# Patient Record
Sex: FEMALE | Race: BLACK OR AFRICAN AMERICAN | NOT HISPANIC OR LATINO | Employment: PART TIME | URBAN - METROPOLITAN AREA
[De-identification: names, ages, dates, MRNs, and addresses within clinical notes are randomized per-mention and may not be internally consistent; named-entity substitution may affect disease eponyms.]

---

## 2017-05-11 ENCOUNTER — OFFICE VISIT (OUTPATIENT)
Dept: FAMILY MEDICINE | Facility: CLINIC | Age: 47
End: 2017-05-11

## 2017-05-11 VITALS
SYSTOLIC BLOOD PRESSURE: 162 MMHG | TEMPERATURE: 97.8 F | WEIGHT: 146.4 LBS | HEART RATE: 68 BPM | BODY MASS INDEX: 22.98 KG/M2 | HEIGHT: 67 IN | OXYGEN SATURATION: 100 % | DIASTOLIC BLOOD PRESSURE: 82 MMHG

## 2017-05-11 DIAGNOSIS — Z30.013 ENCOUNTER FOR INITIAL PRESCRIPTION OF INJECTABLE CONTRACEPTIVE: ICD-10-CM

## 2017-05-11 DIAGNOSIS — I10 BENIGN ESSENTIAL HYPERTENSION: Primary | ICD-10-CM

## 2017-05-11 LAB — HCG UR QL: NEGATIVE

## 2017-05-11 RX ORDER — MEDROXYPROGESTERONE ACETATE 150 MG/ML
150 INJECTION, SUSPENSION INTRAMUSCULAR
Qty: 1 ML | Refills: 0 | OUTPATIENT
Start: 2017-07-27 | End: 2017-07-31

## 2017-05-11 RX ORDER — MEDROXYPROGESTERONE ACETATE 150 MG/ML
150 INJECTION, SUSPENSION INTRAMUSCULAR
Qty: 1 ML | Refills: 1 | OUTPATIENT
Start: 2017-05-11 | End: 2017-05-11

## 2017-05-11 RX ORDER — AMLODIPINE BESYLATE 10 MG/1
10 TABLET ORAL DAILY
Qty: 30 TABLET | Refills: 3 | Status: SHIPPED | OUTPATIENT
Start: 2017-05-11 | End: 2017-10-17

## 2017-05-11 RX ORDER — MEDROXYPROGESTERONE ACETATE 150 MG/ML
150 INJECTION, SUSPENSION INTRAMUSCULAR
Qty: 1 ML | Refills: 0 | OUTPATIENT
Start: 2017-05-11 | End: 2017-05-11

## 2017-05-11 NOTE — NURSING NOTE
BLOOD PRESSURE: 175/89    DATE OF LAST PAP or ANNUAL EXAM: No results found for: PAP  URINE HCG:negative    The following medication was given:     MEDICATION: Depo Provera 150mg  ROUTE: IM  SITE: RUQ - Gluts  : [employer]  LOT #: [id number]  EXPIRATION:11/2019  NEXT INJECTION DUE: 7/27/17-8/17/17  Provider: Dr. ADWOA Pruitt

## 2017-05-11 NOTE — MR AVS SNAPSHOT
"              After Visit Summary   5/11/2017    Bharath Forbes    MRN: [id number]           Patient Information     Date Of Birth          1970        Visit Information        Provider Department      5/11/2017 2:00 PM Anna De Souza MD [facility]        Today's Diagnoses     Benign essential hypertension    -  1    Encounter for initial prescription of injectable contraceptive           Follow-ups after your visit        Who to contact     Please call your clinic at [phone] to:    Ask questions about your health    Make or cancel appointments    Discuss your medicines    Learn about your test results    Speak to your doctor   If you have compliments or concerns about an experience at your clinic, or if you wish to file a complaint, please contact [facility] Patient Relations at [phone] or email us at [email or web address]         Additional Information About Your Visit        Care EveryWhere ID     This is your Care EveryWhere ID. This could be used by other organizations to access your [facility] medical records  [id number]        Your Vitals Were     Pulse Temperature Height Last Period Pulse Oximetry BMI (Body Mass Index)    68 97.8  F (36.6  C) (Oral) 5' 6.75\" (169.5 cm) 04/18/2017 (Approximate) 100% 23.1 kg/m2       Blood Pressure from Last 3 Encounters:   05/11/17 175/89   03/04/16 (!) 166/93   10/26/15 118/77    Weight from Last 3 Encounters:   05/11/17 146 lb 6.4 oz (66.4 kg)   03/04/16 166 lb 6.4 oz (75.5 kg)   10/26/15 164 lb (74.4 kg)              We Performed the Following     HCG Qualitative Urine (UPT)  ([facility])          Today's Medication Changes          These changes are accurate as of: 5/11/17  2:42 PM.  If you have any questions, ask your nurse or doctor.               Start taking these medicines.        Dose/Directions    amLODIPine 10 MG tablet   Commonly known as:  NORVASC   Used for:  Benign essential hypertension "   Started by:  Anna De Souza MD        Dose:  10 mg   Take 1 tablet (10 mg) by mouth daily   Quantity:  30 tablet   Refills:  3         Stop taking these medicines if you haven't already. Please contact your care team if you have questions.     medroxyPROGESTERone 150 MG/ML injection   Commonly known as:  DEPO-PROVERA   Stopped by:  Anna De Souza MD           metroNIDAZOLE 500 MG tablet   Commonly known as:  FLAGYL   Stopped by:  Anna De Souza MD                Where to get your medicines      These medications were sent to [facility][id number] - [place], [place] - [address]  [address], [place] [address]     Phone:  [phone]     amLODIPine 10 MG tablet                Primary Care Provider Office Phone # Fax #    Miesha Villagomez [phone]       [facility] [address] [place] [address]        Thank you!     Thank you for choosing [facility]  for your care. Our goal is always to provide you with excellent care. Hearing back from our patients is one way we can continue to improve our services. Please take a few minutes to complete the written survey that you may receive in the mail after your visit with us. Thank you!             Your Updated Medication List - Protect others around you: Learn how to safely use, store and throw away your medicines at www.disposemymeds.org.          This list is accurate as of: 5/11/17  2:42 PM.  Always use your most recent med list.                   Brand Name Dispense Instructions for use    amLODIPine 10 MG tablet    NORVASC    30 tablet    Take 1 tablet (10 mg) by mouth daily

## 2017-05-11 NOTE — PROGRESS NOTES
"Routine Clinic Visit    S: Bharath Forbes is a 47 year old female with HTN who presents for the following concerns:    Desires to restart Depo. Was doing this in 2014 or so for several years. Regular menses monthly. Patient's last menstrual period was 04/18/2017 (approximate). Not sexually active in the past 2 weeks. Not currently on any BC. Tried pills. Patch didn't work either. Not interested in LARC. No h/o blood clots or liver disease. 3 kids (28, 24, 14yr).     HTN: gets headaches sometimes. Stopped BP med a year ago (was combo amlodipine-benezepril 10mg-40mg). No leg swelling. 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    ROS: As per HPI, otherwise 8 point ROS is negative.    O: BP on recheck 162/82, initial was 175/89  Blood pressure 175/89, pulse 68, temperature 97.8  F (36.6  C), temperature source Oral, height 5' 6.75\" (169.5 cm), weight 146 lb 6.4 oz (66.4 kg), last menstrual period 04/18/2017, SpO2 100 %.   Body mass index is 23.1 kg/(m^2).    Gen: alert, pleasant, NAD  HEENT: NC/AT, EOMI, PERRL.  MMM, no erythema or lesions in orophraynx. No LAD.   CV: RRR, no murmurs. 2+ peripheral pulses  Lungs: CTAB, normal effort  Abd: soft, ND/NT  Back: no CVA tenderness  Extremities: warm, no edema  Psych: mood neutral, affect appropriate  Neuro: CN II- XII grossly intact, no focal deficits      A/P:  47 year old female with:    Encounter for initial prescription of injectable contraceptive. After discussing risks, benefits, and side effects, pt agrees with plan to re-initiate depo.   5/11/2017  Plan Documentation  Service ordered Depo Provera injection (150mg IM) may be given every 3 months for one year per brendacol.  Plan and order should be renewed one year from 5/11/2017 .  Ordered by Anna De Souza.    - medroxyPROGESTERone (DEPO-PROVERA) 150 MG/ML injection; Inject 1 mL (150 mg) into the muscle every 3 months  - HCG Qualitative Urine (UPT)  Negative today and no "     Benign essential hypertension  BP elevated x 2 today in office. Discussed exercise, low salt diet. Pt agreeable with restarting her BP meds again. We will start with just the calcium channel blocker (given her  descent) and will consider addition of ACEI in the future. Recommended BP checks at pharmacy several times/week until return visit in 2-4 weeks to recheck BP control.   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      Follow up: 2 weeks BP check  Return to care as needed if symptoms worsen or fail to improve.    Anna De Souza MD      Precepted today with: Kenyetta Pruitt MD    --------------------------------------------------------    Labs/Imaging this visit:  Recent Results (from the past 100 hour(s))   HCG Qualitative U[facility] (UPT)  ([facility])    Collection Time: 05/11/17  2:19 PM   Result Value Ref Range    HCG Qual Urine NEGATIVE Negative       Chart Review: Reviewed pertinent past hx, family hx, and social hx today.  Patient Active Problem List   Diagnosis     HTN (hypertension)     Encounter for initial prescription of injectable contraceptive       Past Medical History:   Diagnosis Date     Hypertension          Current Outpatient Prescriptions on File Prior to Visit:  [DISCONTINUED] medroxyPROGESTERone (DEPO-PROVERA) 150 MG/ML injection Inject 1 mL (150 mg) into the muscle every 3 months for 20 days     No current facility-administered medications on file prior to visit.     Allergies   Allergen Reactions     Nka [No Known Allergies]        Social History     Social History Narrative     Social History   Substance Use Topics     Smoking status: Former Smoker     Packs/day: 0.25     Years: 1.00     Types: Cigarettes     Smokeless tobacco: Former User     Quit date: 2/26/2010     Alcohol use 0.0 oz/week     0 Standard drinks  or equivalent per week      Comment: Ocassionally       Family History     Problem (# of Occurrences) Relation (Name,Age of Onset)    Hypertension (1) Mother       Negative family history of: DIABETES, Coronary Artery Disease, Breast Cancer, Cancer - colorectal, Ovarian Cancer, Prostate Cancer, Other Cancer, CEREBROVASCULAR DISEASE, Thyroid Disease, Asthma            -----------------------------------------------------------      Options for treatment and follow-up care were reviewed with the patient. Bharath Forbes engaged in the decision making process and verbalized understanding of the options discussed and agreed with the final plan.

## 2017-05-18 ASSESSMENT — PATIENT HEALTH QUESTIONNAIRE - PHQ9: SUM OF ALL RESPONSES TO PHQ QUESTIONS 1-9: 2

## 2017-05-18 NOTE — PROGRESS NOTES
Preceptor Attestation:  Patient's case reviewed and discussed with Anna De Souza MD.  Patient seen and discussed with the resident.  I agree with assessment and plan of care.  Supervising Physician:  Kenyetta Pruitt MD  [facility]

## 2017-05-31 ENCOUNTER — OFFICE VISIT (OUTPATIENT)
Dept: FAMILY MEDICINE | Facility: CLINIC | Age: 47
End: 2017-05-31

## 2017-05-31 VITALS
OXYGEN SATURATION: 100 % | TEMPERATURE: 97.7 F | RESPIRATION RATE: 16 BRPM | BODY MASS INDEX: 23.76 KG/M2 | WEIGHT: 151.4 LBS | HEIGHT: 67 IN | DIASTOLIC BLOOD PRESSURE: 84 MMHG | HEART RATE: 87 BPM | SYSTOLIC BLOOD PRESSURE: 165 MMHG

## 2017-05-31 DIAGNOSIS — I10 ESSENTIAL HYPERTENSION: Primary | ICD-10-CM

## 2017-05-31 LAB
BUN SERPL-MCNC: 11 MG/DL (ref 5–24)
CALCIUM SERPL-MCNC: 8.8 MG/DL (ref 8.5–10.4)
CHLORIDE SERPLBLD-SCNC: 101 MMOL/L (ref 94–109)
CO2 SERPL-SCNC: 28 MMOL/L (ref 20–32)
CREAT SERPL-MCNC: 0.8 MG/DL (ref 0.6–1.3)
EGFR CALCULATED (BLACK REFERENCE): >90 ML/MIN
EGFR CALCULATED (NON BLACK REFERENCE): 81.4 ML/MIN
GLUCOSE SERPL-MCNC: 88 MG/DL (ref 60–109)
POTASSIUM SERPL-SCNC: 3.7 MMOL/L (ref 3.4–5.3)
SODIUM SERPL-SCNC: 140 MMOL/L (ref 133–144)

## 2017-05-31 RX ORDER — HYDROCHLOROTHIAZIDE 25 MG/1
25 TABLET ORAL DAILY
Qty: 30 TABLET | Refills: 1 | Status: SHIPPED | OUTPATIENT
Start: 2017-05-31 | End: 2017-08-15

## 2017-05-31 NOTE — LETTER
June 2, 2017      hBarath CHAU [place]  [address] [place] [address]        Dear Bharath,    At your recent clinic visit, we checked your kidney function and electrolytes which all looked normal!     Please see below for your test results.    Resulted Orders   Basic Metabolic Panel ([facility]) - Results < 1 hr   Result Value Ref Range    Glucose 88.0 60.0 - 109.0 mg/dL    Urea Nitrogen 11.0 5.0 - 24.0 mg/dL    Creatinine 0.8 0.6 - 1.3 mg/dL    Sodium 140.0 133.0 - 144.0 mmol/L    Potassium 3.7 3.4 - 5.3 mmol/L    Chloride 101.0 94.0 - 109.0 mmol/L    Carbon Dioxide 28.0 20.0 - 32.0 mmol/L    Calcium 8.8 8.5 - 10.4 mg/dL    eGFR Calculated (Non Black Reference) 81.4 >60.0 mL/min    eGFR Calculated (Black Reference) >90 >60.0 mL/min       If you have any questions, please call the clinic to make an appointment.    Sincerely,    Anna De Souza MD

## 2017-05-31 NOTE — PROGRESS NOTES
"Routine Clinic Visit    S: Bharath Forbes is a 47 year old female with HTn who presents for the following concerns:      BP check. Still elevated today in clinic 165/84. She checked BPs at [facility] but would like cuff now. BPs on checks were still elevated at 150s/80-90s per her report. No HA/leg swelling/vision change.     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 She has even tried wearing a mask.     ROS: As per HPI, otherwise 8 point ROS is negative.    O:   Blood pressure 165/84, pulse 87, temperature 97.7  F (36.5  C), temperature source Oral, resp. rate 16, height 5' 6.5\" (168.9 cm), weight 151 lb 6.4 oz (68.7 kg), last menstrual period 04/18/2017, SpO2 100 %.   Body mass index is 24.07 kg/(m^2).    Gen: alert, pleasant, NAD  HEENT: NC/AT, EOMI, PERRL.  MMM, no erythema or lesions in orophraynx. No LAD. No thyromegaly. No carotid bruit.  CV: RRR, no murmurs. 2+ peripheral pulses  Lungs: CTAB, normal effort  Extremities: warm, no edema    A/P:  47 year old female with:    1. Essential hypertension  Uncontrolled still. BMP today was normal. Plan to continue amlodipine 10mg daily but add additional agent with thiazide. We choose this as it will likely provide more BP lowering effect than just the ACEI.   - Start hydrochlorothiazide (HYDRODIURIL) 25 MG tablet; Take 1 tablet (25 mg) by mouth daily  Dispense: 30 tablet; Refill: 1  - order for DME; Blood pressure cuff and machine for essential HTN  Dispense: 1 each; Refill: 1  - Basic Metabolic Panel ([facility]) - Results < 1 hr   - will need to recheck BMP next visit with initiation of thiazide   - Asked her to bring in BP readings and BP cuff at next visit    Follow up: 1-2 weeks with BMP recheck  Return to care as needed if symptoms worsen or fail to improve.    Anna De Souza MD      Precepted today with: Kaiden Santiago, " MD    --------------------------------------------------------    Labs/Imaging this visit:  No results found for this or any previous visit (from the past 100 hour(s)).    Chart Review: Reviewed pertinent past hx, family hx, and social hx today.  Patient Active Problem List   Diagnosis     HTN (hypertension)     Encounter for initial prescription of injectable contraceptive       Past Medical History:   Diagnosis Date     Hypertension          Current Outpatient Prescriptions on File Prior to Visit:  amLODIPine (NORVASC) 10 MG tablet Take 1 tablet (10 mg) by mouth daily   [START ON 7/27/2017] medroxyPROGESTERone (DEPO-PROVERA) 150 MG/ML injection Inject 1 mL (150 mg) into the muscle every 3 months for 21 days     No current facility-administered medications on file prior to visit.     Allergies   Allergen Reactions     Nka [No Known Allergies]        Social History     Social History Narrative     Social History   Substance Use Topics     Smoking status: Former Smoker     Packs/day: 0.25     Years: 1.00     Types: Cigarettes     Smokeless tobacco: Former User     Quit date: 2/26/2010     Alcohol use 0.0 oz/week     0 Standard drinks or equivalent per week      Comment: Ocassionally       Family History     Problem (# of Occurrences) Relation (Name,Age of Onset)    Hypertension (1) Mother       Negative family history of: DIABETES, Coronary Artery Disease, Breast Cancer, Cancer - colorectal, Ovarian Cancer, Prostate Cancer, Other Cancer, CEREBROVASCULAR DISEASE, Thyroid Disease, Asthma            -----------------------------------------------------------      Options for treatment and follow-up care were reviewed with the patient. Bharath Forbes engaged in the decision making process and verbalized understanding of the options discussed and agreed with the final plan.

## 2017-05-31 NOTE — LETTER
Work Form    5/31/2017    Re: Bharath Forbes  1970      To Whom It May Concern:     Bharath Forbes was seen in clinic today. Please assign her to smoke-free areas at work due to medical concerns with smoking exposure. Thank you.       Anna De Souza MD  5/31/2017 10:24 AM

## 2017-05-31 NOTE — MR AVS SNAPSHOT
"              After Visit Summary   5/31/2017    Bharath Forbes    MRN: [id number]           Patient Information     Date Of Birth          1970        Visit Information        Provider Department      5/31/2017 9:40 AM Anna De Souza MD [facility]        Today's Diagnoses     Essential hypertension    -  1       Follow-ups after your visit        Who to contact     Please call your clinic at [phone] to:    Ask questions about your health    Make or cancel appointments    Discuss your medicines    Learn about your test results    Speak to your doctor   If you have compliments or concerns about an experience at your clinic, or if you wish to file a complaint, please contact [facility] Patient Relations at [phone] or email us at [email or web address]         Additional Information About Your Visit        Care EveryWhere ID     This is your Care EveryWhere ID. This could be used by other organizations to access your [facility] medical records  [id number]        Your Vitals Were     Pulse Temperature Respirations Height Last Period Pulse Oximetry    87 97.7  F (36.5  C) (Oral) 16 5' 6.5\" (168.9 cm) 04/18/2017 (Approximate) 100%    BMI (Body Mass Index)                   24.07 kg/m2            Blood Pressure from Last 3 Encounters:   05/31/17 165/84   05/11/17 162/82   03/04/16 (!) 166/93    Weight from Last 3 Encounters:   05/31/17 151 lb 6.4 oz (68.7 kg)   05/11/17 146 lb 6.4 oz (66.4 kg)   03/04/16 166 lb 6.4 oz (75.5 kg)              We Performed the Following     Basic Metabolic Panel ([facility]) - Results < 1 hr          Today's Medication Changes          These changes are accurate as of: 5/31/17 10:29 AM.  If you have any questions, ask your nurse or doctor.               Start taking these medicines.        Dose/Directions    hydrochlorothiazide 25 MG tablet   Commonly known as:  HYDRODIURIL   Used for:  Essential hypertension   Started " by:  Anna De Souza MD        Dose:  25 mg   Take 1 tablet (25 mg) by mouth daily   Quantity:  30 tablet   Refills:  1       order for DME   Used for:  Essential hypertension   Started by:  Anna De Souza MD        Blood pressure cuff and machine for essential HTN   Quantity:  1 each   Refills:  1            Where to get your medicines      These medications were sent to [id number] - [place], [place] - [address]  [address], [place] [address]     Phone:  [phone]     hydrochlorothiazide 25 MG tablet         Some of these will need a paper prescription and others can be bought over the counter.  Ask your nurse if you have questions.     Bring a paper prescription for each of these medications     order for DME                Primary Care Provider Office Phone # Fax #    Anna De Souza [phone]       [facility] [address] [place] [address]        Thank you!     Thank you for choosing [facility]  for your care. Our goal is always to provide you with excellent care. Hearing back from our patients is one way we can continue to improve our services. Please take a few minutes to complete the written survey that you may receive in the mail after your visit with us. Thank you!             Your Updated Medication List - Protect others around you: Learn how to safely use, store and throw away your medicines at www.disposemymeds.org.          This list is accurate as of: 5/31/17 10:29 AM.  Always use your most recent med list.                   Brand Name Dispense Instructions for use    amLODIPine 10 MG tablet    NORVASC    30 tablet    Take 1 tablet (10 mg) by mouth daily       hydrochlorothiazide 25 MG tablet    HYDRODIURIL    30 tablet    Take 1 tablet (25 mg) by mouth daily       medroxyPROGESTERone 150 MG/ML injection   Start taking on:  7/27/2017    DEPO-PROVERA    1 mL    Inject 1 mL (150 mg) into the  muscle every 3 months for 21 days       order for DME     1 each    Blood pressure cuff and machine for essential HTN

## 2017-06-02 NOTE — PROGRESS NOTES
Please sent result letter. (and include scanned imaging/report if it pertains).    Dear Bharath    At your recent clinic visit, we checked your kidney function and electrolytes which all looked normal!    Please call our clinic with any questions. We look forward to seeing you again.        Sincerely,   Anna De Souza MD  Family Medicine Resident, PGY-2    [facility] [address] [place] [address] [phone]

## 2017-06-14 NOTE — PROGRESS NOTES
Preceptor Attestation:  Patient's case reviewed and discussed with Anna De Souza MD resident and I evaluated the patient. I agree with written assessment and plan of care.  Supervising Physician:  Kaiden Santiago MD MD  [facility]

## 2017-07-31 ENCOUNTER — OFFICE VISIT (OUTPATIENT)
Dept: FAMILY MEDICINE | Facility: CLINIC | Age: 47
End: 2017-07-31

## 2017-07-31 VITALS
DIASTOLIC BLOOD PRESSURE: 81 MMHG | SYSTOLIC BLOOD PRESSURE: 130 MMHG | TEMPERATURE: 99.2 F | WEIGHT: 149 LBS | HEIGHT: 67 IN | BODY MASS INDEX: 23.39 KG/M2 | OXYGEN SATURATION: 100 % | HEART RATE: 79 BPM

## 2017-07-31 DIAGNOSIS — B96.89 BACTERIAL VAGINOSIS: ICD-10-CM

## 2017-07-31 DIAGNOSIS — I10 ESSENTIAL HYPERTENSION: ICD-10-CM

## 2017-07-31 DIAGNOSIS — N76.0 BACTERIAL VAGINOSIS: ICD-10-CM

## 2017-07-31 DIAGNOSIS — Z30.42 ENCOUNTER FOR SURVEILLANCE OF INJECTABLE CONTRACEPTIVE: Primary | ICD-10-CM

## 2017-07-31 DIAGNOSIS — N89.8 VAGINAL DISCHARGE: ICD-10-CM

## 2017-07-31 DIAGNOSIS — Z30.013 ENCOUNTER FOR INITIAL PRESCRIPTION OF INJECTABLE CONTRACEPTIVE: ICD-10-CM

## 2017-07-31 LAB
ANION GAP SERPL CALCULATED.3IONS-SCNC: 9 MMOL/L (ref 5–18)
BACTERIA: NORMAL
BUN SERPL-MCNC: 15 MG/DL (ref 8–22)
CALCIUM SERPL-MCNC: 9.8 MG/DL (ref 8.5–10.5)
CHLORIDE SERPL-SCNC: 102 MMOL/L (ref 98–107)
CLUE CELLS: NORMAL
CO2 SERPL-SCNC: 29 MMOL/L (ref 22–31)
CREAT SERPL-MCNC: 0.82 MG/DL (ref 0.6–1.1)
GLUCOSE SERPL-MCNC: 84 MG/DL (ref 70–125)
HCG UR QL: NEGATIVE
MOTILE TRICHOMONAS: NEGATIVE
ODOR: POSITIVE
PH WET PREP: NORMAL
POTASSIUM SERPL-SCNC: 4.7 MMOL/L (ref 3.5–5)
SODIUM SERPL-SCNC: 140 MMOL/L (ref 136–145)
WBC WET PREP: NORMAL
YEAST: NEGATIVE

## 2017-07-31 RX ORDER — METRONIDAZOLE 500 MG/1
500 TABLET ORAL 2 TIMES DAILY
Qty: 14 TABLET | Refills: 0 | Status: SHIPPED | OUTPATIENT
Start: 2017-07-31 | End: 2018-07-25

## 2017-07-31 NOTE — PROGRESS NOTES
Preceptor Attestation:  Patient's case reviewed and discussed with Anna De Souza MD resident and I evaluated the patient. I agree with written assessment and plan of care.  Supervising Physician:Sanjay Wisdom MD    [facility]

## 2017-07-31 NOTE — Clinical Note
staff: can you please call pt to schedule f/u visit in 3 months to come in for BP check and next Depo shot (I'm not sure if I gave her an orange slip at end of visit). Please remind her to bring a recording of her blood pressure readings to the next visit. All her labs were normal if she asks (including gonorrhea/chlamydia, and kidney function). I will be sending normal result letter in the mail. Thanks.

## 2017-07-31 NOTE — MR AVS SNAPSHOT
"              After Visit Summary   7/31/2017    Bharath Forbes    MRN: [id number]           Patient Information     Date Of Birth          1970        Visit Information        Provider Department      7/31/2017 11:40 AM Anna De Souza MD [facility]        Today's Diagnoses     Encounter for surveillance of injectable contraceptive    -  1    Essential hypertension        Vaginal discharge        Bacterial vaginosis           Follow-ups after your visit        Who to contact     Please call your clinic at [phone] to:    Ask questions about your health    Make or cancel appointments    Discuss your medicines    Learn about your test results    Speak to your doctor   If you have compliments or concerns about an experience at your clinic, or if you wish to file a complaint, please contact [facility] Patient Relations at [phone] or email us at [email or web address]         Additional Information About Your Visit        Care EveryWhere ID     This is your Care EveryWhere ID. This could be used by other organizations to access your [facility] medical records  [id number]        Your Vitals Were     Pulse Temperature Height Pulse Oximetry BMI (Body Mass Index)       79 99.2  F (37.3  C) (Oral) 5' 6.73\" (169.5 cm) 100% 23.52 kg/m2        Blood Pressure from Last 3 Encounters:   07/31/17 130/81   05/31/17 165/84   05/11/17 162/82    Weight from Last 3 Encounters:   07/31/17 149 lb (67.6 kg)   05/31/17 151 lb 6.4 oz (68.7 kg)   05/11/17 146 lb 6.4 oz (66.4 kg)              We Performed the Following     Basic Metabolic Profile ([facility])     Chlamydia/Gono Amplified ([facility])     HCG Qualitative Urine (UPT)  ([facility])     medroxyPROGESTERone (DEPO-PROVERA) injection 150 mg ([facility])     Wet Prep ([facility])          Today's Medication Changes          These changes are accurate as of: 7/31/17 11:59 PM.  If you have any questions, ask your nurse or " doctor.               Start taking these medicines.        Dose/Directions    metroNIDAZOLE 500 MG tablet   Commonly known as:  FLAGYL   Used for:  Bacterial vaginosis   Started by:  Anna De Souza MD        Dose:  500 mg   Take 1 tablet (500 mg) by mouth 2 times daily   Quantity:  14 tablet   Refills:  0            Where to get your medicines      These medications were sent to [id number] - [place], [place] - [address]  [address], [place] [address]     Phone:  [phone]     metroNIDAZOLE 500 MG tablet         Some of these will need a paper prescription and others can be bought over the counter.  Ask your nurse if you have questions.     You don't need a prescription for these medications     medroxyPROGESTERone 150 MG/ML injection                Primary Care Provider Office Phone # Fax #    Anna De Souza [phone]       [facility] [address] [place] [address]        Equal Access to Services     HERVE BARBA AH: Hadii aad ku hadasho Soomaali, waaxda luqadaha, qaybta kaalmada adeegyada, waxay idiin hayaan shilo kharazaynab broderick . So [facility] [phone].    ATENCIÓN: Si eliu cabello, tiene a michelle disposición servicios gratuitos de asistencia lingüística. Llame al [phone].    We comply with applicable federal civil rights laws and [place] laws. We do not discriminate on the basis of race, color, national origin, age, disability sex, sexual orientation or gender identity.            Thank you!     Thank you for choosing [facility]  for your care. Our goal is always to provide you with excellent care. Hearing back from our patients is one way we can continue to improve our services. Please take a few minutes to complete the written survey that you may receive in the mail after your visit with us. Thank you!             Your Updated Medication List - Protect others around you: Learn how to safely use, store and throw  away your medicines at www.disposemymeds.org.          This list is accurate as of: 7/31/17 11:59 PM.  Always use your most recent med list.                   Brand Name Dispense Instructions for use Diagnosis    amLODIPine 10 MG tablet    NORVASC    30 tablet    Take 1 tablet (10 mg) by mouth daily    Benign essential hypertension       hydrochlorothiazide 25 MG tablet    HYDRODIURIL    30 tablet    Take 1 tablet (25 mg) by mouth daily    Essential hypertension       medroxyPROGESTERone 150 MG/ML injection   Start taking on:  10/16/2017    DEPO-PROVERA    1 mL    Inject 1 mL (150 mg) into the muscle every 3 months for 21 days    Encounter for initial prescription of injectable contraceptive       metroNIDAZOLE 500 MG tablet    FLAGYL    14 tablet    Take 1 tablet (500 mg) by mouth 2 times daily    Bacterial vaginosis       order for DME     1 each    Blood pressure cuff and machine for essential HTN    Essential hypertension

## 2017-07-31 NOTE — NURSING NOTE
MEDICATION: Medroxyprogesterone  ROUTE: IM  SITE: LUQ  DOSE: 150mg/ml  : [place]  LOT: [id number]  EXPIRATION DATE: 12/2019  ND: [address]-1  RTC FOR NEXT DOSE: 10/16/17- 11/6/2017  Reminder card given to patient.   Dr. TUCKER was available onsite at the time of this service.  ---Haja Johnson,CMA

## 2017-07-31 NOTE — LETTER
August 3, 2017      Bharath Forbes  [address] [place] [address]        Dear Bharath,    At your recent clinic visit, we checked your kidney function which was normal. You can continue the current dose of your blood pressure medicines for now (amlodipine 10mg and hydrochlorothiazide 25mg daily). I would like to reassess your blood pressures in 3 months when you come back in for a blood pressure check up and the next Depo shot.     Also, your gonorrhea and chlamydia testing was negative (normal!). We are treating you for bacterial vaginosis based on the other test we did in clinic that day. I hope your symptoms are improving and please call to check up sooner if not.     Please call our clinic with any questions. We look forward to seeing you again.     Please see below for your test results.    Resulted Orders   HCG Qualitative Urine (UPT)  ([facility])   Result Value Ref Range    HCG Qual Urine NEGATIVE Negative   Basic Metabolic Profile ([facility])   Result Value Ref Range    Sodium 140 136 - 145 mmol/L    Potassium 4.7 3.5 - 5.0 mmol/L    Chloride 102 98 - 107 mmol/L    CO2, Total 29 22 - 31 mmol/L    Anion Gap 9 5 - 18 mmol/L    Glucose 84 70 - 125 mg/dL    Calcium 9.8 8.5 - 10.5 mg/dL    Urea Nitrogen 15 8 - 22 mg/dL    Creatinine 0.82 0.60 - 1.10 mg/dL    GFR Estimate If Black >60 >60 mL/min/1.73m2    GFR Estimate >60 >60 mL/min/1.73m2    Narrative    Test performed by:  [facility] [address] [place] [address]  Fasting Glucose reference range is 70-99 mg/dL per  American Diabetes Association (ADA) guidelines.   Wet Prep ([facility])   Result Value Ref Range    Yeast Wet Prep NEGATIVE none    Motile Trichomonas Wet Prep NEGATIVE Negative    Clue Cells Wet Prep Present >20% NONE    WBC WET PREP 2-5 2 - 5    Bacteria Wet Prep Many None    pH Wet Prep Not performed 3.8 - 4.5    Odor Wet Prep Positive NONE   Chlamydia/Gono Amplified ([facility])   Result Value Ref Range    Chlamydia  trac,Amplified Prb Negative Negative    N gonorrhoeae,Amplified Prb Negative Negative    Narrative    Test performed by:  [facility] [address] [place] [address]       If you have any questions, please call the clinic to make an appointment.    Sincerely,    Anna De Souza MD

## 2017-07-31 NOTE — PROGRESS NOTES
"[facility]        Subjective     HPI:  Bharath Forbes is a 47 year old female with HTN who presents for the following concerns:    Vaginal discharge:  For the past month she is having spotting, light brown to light pink. She also feels \"more wet\" like there is more discharge than just the spotting she sees on her pad.   Needs one panti-liner per day. Feels like the discharge has a \"foul smell\". No itching/burning. No thick white discharge that she is aware of.    Due for Depo. UPT negative today. LMP sometime in April 2017. Of note, after re initiation of depo in May she did not have any vaginal bleeding or spotting for the following 2 months, and only in the past 1 month has she noticed this spotting start.     F/u BP: Checks BP TID on days that she works (3x/week). Usually 48-60s diastolic, 130s systolic. Taking both BP meds daily. No LH/Dizzines. No leg swelling.       ROS: + hot sweats, mood swings. Mother went through menopause at age 60 however. Constitutional, cardiovascular, respiratory, GI, , MSK systems reviewed and negative except as noted above.    Social:  Nonsmoker; appreciates that the letter I wrote last time allowed her employer to move her to a smoke free area which has made her job more tolerable          Objective   /81  Pulse 79  Temp 99.2  F (37.3  C) (Oral)  Ht 5' 6.73\" (169.5 cm)  Wt 149 lb (67.6 kg)  SpO2 100%  BMI 23.52 kg/m2 Body mass index is 23.52 kg/(m^2).    Wt Readings from Last 3 Encounters:   07/31/17 149 lb (67.6 kg)   05/31/17 151 lb 6.4 oz (68.7 kg)   05/11/17 146 lb 6.4 oz (66.4 kg)       Gen: healthy, alert and no distress  HEENT: No asymmetry, MMM  CV: RRR, no murmurs. 2+ peripheral pulses  Lungs: CTAB, no wheezing or crackles, normal effort  Abd: soft, ND/NT  Back: no CVA tenderness  Extremities: warm, no edema  : vaginal mucosa pink and moist, no signs of irritation; copious amounts of grey- white discharge; + odor; cervical exam is " normal and no CMT, no adnexal tenderness or uterine masses appreciated on bimanual exam      Labs/Imaging this visit:  Recent Results (from the past 100 hour(s))   HCG Qualitative Urine (UPT)  ([facility])    Collection Time: 07/31/17 12:06 PM   Result Value Ref Range    HCG Qual Urine NEGATIVE Negative   Basic Metabolic Profile ([facility])    Collection Time: 07/31/17 12:23 PM   Result Value Ref Range    Sodium 140 136 - 145 mmol/L    Potassium 4.7 3.5 - 5.0 mmol/L    Chloride 102 98 - 107 mmol/L    CO2, Total 29 22 - 31 mmol/L    Anion Gap 9 5 - 18 mmol/L    Glucose 84 70 - 125 mg/dL    Calcium 9.8 8.5 - 10.5 mg/dL    Urea Nitrogen 15 8 - 22 mg/dL    Creatinine 0.82 0.60 - 1.10 mg/dL    GFR Estimate If Black >60 >60 mL/min/1.73m2    GFR Estimate >60 >60 mL/min/1.73m2   Wet Prep ([facility])    Collection Time: 07/31/17 12:28 PM   Result Value Ref Range    Yeast Wet Prep NEGATIVE none    Motile Trichomonas Wet Prep NEGATIVE Negative    Clue Cells Wet Prep Present >20% NONE    WBC WET PREP 2-5 2 - 5    Bacteria Wet Prep Many None    pH Wet Prep Not performed 3.8 - 4.5    Odor Wet Prep Positive NONE   Chlamydia/Gono Amplified ([facility])    Collection Time: 07/31/17 12:42 PM   Result Value Ref Range    Chlamydia trac,Amplified Prb Negative Negative    N gonorrhoeae,Amplified Prb Negative Negative            Assessment & Plan     47 year old female with:    Contraceptive management, recently with vaginal spotting x 1 month  UPT negative today. Due for depo and this was given today. I suspect she has some spotting as she is due for next dose. Additionally she is possibly in perimenopausal timeframe of her life. We discussed options and she agrees to continue with Depo. If her spotting resolves after this dose, reasonable to go another 3-6 months to see if each round the bleeding/spotting decreases substantially to a point where she will no longer experience spotting as it comes time for her next depo injections. However, we  discussed that if she continues to have vaginal spotting, would want to pursue pelvic u/s to assess endometrial stripe for hyperplasia or malignancy, and assess uterus for structural abnormalities such as fibroids. She agrees with this plan.     Essential hypertension  Due for recheck BMP (was supposed to come back to clinic 1-2 weeks after initiating HCTZ on 5/31). Checked today and was normal with Cr 0.82.   - Continues on amlodipine 10mg daily and HCTZ 25mg daily  - Could consider increase if her BPs are persistently in upper 130s systolic when she returns with BP recordings at next visit; otherwise BP sounds to be generally 130/50s    Vaginal discharge s/t BV  - Wet Prep ([facility] ) notable for clue cells, + odor and exam with grey-white discharge  - Will treat bacterial vaginosis with metronidazole 500mg BID for 1 week  - Chlamydia/Gono Amplified ([facility]) returned negative, will send result letter      Follow up: 3 months for recheck BP and reassess vaginal bleeding  Return to care as needed if symptoms worsen or fail to improve.    Anna De Souza MD    Precepted today with: Sanjay Wisdom MD  -------  PMH:  Patient Active Problem List   Diagnosis     HTN (hypertension)     Encounter for initial prescription of injectable contraceptive      Current Outpatient Prescriptions   Medication     metroNIDAZOLE (FLAGYL) 500 MG tablet     hydrochlorothiazide (HYDRODIURIL) 25 MG tablet     amLODIPine (NORVASC) 10 MG tablet     [START ON 10/16/2017] medroxyPROGESTERone (DEPO-PROVERA) 150 MG/ML injection     order for DME     No current facility-administered medications for this visit.       ALLERGIES: Nka [no known allergies]    Options for treatment and follow-up care were reviewed with the patient. Bharath Forbes engaged in the decision making process and verbalized understanding of the options discussed and agreed with the final plan.

## 2017-08-01 LAB
C TRACH RRNA CVX QL NAA+PROBE: NEGATIVE
N GONORRHOEA RRNA SPEC QL NAA+PROBE: NEGATIVE

## 2017-08-01 RX ORDER — MEDROXYPROGESTERONE ACETATE 150 MG/ML
150 INJECTION, SUSPENSION INTRAMUSCULAR
Qty: 1 ML | Refills: 0 | OUTPATIENT
Start: 2017-10-16 | End: 2017-10-17

## 2017-08-03 NOTE — PROGRESS NOTES
Please send result letter. (and include scanned imaging/report if it pertains).    Dear Bharath    At your recent clinic visit, we checked your kidney function which was normal. You can continue the current dose of your blood pressure medicines for now (amlodipine 10mg and hydrochlorothiazide 25mg daily). I would like to reassess your blood pressures in 3 months when you come back in for a blood pressure check up and the next Depo shot.     Also, your gonorrhea and chlamydia testing was negative (normal!). We are treating you for bacterial vaginosis based on the other test we did in clinic that day. I hope your symptoms are improving and please call to check up sooner if not.     Please call our clinic with any questions. We look forward to seeing you again.      Sincerely,   Anna De Souza MD  Family Medicine Resident, PGY-3    [facility] [address] [place], [place] [id number] [phone]

## 2017-08-15 DIAGNOSIS — I10 ESSENTIAL HYPERTENSION: ICD-10-CM

## 2017-08-21 RX ORDER — HYDROCHLOROTHIAZIDE 25 MG/1
25 TABLET ORAL DAILY
Qty: 30 TABLET | Refills: 3 | Status: SHIPPED | OUTPATIENT
Start: 2017-08-21 | End: 2017-10-17

## 2017-10-17 ENCOUNTER — OFFICE VISIT (OUTPATIENT)
Dept: FAMILY MEDICINE | Facility: CLINIC | Age: 47
End: 2017-10-17

## 2017-10-17 VITALS
TEMPERATURE: 98.2 F | DIASTOLIC BLOOD PRESSURE: 84 MMHG | BODY MASS INDEX: 23.76 KG/M2 | RESPIRATION RATE: 16 BRPM | HEIGHT: 67 IN | SYSTOLIC BLOOD PRESSURE: 147 MMHG | WEIGHT: 151.4 LBS | OXYGEN SATURATION: 99 % | HEART RATE: 76 BPM

## 2017-10-17 DIAGNOSIS — F41.1 GAD (GENERALIZED ANXIETY DISORDER): ICD-10-CM

## 2017-10-17 DIAGNOSIS — I10 BENIGN ESSENTIAL HYPERTENSION: Primary | ICD-10-CM

## 2017-10-17 DIAGNOSIS — Z30.013 ENCOUNTER FOR INITIAL PRESCRIPTION OF INJECTABLE CONTRACEPTIVE: ICD-10-CM

## 2017-10-17 DIAGNOSIS — G47.00 INSOMNIA, UNSPECIFIED TYPE: ICD-10-CM

## 2017-10-17 DIAGNOSIS — N95.1 MENOPAUSAL SYNDROME (HOT FLASHES): ICD-10-CM

## 2017-10-17 DIAGNOSIS — Z30.42 ENCOUNTER FOR SURVEILLANCE OF INJECTABLE CONTRACEPTIVE: ICD-10-CM

## 2017-10-17 RX ORDER — MEDROXYPROGESTERONE ACETATE 150 MG/ML
150 INJECTION, SUSPENSION INTRAMUSCULAR
Qty: 1 ML | Refills: 0 | OUTPATIENT
Start: 2018-01-03 | End: 2018-07-30

## 2017-10-17 RX ORDER — SERTRALINE HYDROCHLORIDE 25 MG/1
25 TABLET, FILM COATED ORAL DAILY
Qty: 30 TABLET | Refills: 1 | Status: SHIPPED | OUTPATIENT
Start: 2017-10-17 | End: 2017-10-18

## 2017-10-17 RX ORDER — AMLODIPINE BESYLATE 10 MG/1
10 TABLET ORAL DAILY
Qty: 90 TABLET | Refills: 3 | Status: SHIPPED | OUTPATIENT
Start: 2017-10-17 | End: 2018-07-20

## 2017-10-17 RX ORDER — HYDROCHLOROTHIAZIDE 25 MG/1
25 TABLET ORAL DAILY
Qty: 90 TABLET | Refills: 3 | Status: SHIPPED | OUTPATIENT
Start: 2017-10-17 | End: 2018-07-20

## 2017-10-17 ASSESSMENT — ANXIETY QUESTIONNAIRES
6. BECOMING EASILY ANNOYED OR IRRITABLE: SEVERAL DAYS
3. WORRYING TOO MUCH ABOUT DIFFERENT THINGS: MORE THAN HALF THE DAYS
7. FEELING AFRAID AS IF SOMETHING AWFUL MIGHT HAPPEN: NOT AT ALL
5. BEING SO RESTLESS THAT IT IS HARD TO SIT STILL: MORE THAN HALF THE DAYS
2. NOT BEING ABLE TO STOP OR CONTROL WORRYING: MORE THAN HALF THE DAYS
GAD7 TOTAL SCORE: 9
1. FEELING NERVOUS, ANXIOUS, OR ON EDGE: NOT AT ALL

## 2017-10-17 ASSESSMENT — PATIENT HEALTH QUESTIONNAIRE - PHQ9
5. POOR APPETITE OR OVEREATING: MORE THAN HALF THE DAYS
SUM OF ALL RESPONSES TO PHQ QUESTIONS 1-9: 10

## 2017-10-17 NOTE — PROGRESS NOTES
"Preceptor Attestation:  Patient's case reviewed and discussed with Dr Montoya. Patient seen and discussed with the resident.\"}.  I agree with written assessment and plan of care.  Supervising Physician:  Dick Cronin MD  [facility]    "

## 2017-10-17 NOTE — NURSING NOTE
Due for:   flu shot - pt declined. Does not do this at all per pt.    I administered the following to Bharath Forbes.    MEDICATION: Medroxyprogesterone 150 mg  ROUTE: IM  SITE: RUQ - Gluteus  DOSE: 150 mg/ 1 ml  LOT #: [id number]  :  [employer]   EXPIRATION DATE:  02/2020  NDC#: [address]-4     NOTE: Depo given today. RTC 1/3/18 thru 1/23/18. Reminder card given to pt today.    Was entire vial of medication used? Yes    Name of provider who requested the injection: Dr. Anna De Souza  Name of provider on site (faculty or community preceptor) at the time of performing the injection: Dr. Dick Cronin.    PARTHA Simpson

## 2017-10-17 NOTE — MR AVS SNAPSHOT
After Visit Summary   10/17/2017    Bharath Forbes    MRN: [id number]           Patient Information     Date Of Birth          1970        Visit Information        Provider Department      10/17/2017 2:40 PM Anna De Souza MD [facility]        Today's Diagnoses     Encounter for surveillance of injectable contraceptive    -  1    Benign essential hypertension        Essential hypertension        Encounter for initial prescription of injectable contraceptive        Menopausal syndrome (hot flashes)        Insomnia, unspecified type        JAMAICA (generalized anxiety disorder)          Care Instructions    Mood Self Care Plan / Survival Kit    Self-Care for Mood Changes and Stress  Here s the deal. Your body and mind are really not as separate as most people think.  What you do and think affects how you feel and how you feel influences what you do and think. This means if you do things that people who feel good do, it will help you feel better.  Sometimes this is all it takes.  There is also a place for medication and therapy depending on how severe your depression is, so be sure to consult with your medical provider and/ or Behavioral Health Consultant if your symptoms are worsening or not improving.     In order to better manage my stress, I will:    Exercise  Get some form of exercise, every day. This will help reduce pain and release endorphins, the  feel good  chemicals in your brain. This is almost as good as taking antidepressants!  This is not the same as joining a gym and then never going! (they count on that by the way ) It can be as simple as just going for a walk or doing some gardening, anything that will get you moving.      Hygiene   Maintain good hygiene (Get out of bed in the morning, Make your bed, Brush your teeth, Take a shower, and Get dressed like you were going to work, even if you are unemployed).  If your clothes don't fit try to get ones that  do.    Diet  I will strive to eat foods that are good for me, drink plenty of water, and avoid excessive sugar, caffeine, alcohol, and other mood-altering substances.  Some foods that are helpful in depression are: complex carbohydrates, B vitamins, flaxseed, fish or fish oil, fresh fruits and vegetables.    Psychotherapy  I agree to participate in Individual Therapy (if recommended).    Medication  If prescribed medications, I agree to take them.  Missing doses can result in serious side effects.  I understand that drinking alcohol, or other illicit drug use, may cause potential side effects.  I will not stop my medication abruptly without first discussing it with my provider.    Staying Connected With Others  I will stay in touch with my friends, family members, and my primary care provider/team.    Use your imagination  Be creative.  We all have a creative side; it doesn t matter if it s oil painting, sand castles, or mud pies! This will also kick up the endorphins.    Witness Beauty  (AKA stop and smell the roses) Take a look outside, even in mid-winter. Notice colors, textures. Watch the squirrels and birds.     Service to others  Be of service to others.  There is always someone else in need.  By helping others we can  get out of ourselves  and remember the really important things.  This also provides opportunities for practicing all the other parts of the program.    Humor  Laugh and be silly!  Adjust your TV habits for less news and crime-drama and more comedy.    Control your stress  Try breathing deep, massage therapy, biofeedback, and meditation. Find time to relax each day.     My support system    Clinic Contact:  Phone number:    Contact 1:  Phone number:    Contact 2:  Phone number:    Mandaen/:  Phone number:    Therapist:  Phone number:    Local crisis center:    Phone number:    Other community support:  Phone number:                Follow-ups after your visit        Follow-up  "notes from your care team     Return in about 4 weeks (around 11/14/2017).      Who to contact     Please call your clinic at [phone] to:    Ask questions about your health    Make or cancel appointments    Discuss your medicines    Learn about your test results    Speak to your doctor   If you have compliments or concerns about an experience at your clinic, or if you wish to file a complaint, please contact [facility] Patient Relations at [phone] or email us at [email or web address]         Additional Information About Your Visit        Care EveryWhere ID     This is your Care EveryWhere ID. This could be used by other organizations to access your [place] medical records  [id number]        Your Vitals Were     Pulse Temperature Respirations Height Pulse Oximetry BMI (Body Mass Index)    80 98.2  F (36.8  C) (Oral) 16 5' 7.25\" (170.8 cm) 99% 23.54 kg/m2       Blood Pressure from Last 3 Encounters:   10/17/17 147/90   07/31/17 130/81   05/31/17 165/84    Weight from Last 3 Encounters:   10/17/17 151 lb 6.4 oz (68.7 kg)   07/31/17 149 lb (67.6 kg)   05/31/17 151 lb 6.4 oz (68.7 kg)              We Performed the Following     medroxyPROGESTERone (DEPO-PROVERA) injection 150 mg (Charge)          Today's Medication Changes          These changes are accurate as of: 10/17/17  3:46 PM.  If you have any questions, ask your nurse or doctor.               Start taking these medicines.        Dose/Directions    sertraline 25 MG tablet   Commonly known as:  ZOLOFT   Used for:  Menopausal syndrome (hot flashes), Insomnia, unspecified type, JAMAICA (generalized anxiety disorder)   Started by:  Anna De Souza MD        Dose:  25 mg   Take 1 tablet (25 mg) by mouth daily for 2 weeks, then increase to 2 tablets (50mg)- This med should be taken before bedtime.   Quantity:  30 tablet   Refills:  1            Where to get your medicines      These medications were sent to [facility] " [id number] - [place], [place] - [address]  [address], [place] [address]     Phone:  [phone]     amLODIPine 10 MG tablet    hydrochlorothiazide 25 MG tablet    sertraline 25 MG tablet         Some of these will need a paper prescription and others can be bought over the counter.  Ask your nurse if you have questions.     You don't need a prescription for these medications     medroxyPROGESTERone 150 MG/ML injection                Primary Care Provider Office Phone # Fax #    Anna De Souza [phone]       [facility] [address] [facility] [address]        Equal Access to Services     HERVE BARBA : Hadii erika chino Soalfie, waaxda luqadaha, qaybta kaalmada adepopyada, juan manuel barnes. So [facility] [phone].    ATENCIÓN: Si habla español, tiene a michelle disposición servicios gratuitos de asistencia lingüística. Llame al [phone].    We comply with applicable federal civil rights laws and [place] laws. We do not discriminate on the basis of race, color, national origin, age, disability, sex, sexual orientation, or gender identity.            Thank you!     Thank you for choosing [facility]  for your care. Our goal is always to provide you with excellent care. Hearing back from our patients is one way we can continue to improve our services. Please take a few minutes to complete the written survey that you may receive in the mail after your visit with us. Thank you!             Your Updated Medication List - Protect others around you: Learn how to safely use, store and throw away your medicines at www.disposemymeds.org.          This list is accurate as of: 10/17/17  3:46 PM.  Always use your most recent med list.                   Brand Name Dispense Instructions for use Diagnosis    amLODIPine 10 MG tablet    NORVASC    90 tablet    Take 1 tablet (10 mg) by mouth daily    Benign essential hypertension        hydrochlorothiazide 25 MG tablet    HYDRODIURIL    90 tablet    Take 1 tablet (25 mg) by mouth daily    Essential hypertension       medroxyPROGESTERone 150 MG/ML injection   Start taking on:  1/3/2018    DEPO-PROVERA    1 mL    Inject 1 mL (150 mg) into the muscle every 3 months for 20 days    Encounter for initial prescription of injectable contraceptive       metroNIDAZOLE 500 MG tablet    FLAGYL    14 tablet    Take 1 tablet (500 mg) by mouth 2 times daily    Bacterial vaginosis       order for DME     1 each    Blood pressure cuff and machine for essential HTN    Essential hypertension       sertraline 25 MG tablet    ZOLOFT    30 tablet    Take 1 tablet (25 mg) by mouth daily for 2 weeks, then increase to 2 tablets (50mg)- This med should be taken before bedtime.    Menopausal syndrome (hot flashes), Insomnia, unspecified type, JAMAICA (generalized anxiety disorder)

## 2017-10-17 NOTE — PATIENT INSTRUCTIONS
Mood Self Care Plan / Survival Kit    Self-Care for Mood Changes and Stress  Here s the deal. Your body and mind are really not as separate as most people think.  What you do and think affects how you feel and how you feel influences what you do and think. This means if you do things that people who feel good do, it will help you feel better.  Sometimes this is all it takes.  There is also a place for medication and therapy depending on how severe your depression is, so be sure to consult with your medical provider and/ or Behavioral Health Consultant if your symptoms are worsening or not improving.     In order to better manage my stress, I will:    Exercise  Get some form of exercise, every day. This will help reduce pain and release endorphins, the  feel good  chemicals in your brain. This is almost as good as taking antidepressants!  This is not the same as joining a gym and then never going! (they count on that by the way ) It can be as simple as just going for a walk or doing some gardening, anything that will get you moving.      Hygiene   Maintain good hygiene (Get out of bed in the morning, Make your bed, Brush your teeth, Take a shower, and Get dressed like you were going to work, even if you are unemployed).  If your clothes don't fit try to get ones that do.    Diet  I will strive to eat foods that are good for me, drink plenty of water, and avoid excessive sugar, caffeine, alcohol, and other mood-altering substances.  Some foods that are helpful in depression are: complex carbohydrates, B vitamins, flaxseed, fish or fish oil, fresh fruits and vegetables.    Psychotherapy  I agree to participate in Individual Therapy (if recommended).    Medication  If prescribed medications, I agree to take them.  Missing doses can result in serious side effects.  I understand that drinking alcohol, or other illicit drug use, may cause potential side effects.  I will not stop my medication abruptly without first  discussing it with my provider.    Staying Connected With Others  I will stay in touch with my friends, family members, and my primary care provider/team.    Use your imagination  Be creative.  We all have a creative side; it doesn t matter if it s oil painting, sand castles, or mud pies! This will also kick up the endorphins.    Witness Beauty  (AKA stop and smell the roses) Take a look outside, even in mid-winter. Notice colors, textures. Watch the squirrels and birds.     Service to others  Be of service to others.  There is always someone else in need.  By helping others we can  get out of ourselves  and remember the really important things.  This also provides opportunities for practicing all the other parts of the program.    Humor  Laugh and be silly!  Adjust your TV habits for less news and crime-drama and more comedy.    Control your stress  Try breathing deep, massage therapy, biofeedback, and meditation. Find time to relax each day.     My support system    Clinic Contact:  Phone number:    Contact 1:  Phone number:    Contact 2:  Phone number:    Latter-day/:  Phone number:    Therapist:  Phone number:    Local crisis center:    Phone number:    Other community support:  Phone number:

## 2017-10-17 NOTE — PROGRESS NOTES
"[facility]        Subjective     HPI:  Bharath Forbes is a 47 year old female with HTN who presents for the following concerns:    BP med f/u: On HCTZ 25mg daily and amlodipine 10mg daily. Hasn't been checking lately at home. In the past BPs had been 130s systolic or less. Has been very stressed today.     Can't sleep at night \"mainly from hot flashes\"- wondering if she might be going through menopause? LMP- 3 months before her depo (end of June/July).  Hot sweats with drenching sweat at night for months. No SOB, hemoptysis, no vaginal dryness or mood swings. No thyroid type sx of skin/hair/nail/BM changes. Goes to bed at 8pm, wakes at 11pm and often stays awake until work at 7. Has been going on for a few months.      Has TV on before she falls asleep. Drinks some intermittent caffeinated soda (pepsi). Cup of coffee in the morning randomly.     Very stressed with life events and ruminates about this. Just started a new job at the [employer] as a . >40hr/week currently with work duties. Oldest daughter moved in (30yo). 13yo son not listening to her.     PHQ9 scored mostly for sleep and restlessness (0 for low mood or loss of interest). JAMAICA 7 score of 9. Not currently interested in counseling.    Depo shot today. She got this in clinic today before my visit. We talked about in the future to consider coming off this, trial estrogen type OCPs instead to better assess her menses.     ROS: constitutional, cardiovascular, respiratory, GI, , MSK systems reviewed and negative except as noted above.    Social:  Doesn't smoke (former), minimal alcohol use          Objective   /90 (BP Location: Left arm)  Pulse 80  Temp 98.2  F (36.8  C) (Oral)  Resp 16  Ht 5' 7.25\" (170.8 cm)  Wt 151 lb 6.4 oz (68.7 kg)  SpO2 99%  BMI 23.54 kg/m2 Body mass index is 23.54 kg/(m^2).    Wt Readings from Last 3 Encounters:   10/17/17 151 lb 6.4 oz (68.7 kg)   07/31/17 149 lb (67.6 kg)   05/31/17 " 151 lb 6.4 oz (68.7 kg)       Gen: healthy, fatigue appearing, tearful, eyes are injected from crying recently  HEENT: No asymmetry, MMM  CV: RRR, no murmurs. 2+ peripheral pulses  Lungs: CTAB, no wheezing or crackles, normal effort  Abd: soft, ND/NT  Extremities: warm, no edema.   Left forearm 1/2cm small venous lake which is reducible with pressure. (Provided reassurance)  Psych: mood neutral, affect appropriate  Neuro: CN II- XII grossly intact, no focal deficits. Normal strength and tone; mentation appears intact and speech normal      Labs/Imaging this visit:  No results found for this or any previous visit (from the past 100 hour(s)).         Assessment & Plan     47 year old female with:    Benign essential hypertension  Refilled. Will check BPs for 1 week data to review at next visit  - amLODIPine (NORVASC) 10 MG tablet; Take 1 tablet (10 mg) by mouth daily  - hydrochlorothiazide (HYDRODIURIL) 25 MG tablet; Take 1 tablet (25 mg) by mouth daily    JAMAICA (generalized anxiety disorder)  PHQ9 scored mostly for sleep and restlessness (0 for low mood or loss of interest). JAMAICA 7 score of 9. Not currently interested in counseling.  - START sertraline (ZOLOFT) 25 MG tablet; Take 1 tablet (25 mg) by mouth daily for 2 weeks, then increase to 2 tablets (50mg)- This med should be taken before bedtime.  - f/u 4 weeks to increase dose further if finding good effect    Menopausal syndrome (hot flashes)  Recommended to start SSRI for this, as above.  -in future, could consider gabapentin or estrogen OCP    Insomnia, unspecified type  Provided basic sleep hygiene tips- pt will primarily focus on avoiding TV before bed, trial sleep relaxation techniques. SSRI will additionally help.    Encounter for initial prescription of injectable contraceptive  Filled for next visit, however by that time in Jan pt may be more interested in switching to oral estrogen OCPs that we talked about today  - medroxyPROGESTERone (DEPO-PROVERA) 150  MG/ML injection; Inject 1 mL (150 mg) into the muscle every 3 months for 20 days    Encounter for surveillance of injectable contraceptive  - Depo injection given today: medroxyPROGESTERone (DEPO-PROVERA) injection 150 mg (Charge)       Follow up: 4 weeks  Return to care as needed if symptoms worsen or fail to improve.    Anna De Souza MD    Precepted today with: Dick Cronin MD  -------  PMH:  Patient Active Problem List   Diagnosis     HTN (hypertension)     Encounter for initial prescription of injectable contraceptive      Current Outpatient Prescriptions   Medication     hydrochlorothiazide (HYDRODIURIL) 25 MG tablet     medroxyPROGESTERone (DEPO-PROVERA) 150 MG/ML injection     amLODIPine (NORVASC) 10 MG tablet     metroNIDAZOLE (FLAGYL) 500 MG tablet     order for DME     No current facility-administered medications for this visit.       ALLERGIES: Nka [no known allergies]    Options for treatment and follow-up care were reviewed with the patient. Bharath Forbes engaged in the decision making process and verbalized understanding of the options discussed and agreed with the final plan.

## 2017-10-18 DIAGNOSIS — N95.1 MENOPAUSAL SYNDROME (HOT FLASHES): ICD-10-CM

## 2017-10-18 DIAGNOSIS — F41.1 GAD (GENERALIZED ANXIETY DISORDER): ICD-10-CM

## 2017-10-18 DIAGNOSIS — G47.00 INSOMNIA, UNSPECIFIED TYPE: ICD-10-CM

## 2017-10-18 ASSESSMENT — ANXIETY QUESTIONNAIRES: GAD7 TOTAL SCORE: 9

## 2018-07-15 ENCOUNTER — TRANSFERRED RECORDS (OUTPATIENT)
Dept: HEALTH INFORMATION MANAGEMENT | Facility: CLINIC | Age: 48
End: 2018-07-15

## 2018-07-16 ENCOUNTER — TRANSFERRED RECORDS (OUTPATIENT)
Dept: HEALTH INFORMATION MANAGEMENT | Facility: CLINIC | Age: 48
End: 2018-07-16

## 2018-07-17 ENCOUNTER — TELEPHONE (OUTPATIENT)
Dept: FAMILY MEDICINE | Facility: CLINIC | Age: 48
End: 2018-07-17

## 2018-07-17 DIAGNOSIS — I10 BENIGN ESSENTIAL HYPERTENSION: ICD-10-CM

## 2018-07-17 DIAGNOSIS — N95.1 MENOPAUSAL SYNDROME (HOT FLASHES): ICD-10-CM

## 2018-07-17 DIAGNOSIS — F41.1 GAD (GENERALIZED ANXIETY DISORDER): ICD-10-CM

## 2018-07-17 DIAGNOSIS — G47.00 INSOMNIA, UNSPECIFIED TYPE: ICD-10-CM

## 2018-07-20 RX ORDER — HYDROCHLOROTHIAZIDE 25 MG/1
25 TABLET ORAL DAILY
Qty: 30 TABLET | Refills: 0 | Status: SHIPPED | OUTPATIENT
Start: 2018-07-20 | End: 2018-07-30

## 2018-07-20 RX ORDER — AMLODIPINE BESYLATE 10 MG/1
10 TABLET ORAL DAILY
Qty: 30 TABLET | Refills: 0 | Status: SHIPPED | OUTPATIENT
Start: 2018-07-20 | End: 2018-07-30

## 2018-07-21 NOTE — TELEPHONE ENCOUNTER
Refills sent for 30 day supply of amlodipine, hydrochlorothiazide and Zoloft for patient. She needs to schedule an appointment to be seen as it has been over 6 months since she was last seen.

## 2018-07-23 NOTE — TELEPHONE ENCOUNTER
Called and left message for patient to call me back. Also left message for pharmacy meds were sent to, to give patient message to make f/u appt.  ~ Haja WHITE (Chrissi) CMA

## 2018-07-25 ENCOUNTER — OFFICE VISIT (OUTPATIENT)
Dept: FAMILY MEDICINE | Facility: CLINIC | Age: 48
End: 2018-07-25
Payer: COMMERCIAL

## 2018-07-25 VITALS
OXYGEN SATURATION: 98 % | WEIGHT: 152.4 LBS | DIASTOLIC BLOOD PRESSURE: 89 MMHG | BODY MASS INDEX: 23.92 KG/M2 | SYSTOLIC BLOOD PRESSURE: 138 MMHG | HEART RATE: 67 BPM | HEIGHT: 67 IN | TEMPERATURE: 98.3 F

## 2018-07-25 DIAGNOSIS — Z87.898 HISTORY OF SYNCOPE: ICD-10-CM

## 2018-07-25 DIAGNOSIS — I10 BENIGN ESSENTIAL HYPERTENSION: ICD-10-CM

## 2018-07-25 DIAGNOSIS — E87.6 HYPOKALEMIA: Primary | ICD-10-CM

## 2018-07-25 LAB
BUN SERPL-MCNC: 9 MG/DL (ref 5–24)
CALCIUM SERPL-MCNC: 9.2 MG/DL (ref 8.5–10.4)
CHLORIDE SERPLBLD-SCNC: 99 MMOL/L (ref 94–109)
CO2 SERPL-SCNC: 30 MMOL/L (ref 20–32)
CREAT SERPL-MCNC: 0.8 MG/DL (ref 0.6–1.3)
EGFR CALCULATED (BLACK REFERENCE): >90 ML/MIN
EGFR CALCULATED (NON BLACK REFERENCE): 81 ML/MIN
GLUCOSE SERPL-MCNC: 96 MG/DL (ref 60–109)
POTASSIUM SERPL-SCNC: 3.4 MMOL/L (ref 3.4–5.3)
SODIUM SERPL-SCNC: 136 MMOL/L (ref 133–144)

## 2018-07-25 NOTE — PROGRESS NOTES
"HPI    Bharath Forbes is 48 year old yo female presenting for:    1. Follow up after syncopal episode   - patient was in [place] 2 weeks ago, and sustained a sycnopal episode  - this the first time patient had a syncopal episode  - as background:   - patient's sister recently passed away, and patient was in [place] for the    - syncopal episode happened at the    - patient had been down for 1-2 weeks prior and was \"eating close to nothing\" and not sleeping well  - patient was taken to hospital and found to have a potassium of 2.9   - was admitted for IV rehydration   - on discharge, K was 3.6   - today:   - patient states she is eating a little bit better - forces herself to eat despite low appetite   - still grieving, but has a good support system   - no chest pain, no shortness of breath, no light headedness, no motor weakness, no paresthesias, no history of seizures    Of note:  - patient was on hydrochlorothiazide for her BP which was stopped at discharge from the hospital       OBJECTIVE    Vitals  /89  Pulse 67  Temp 98.3  F (36.8  C) (Oral)  Ht 5' 6.5\" (168.9 cm)  Wt 152 lb 6.4 oz (69.1 kg)  LMP 2018  SpO2 98%  BMI 24.23 kg/m2    Physical Exam  General: No acute distress  Eyes: EOMI, PERRLA, normal sclera, normal conjunctiva  CV: RRR  Resp: CTA Back: no paraspinal tenderness, no spinal tenderness, no vertebral step offs appreciated  Abdomen: soft, non-distended, non-tender  Extremities: no cyanosis, no edema, capillary refill <2 seconds, well perfused  Neuro: no focal deficits noted, reflexes within normal limits    ASSESSMENT/PLAN    # Syncopal episode   - likely 2/2 to dehydration/hypovolemia at that time  - no symptoms concerning for ACS, CVA  - management for hypokalemia per below     # Hypokalemia  - 3.4 today, mild  - will continue to hold hydrochlorothiazide for now and have patient follow up in 1 week for re-check on BMP    #Hypertension  - hold " hydrochlorothiazide today   - continue amlodipine   - follow up in 1 week for BMP and decide on hydrochlorothiazide         Precepted with Dr. Santiago

## 2018-07-25 NOTE — MR AVS SNAPSHOT
"              After Visit Summary   7/25/2018    Bharath Forbes    MRN: [id number]           Patient Information     Date Of Birth          1970        Visit Information        Provider Department      7/25/2018 10:40 AM Palla, Misbah Yousuf, MD [facility]        Today's Diagnoses     Hypokalemia    -  1       Follow-ups after your visit        Your next 10 appointments already scheduled     Jul 30, 2018 10:20 AM CDT   Return Visit with Misbah Yousuf Palla, MD   [facility] ([facility])    [address] [place] [id number]   [phone]              Who to contact     Please call your clinic at [phone] to:    Ask questions about your health    Make or cancel appointments    Discuss your medicines    Learn about your test results    Speak to your doctor            Additional Information About Your Visit        Care EveryWhere ID     This is your Care EveryWhere ID. This could be used by other organizations to access your [place] medical records  [id number]        Your Vitals Were     Pulse Temperature Height Last Period Pulse Oximetry BMI (Body Mass Index)    67 98.3  F (36.8  C) (Oral) 5' 6.5\" (168.9 cm) 06/27/2018 98% 24.23 kg/m2       Blood Pressure from Last 3 Encounters:   07/25/18 138/89   10/17/17 147/84   07/31/17 130/81    Weight from Last 3 Encounters:   07/25/18 152 lb 6.4 oz (69.1 kg)   10/17/17 151 lb 6.4 oz (68.7 kg)   07/31/17 149 lb (67.6 kg)              We Performed the Following     Basic Metabolic Panel ([facility]) - Results < 1 hr        Primary Care Provider Office Phone # Fax #    Anna De Souza [phone]       [facility] [address] [place] [address]        Equal Access to Services     RODNEY BARBA : Hadii erika ku hadasho Soalfie, waaxda luqadaha, qaybta kaalmada adeegyada, waxay yaya barnes. So [facility] [phone].    ATENCIÓN: Si habla español, tiene a michelle disposición " servicios gratuitos de asistencia lingüística. Todd vasques [phone].    We comply with applicable federal civil rights laws and [place] laws. We do not discriminate on the basis of race, color, national origin, age, disability, sex, sexual orientation, or gender identity.            Thank you!     Thank you for choosing [facility]  for your care. Our goal is always to provide you with excellent care. Hearing back from our patients is one way we can continue to improve our services. Please take a few minutes to complete the written survey that you may receive in the mail after your visit with us. Thank you!             Your Updated Medication List - Protect others around you: Learn how to safely use, store and throw away your medicines at www.disposemymeds.org.          This list is accurate as of 7/25/18  1:06 PM.  Always use your most recent med list.                   Brand Name Dispense Instructions for use Diagnosis    amLODIPine 10 MG tablet    NORVASC    30 tablet    Take 1 tablet (10 mg) by mouth daily    Benign essential hypertension       hydrochlorothiazide 25 MG tablet    HYDRODIURIL    30 tablet    Take 1 tablet (25 mg) by mouth daily    Benign essential hypertension       medroxyPROGESTERone 150 MG/ML injection    DEPO-PROVERA    1 mL    Inject 1 mL (150 mg) into the muscle every 3 months for 20 days    Encounter for initial prescription of injectable contraceptive       order for DME     1 each    Blood pressure cuff and machine for essential HTN    Essential hypertension       sertraline 50 MG tablet    ZOLOFT    30 tablet    Take 1 tablet (50 mg) by mouth daily Take 1 tablet (50mg) by mouth at night.    Menopausal syndrome (hot flashes), Insomnia, unspecified type, JAMAICA (generalized anxiety disorder)

## 2018-07-30 ENCOUNTER — OFFICE VISIT (OUTPATIENT)
Dept: FAMILY MEDICINE | Facility: CLINIC | Age: 48
End: 2018-07-30
Payer: COMMERCIAL

## 2018-07-30 VITALS
HEART RATE: 66 BPM | BODY MASS INDEX: 24.17 KG/M2 | WEIGHT: 152 LBS | OXYGEN SATURATION: 99 % | RESPIRATION RATE: 16 BRPM | DIASTOLIC BLOOD PRESSURE: 100 MMHG | TEMPERATURE: 98.4 F | SYSTOLIC BLOOD PRESSURE: 170 MMHG

## 2018-07-30 DIAGNOSIS — I10 BENIGN ESSENTIAL HYPERTENSION: ICD-10-CM

## 2018-07-30 DIAGNOSIS — Z30.013 ENCOUNTER FOR INITIAL PRESCRIPTION OF INJECTABLE CONTRACEPTIVE: ICD-10-CM

## 2018-07-30 DIAGNOSIS — I10 ESSENTIAL HYPERTENSION: Primary | ICD-10-CM

## 2018-07-30 LAB
BUN SERPL-MCNC: 8 MG/DL (ref 5–24)
CALCIUM SERPL-MCNC: 9.3 MG/DL (ref 8.5–10.4)
CHLORIDE SERPLBLD-SCNC: 106 MMOL/L (ref 94–109)
CO2 SERPL-SCNC: 29 MMOL/L (ref 20–32)
CREAT SERPL-MCNC: 1 MG/DL (ref 0.6–1.3)
EGFR CALCULATED (BLACK REFERENCE): 75.8 ML/MIN
EGFR CALCULATED (NON BLACK REFERENCE): 62.6 ML/MIN
GLUCOSE SERPL-MCNC: 86 MG/DL (ref 60–109)
HCG UR QL: NEGATIVE
POTASSIUM SERPL-SCNC: 3.6 MMOL/L (ref 3.4–5.3)
SODIUM SERPL-SCNC: 139 MMOL/L (ref 133–144)

## 2018-07-30 RX ORDER — MEDROXYPROGESTERONE ACETATE 150 MG/ML
150 INJECTION, SUSPENSION INTRAMUSCULAR
Qty: 1 ML | Refills: 0 | OUTPATIENT
Start: 2018-07-30 | End: 2018-10-16

## 2018-07-30 RX ORDER — AMLODIPINE BESYLATE 10 MG/1
10 TABLET ORAL DAILY
Qty: 30 TABLET | Refills: 0 | Status: SHIPPED | OUTPATIENT
Start: 2018-07-30 | End: 2018-10-16

## 2018-07-30 RX ORDER — HYDROCHLOROTHIAZIDE 25 MG/1
25 TABLET ORAL DAILY
Qty: 30 TABLET | Refills: 0 | Status: SHIPPED | OUTPATIENT
Start: 2018-07-30 | End: 2018-10-16

## 2018-07-30 NOTE — PROGRESS NOTES
Preceptor Attestation:   Patient seen, evaluated and discussed with the resident. I have verified the content of the note, which accurately reflects my assessment of the patient and the plan of care.  Supervising Physician:Jv De Souza MD  [facility]

## 2018-07-30 NOTE — NURSING NOTE
I administered the following to Bharath Forbes.    MEDICATION: Depo Provera 150mg  ROUTE: IM  SITE: LUQ - Gluteus  DOSE: 150mg/1mL  LOT #: [id number]  :  [employer]  EXPIRATION DATE:  09/19  NDC#: 7296-6375-26     Was entire vial of medication used? Yes    Name of provider who requested the injection: Dr. Palla  Name of provider on site (faculty or community preceptor) at the time of performing the injection: AVINASH Curiel MAI, [facility]    Patient is to return between 10/15/18-11/05/18 for next depo

## 2018-07-30 NOTE — PROGRESS NOTES
HPI    Bharath Forbes is 48 year old yo female presenting for:    1. Hypokalemia follow up   - last seen on 7/25/2018 - refer to note for detail  - today:   - appetite improved   - eating/drinking more   - no concerns     OBJECTIVE    Vitals  BP (!) 170/100  Pulse 66  Temp 98.4  F (36.9  C) (Oral)  Resp 16  Wt 152 lb (68.9 kg)  SpO2 99%  BMI 24.17 kg/m2      Physical Exam  General: No acute distress  CV: RRR  Respiratory: CTA bilaterally, no wheezes/rhonchi/rhales appreciated, no respiratory distress  Neuro: no focal deficits noted, reflexes within normal limits  Psych: appropriate affect    ASSESSMENT/PLAN    # Hypokalemia  - resolved  - K today is 3.6    # Hypertension  - asymptomatic    - no headache, blurry vision, chest pain, paresthesias, confusion, motor weakness  - likely 2/2 to temporary hold on hydrochlorothiazide   - restart hydrochlorothiazide  - continue amlodipine   - re-check BMP in 2 weeks    #Contraception  - UPT negative  - depo today       Precepted with Dr. De Souza

## 2018-07-30 NOTE — MR AVS SNAPSHOT
After Visit Summary   7/30/2018    Bharath Forbes    MRN: [id number]           Patient Information     Date Of Birth          1970        Visit Information        Provider Department      7/30/2018 10:20 AM Palla, Misbah Yousuf, MD [facility]        Today's Diagnoses     Essential hypertension    -  1    Encounter for initial prescription of injectable contraceptive        Benign essential hypertension           Follow-ups after your visit        Your next 10 appointments already scheduled     Aug 13, 2018 10:00 AM CDT   Return Visit with Anna De Souza MD   [facility] ([facility])    [address] [place] [address]   [phone]              Who to contact     Please call your clinic at [phone] to:    Ask questions about your health    Make or cancel appointments    Discuss your medicines    Learn about your test results    Speak to your doctor            Additional Information About Your Visit        Care EveryWhere ID     This is your Care EveryWhere ID. This could be used by other organizations to access your [facility] medical records  [id number]        Your Vitals Were     Pulse Temperature Respirations Pulse Oximetry BMI (Body Mass Index)       66 98.4  F (36.9  C) (Oral) 16 99% 24.17 kg/m2        Blood Pressure from Last 3 Encounters:   07/30/18 (!) 170/100   07/25/18 138/89   10/17/17 147/84    Weight from Last 3 Encounters:   07/30/18 152 lb (68.9 kg)   07/25/18 152 lb 6.4 oz (69.1 kg)   10/17/17 151 lb 6.4 oz (68.7 kg)              We Performed the Following     Basic Metabolic Panel ([facility]) - Results < 1 hr     HCG Qualitative Urine (UPT)  ([facility])          Where to get your medicines      These medications were sent to [facility] [id number]  [place] [place] [address] [address] [place] [address] [place] [address]     Phone:  [phone]     amLODIPine 10 MG tablet    hydrochlorothiazide 25  MG tablet          Primary Care Provider Office Phone # Fax #    Anna Olga De Souza [phone]       [facility] [address] [place] [address]        Equal Access to Services     HERVE BARBA AH: Hadii erika nixon hadjoelleo Soomaali, waaxda luqadaha, qaybta kaalmada adeegyada, juan manuel santacruzn caritopop ontiveros laBrianflaquita barnes. So [facility] [phone].    ATENCIÓN: Si habla español, tiene a michelle disposición servicios gratuitos de asistencia lingüística. Llame al [phone].    We comply with applicable federal civil rights laws and [place] laws. We do not discriminate on the basis of race, color, national origin, age, disability, sex, sexual orientation, or gender identity.            Thank you!     Thank you for choosing [facility]  for your care. Our goal is always to provide you with excellent care. Hearing back from our patients is one way we can continue to improve our services. Please take a few minutes to complete the written survey that you may receive in the mail after your visit with us. Thank you!             Your Updated Medication List - Protect others around you: Learn how to safely use, store and throw away your medicines at www.disposemymeds.org.          This list is accurate as of 7/30/18  2:02 PM.  Always use your most recent med list.                   Brand Name Dispense Instructions for use Diagnosis    amLODIPine 10 MG tablet    NORVASC    30 tablet    Take 1 tablet (10 mg) by mouth daily    Benign essential hypertension       hydrochlorothiazide 25 MG tablet    HYDRODIURIL    30 tablet    Take 1 tablet (25 mg) by mouth daily    Benign essential hypertension       medroxyPROGESTERone 150 MG/ML injection    DEPO-PROVERA    1 mL    Inject 1 mL (150 mg) into the muscle every 3 months for 20 days    Encounter for initial prescription of injectable contraceptive       order for DME     1 each    Blood pressure cuff and machine for essential HTN    Essential  hypertension       sertraline 50 MG tablet    ZOLOFT    30 tablet    Take 1 tablet (50 mg) by mouth daily Take 1 tablet (50mg) by mouth at night.    Menopausal syndrome (hot flashes), Insomnia, unspecified type, [place] (generalized anxiety disorder)

## 2018-08-15 NOTE — PROGRESS NOTES
Preceptor Attestation:  Patient's case reviewed and discussed with Misbah Y. Palla, MD resident and I evaluated the patient. I agree with written assessment and plan of care.  Supervising Physician:  Kaiden Santiago MD MD  [facility]

## 2018-10-16 ENCOUNTER — OFFICE VISIT (OUTPATIENT)
Dept: FAMILY MEDICINE | Facility: CLINIC | Age: 48
End: 2018-10-16
Payer: COMMERCIAL

## 2018-10-16 VITALS
BODY MASS INDEX: 24.59 KG/M2 | WEIGHT: 153 LBS | HEART RATE: 88 BPM | DIASTOLIC BLOOD PRESSURE: 91 MMHG | SYSTOLIC BLOOD PRESSURE: 158 MMHG | OXYGEN SATURATION: 98 % | HEIGHT: 66 IN | TEMPERATURE: 99.3 F | RESPIRATION RATE: 16 BRPM

## 2018-10-16 DIAGNOSIS — Z11.4 SCREENING FOR HIV (HUMAN IMMUNODEFICIENCY VIRUS): ICD-10-CM

## 2018-10-16 DIAGNOSIS — Z30.42 ENCOUNTER FOR SURVEILLANCE OF INJECTABLE CONTRACEPTIVE: ICD-10-CM

## 2018-10-16 DIAGNOSIS — I10 BENIGN ESSENTIAL HYPERTENSION: Primary | ICD-10-CM

## 2018-10-16 DIAGNOSIS — Z30.013 ENCOUNTER FOR INITIAL PRESCRIPTION OF INJECTABLE CONTRACEPTIVE: ICD-10-CM

## 2018-10-16 LAB
ANION GAP SERPL CALCULATED.3IONS-SCNC: 14 MMOL/L (ref 5–18)
BUN SERPL-MCNC: 12 MG/DL (ref 8–22)
CALCIUM SERPL-MCNC: 9.5 MG/DL (ref 8.5–10.5)
CHLORIDE SERPL-SCNC: 103 MMOL/L (ref 98–107)
CHOLEST SERPL-MCNC: 152 MG/DL
CO2 SERPL-SCNC: 24 MMOL/L (ref 22–31)
CREAT SERPL-MCNC: 0.75 MG/DL (ref 0.6–1.1)
FASTING?: NORMAL
GLUCOSE SERPL-MCNC: 80 MG/DL (ref 70–125)
HDLC SERPL-MCNC: 58 MG/DL
HIV 1+2 AB+HIV1 P24 AG SERPL QL IA: NEGATIVE
LDLC SERPL CALC-MCNC: 77 MG/DL
POTASSIUM SERPL-SCNC: 3.3 MMOL/L (ref 3.5–5)
SODIUM SERPL-SCNC: 141 MMOL/L (ref 136–145)
TRIGL SERPL-MCNC: 85 MG/DL

## 2018-10-16 RX ORDER — MEDROXYPROGESTERONE ACETATE 150 MG/ML
150 INJECTION, SUSPENSION INTRAMUSCULAR
Qty: 1 ML | Refills: 0 | OUTPATIENT
Start: 2018-10-16 | End: 2020-01-29

## 2018-10-16 RX ORDER — MEDROXYPROGESTERONE ACETATE 150 MG/ML
150 INJECTION, SUSPENSION INTRAMUSCULAR
Qty: 1 ML | Refills: 0 | OUTPATIENT
Start: 2019-01-02 | End: 2019-12-09

## 2018-10-16 RX ORDER — AMLODIPINE BESYLATE 10 MG/1
10 TABLET ORAL DAILY
Qty: 90 TABLET | Refills: 1 | Status: SHIPPED | OUTPATIENT
Start: 2018-10-16 | End: 2019-01-23

## 2018-10-16 RX ORDER — HYDROCHLOROTHIAZIDE 25 MG/1
25 TABLET ORAL DAILY
Qty: 90 TABLET | Refills: 1 | Status: SHIPPED | OUTPATIENT
Start: 2018-10-16 | End: 2019-01-23

## 2018-10-16 NOTE — PROGRESS NOTES
Preceptor Attestation:  Patient's case reviewed and discussed with  Patient seen and discussed with the resident..  I agree with written assessment and plan of care.  Supervising Physician:  Estella Ingram MD  [facility]

## 2018-10-16 NOTE — MR AVS SNAPSHOT
After Visit Summary   10/16/2018    Bharath Forbes    MRN: [id number]           Patient Information     Date Of Birth          1970        Visit Information        Provider Department      10/16/2018 2:40 PM Ousmane Hutchison MD [facility]        Today's Diagnoses     Benign essential hypertension    -  1    Screening for HIV (human immunodeficiency virus)        Encounter for surveillance of injectable contraceptive          Care Instructions    Important Takeaway Points From This Visit:    I have refilled your medications    We will call with your lab results    Please get your depo shot every 3 months          As always, please call with any questions or concerns. I look forward to seeing you again soon!    Take care,  Dr. Hutchison    Your current medication list is printed. Please keep this with you - it is helpful to bring this current list to any other medical appointments. It can also be helpful if you ever go to the emergency room or hospital.    If you had lab testing today we will call you with the results. The phone number we will call with your results is # [phone] (home) . If this is not the best number please call our clinic and change the number.    If you need any refills, please call your pharmacy and they will contact us.    If you have any further concerns or wish to schedule another appointment, please call our office at [phone] during normal business hours (8-5, M-F).    If you have urgent medical questions that cannot wait, you may call [phone] at any time of day.    If you have a medical emergency, please call [phone].    Thank you for coming to [facility].              Follow-ups after your visit        Follow-up notes from your care team     Return in about 2 weeks (around 10/30/2018) for BP Recheck.      Your next 10 appointments already scheduled     Oct 30, 2018 10:20 AM CDT   Return Visit with Dick Peters MD   [facility])    [address] [place] [address]   [phone]              Who to contact     Please call your clinic at [phone] to:    Ask questions about your health    Make or cancel appointments    Discuss your medicines    Learn about your test results    Speak to your doctor            Additional Information About Your Visit        Care EveryWhere ID     This is your Care EveryWhere ID. This could be used by other organizations to access your [facility] medical records  [id number]        Your Vitals Were     Pulse Temperature Respirations Height Pulse Oximetry BMI (Body Mass Index)    88 99.3  F (37.4  C) (Oral) 16 5' 6\" (167.6 cm) 98% 24.69 kg/m2       Blood Pressure from Last 3 Encounters:   10/16/18 (!) 158/91   07/30/18 (!) 170/100   07/25/18 138/89    Weight from Last 3 Encounters:   10/16/18 153 lb (69.4 kg)   07/30/18 152 lb (68.9 kg)   07/25/18 152 lb 6.4 oz (69.1 kg)              We Performed the Following     Basic Metabolic Profile ([facility])     HIV Ag/Ab Screen [place] ([facility])     Lipid [place] ([facility]) - Results > 1hr          Today's Medication Changes          These changes are accurate as of 10/16/18 11:59 PM.  If you have any questions, ask your nurse or doctor.               These medicines have changed or have updated prescriptions.        Dose/Directions    * medroxyPROGESTERone 150 MG/ML injection   Commonly known as:  DEPO-PROVERA   This may have changed:  You were already taking a medication with the same name, and this prescription was added. Make sure you understand how and when to take each.   Used for:  Encounter for surveillance of injectable contraceptive   Changed by:  Ousmane Hutchison MD        Dose:  150 mg   Inject 1 mL (150 mg) into the muscle every 3 months for 1 day   Quantity:  1 mL   Refills:  0       * medroxyPROGESTERone 150 MG/ML injection   Commonly known as:  DEPO-PROVERA   This may have changed:  Another " medication with the same name was added. Make sure you understand how and when to take each.   Used for:  Encounter for initial prescription of injectable contraceptive   Changed by:  Ousmane Hutchison MD        Dose:  150 mg   Start taking on:  1/2/2019   Inject 1 mL (150 mg) into the muscle every 3 months   Quantity:  1 mL   Refills:  0       * Notice:  This list has 2 medication(s) that are the same as other medications prescribed for you. Read the directions carefully, and ask your doctor or other care provider to review them with you.         Where to get your medicines      These medications were sent to [facility] [address] [place] [address] [address] AT [address] [facility] [address]     Phone:  [phone]     amLODIPine 10 MG tablet    hydrochlorothiazide 25 MG tablet         Some of these will need a paper prescription and others can be bought over the counter.  Ask your nurse if you have questions.     You don't need a prescription for these medications     medroxyPROGESTERone 150 MG/ML injection    medroxyPROGESTERone 150 MG/ML injection                Primary Care Provider Office Phone # Fax #    Tamika SOW DO V[facility] [phone]       [address] [place] [address]        Equal Access to Services     HERVE BARBA AH: Hadii erika ku hadasho Soomaali, waaxda luqadaha, qaybta kaalmada adeegyada, waxay yaya barnes. So [place] [phone].    ATENCIÓN: Si habla español, tiene a michelle disposición servicios gratuitos de asistencia lingüística. ame al [phone].    We comply with applicable federal civil rights laws and [place] laws. We do not discriminate on the basis of race, color, national origin, age, disability, sex, sexual orientation, or gender identity.            Thank you!     Thank you for choosing [facility]  for your care. Our goal is always to provide you with excellent care. Hearing  back from our patients is one way we can continue to improve our services. Please take a few minutes to complete the written survey that you may receive in the mail after your visit with us. Thank you!             Your Updated Medication List - Protect others around you: Learn how to safely use, store and throw away your medicines at www.disposemymeds.org.          This list is accurate as of 10/16/18 11:59 PM.  Always use your most recent med list.                   Brand Name Dispense Instructions for use Diagnosis    amLODIPine 10 MG tablet    NORVASC    90 tablet    Take 1 tablet (10 mg) by mouth daily    Benign essential hypertension       hydrochlorothiazide 25 MG tablet    HYDRODIURIL    90 tablet    Take 1 tablet (25 mg) by mouth daily    Benign essential hypertension       * medroxyPROGESTERone 150 MG/ML injection    DEPO-PROVERA    1 mL    Inject 1 mL (150 mg) into the muscle every 3 months for 1 day    Encounter for surveillance of injectable contraceptive       * medroxyPROGESTERone 150 MG/ML injection   Start taking on:  1/2/2019    DEPO-PROVERA    1 mL    Inject 1 mL (150 mg) into the muscle every 3 months    Encounter for initial prescription of injectable contraceptive       order for DME     1 each    Blood pressure cuff and machine for essential HTN    Essential hypertension       * Notice:  This list has 2 medication(s) that are the same as other medications prescribed for you. Read the directions carefully, and ask your doctor or other care provider to review them with you.

## 2018-10-16 NOTE — NURSING NOTE
BP: 158/91    LAST PAP/EXAM: No results found for: PAP  URINE HCG:not indicated    The following medication was given:     MEDICATION: Depo Provera 150mg  ROUTE: IM  SITE: [facility]  : [employer]  LOT #: [id number]  EXP:01/2020  NEXT INJECTION DUE: 1/1/19 - 1/15/19   Provider: Dr. Talavera  Provider on site: Dr. Ingram  Administered by PMflorinda [facility]

## 2018-10-16 NOTE — PATIENT INSTRUCTIONS
Important Takeaway Points From This Visit:    I have refilled your medications    We will call with your lab results    Please get your depo shot every 3 months          As always, please call with any questions or concerns. I look forward to seeing you again soon!    Take care,  Dr. Hutchison    Your current medication list is printed. Please keep this with you - it is helpful to bring this current list to any other medical appointments. It can also be helpful if you ever go to the emergency room or hospital.    If you had lab testing today we will call you with the results. The phone number we will call with your results is # [phone] (home) . If this is not the best number please call our clinic and change the number.    If you need any refills, please call your pharmacy and they will contact us.    If you have any further concerns or wish to schedule another appointment, please call our office at [phone] during normal business hours (8-5, M-F).    If you have urgent medical questions that cannot wait, you may call [phone] at any time of day.    If you have a medical emergency, please call [phone].    Thank you for coming to [facility].

## 2018-10-16 NOTE — PROGRESS NOTES
"  SUBJECTIVE:                                                    Bharath Forbes is a 48 year old year old female who presents to clinic today for the following health issues:    Hypertension Follow-up:  Patient states she has been out of her blood pressure medications for approximately 1 month.  She takes hydrochlorothiazide 25 mg and amlodipine 10 mg.  She has noticed her blood pressures runs higher because of this.  She is interested in receiving refills for these medications.  She does not have a home blood pressure cuff.  She denies any chest pain, lightheadedness, headache, shortness of breath, extremity edema, vision changes.      Reviewing her records she was seen by my partner Dr. Palla back in July.  At that time she had recently returned from the [facility] area where she was hospitalized for syncope.  Patient states \"I had a small heart attack then\".  Records from her stay at \"[facility] \"are unavailable.  We will request these records.    Depo shot surveillance:  She is due for her yearly surveillance check with the Depo shot.  She is interested in HIV testing today.  She has no concerns with this medication.  She is within the time window for injection today.  Her weight is stable since July.      Patient is an established patient of this clinic.  ------------------------------------------------------------------------------------------------------------  Patient Active Problem List   Diagnosis     HTN (hypertension)     Encounter for surveillance of injectable contraceptive     Past Surgical History:   Procedure Laterality Date     NO HISTORY OF SURGERY         Social History   Substance Use Topics     Smoking status: Former Smoker     Packs/day: 0.25     Years: 1.00     Types: Cigarettes     Smokeless tobacco: Former User     Quit date: 2/26/2010     Alcohol use 0.0 oz/week     0 Standard drinks or equivalent per week      Comment: Ocassionally     Family History   Problem Relation Age of " Onset     Hypertension Mother      Diabetes No family hx of      Coronary Artery Disease No family hx of      Breast Cancer No family hx of      Cancer - colorectal No family hx of      Ovarian Cancer No family hx of      Prostate Cancer No family hx of      Other Cancer No family hx of      Cerebrovascular Disease No family hx of      Thyroid Disease No family hx of      Asthma No family hx of          Problem list and past medical, surgical, social, and family histories reviewed & adjusted, as indicated.    Current Outpatient Prescriptions   Medication Sig Dispense Refill     amLODIPine (NORVASC) 10 MG tablet Take 1 tablet (10 mg) by mouth daily 90 tablet 1     hydrochlorothiazide (HYDRODIURIL) 25 MG tablet Take 1 tablet (25 mg) by mouth daily 90 tablet 1     medroxyPROGESTERone (DEPO-PROVERA) 150 MG/ML injection Inject 1 mL (150 mg) into the muscle every 3 months for 1 day 1 mL 0     [START ON 1/2/2019] medroxyPROGESTERone (DEPO-PROVERA) 150 MG/ML injection Inject 1 mL (150 mg) into the muscle every 3 months 1 mL 0     order for DME Blood pressure cuff and machine for essential HTN 1 each 1     [DISCONTINUED] amLODIPine (NORVASC) 10 MG tablet Take 1 tablet (10 mg) by mouth daily 30 tablet 0     [DISCONTINUED] hydrochlorothiazide (HYDRODIURIL) 25 MG tablet Take 1 tablet (25 mg) by mouth daily 30 tablet 0     [DISCONTINUED] medroxyPROGESTERone (DEPO-PROVERA) 150 MG/ML injection Inject 1 mL (150 mg) into the muscle every 3 months 1 mL 0     Medication list reviewed and updated as indicated.    Allergies   Allergen Reactions     Nka [No Known Allergies]      Allergies reviewed and updated as indicated.  ----------------------------------------------------------------------------------------------------------------------  ROS:  Constitutional, HEENT, cardiovascular, pulmonary, GI, musculoskeletal, neuro, skin, and psych systems are negative, except as otherwise noted.    OBJECTIVE:     BP (!) 158/91  Pulse 88  Temp  "99.3  F (37.4  C) (Oral)  Resp 16  Ht 1.676 m (5' 6\")  Wt 69.4 kg (153 lb)  SpO2 98%  BMI 24.69 kg/m2  Body mass index is 24.69 kg/(m^2).  General: Appears well and in no acute distress.  Cardiovascular: Regular rate and rhythm, normal S1 and S2 without murmur. No extra heartsounds or friction rub. Radial pulses present and equal bilaterally.  Respiratory: Lungs clear to auscultation bilaterally. No wheezing or crackles. No prolonged expiration. Symmetrical chest rise.  Musculoskeletal: No gross extremity deformities. No peripheral edema. Normal muscle bulk.    Diagnostic Test Results:  HIV testing, BMP, and lipids pending    ASSESSMENT/PLAN:     1. Benign essential hypertension: Patient is not at goal today.  Restart medications including amlodipine and hydrochlorothiazide.  These medications unfortunately are not available in combination.  If considering additional therapy would consider valsartan as this can be incorporated into a 3 medication combination pill.  Check BMP today as well as lipid cascade.  Patient will follow-up in 2 weeks for blood pressure monitoring and will review hospitalization records from [place] at that time when available.  - amLODIPine (NORVASC) 10 MG tablet; Take 1 tablet (10 mg) by mouth daily  Dispense: 90 tablet; Refill: 1  - hydrochlorothiazide (HYDRODIURIL) 25 MG tablet; Take 1 tablet (25 mg) by mouth daily  Dispense: 90 tablet; Refill: 1  - Basic Metabolic Profile ([facility])  - BMP (future)  - Lipid [place] ([facility]) - Results > 1hr    2. Screening for HIV (human immunodeficiency virus):  - HIV Ag/Ab Screen [place] ([facility])    3. Encounter for surveillance of injectable contraceptive: Will give today. Needs to be renewed in 1 year. Ok to give every 3 months.    10/16/2018  Plan Documentation  Service ordered Depo Provera injection (150mg IM) may be given every 3 months for one year per mode.  Plan and order should be renewed one year from 10/16/2018 .  Ordered " by Ousmane Hutchison.    - medroxyPROGESTERone (DEPO-PROVERA) 150 MG/ML injection; Inject 1 mL (150 mg) into the muscle every 3 months for 1 day  Dispense: 1 mL; Refill: 0      Schedule follow-up appointment in 2 week(s).      Medications Discontinued During This Encounter   Medication Reason     sertraline (ZOLOFT) 50 MG tablet Medication Reconciliation Clean Up     amLODIPine (NORVASC) 10 MG tablet Reorder     hydrochlorothiazide (HYDRODIURIL) 25 MG tablet Reorder     Ousmane Hutchison MD  [facility] Resident  Pager# [phone]    Precepted with: Zehra Ingram MD    Options for treatment and follow-up care were reviewed with the patient and/or guardian. Bharath ISAC Davisb and/or guardian engaged in the decision making process and verbalized understanding of the options discussed and agreed with the final plan    This chart is completed utilizing dictation software; typos and/or incorrect word substitutions may unintentionally occur.     The Following is for Coding Purposes Only    Dx Type # This visit   Self-Limited                  (1 pt ea) 0   Established, Stable      (1 pt ea) 1   Established, Worse      (2 pt ea) 1   New, Simple                 (3 pt ea) 0   New, Further Work-Up (4 pt ea) 0       Total Points 3 - Moderate       Data Reviewed    Decision to Obtain Records                 (1 pt) 1   Review/Summarize Old Records         (2 pt) 0   Order/Review Labs                              (1 pt) 1   Order/Review Radiology                      (1 pt) 0   Order/Review Medical Tests                (1 pt) 0   Independent Review of EKG/XRay (2 pt ea) 0       Total Points 2 - Low       Risk    1       One Minor Problem No   Basic Labs / Imaging / EKG Yes   Supportive Cares No   2       2+ Minor / Stable Chronic / Simple Acute Problem   Yes   Unstressed Test (Biopsy, PFT's, etc) No   OTC Meds / PT/OT No   3       Complicated Acute / Worse Chronic / 2+ Stable / Undiagnosed  Yes   Stress Test  and Endoscopies No   Prescription Drugs or Treatment of Closed Injury Yes   4       Life Threatening Condition No   Rx With Monitoring / De-Escalate Care For Poor Prognosis  No   Level 3

## 2018-12-06 ENCOUNTER — TELEPHONE (OUTPATIENT)
Dept: FAMILY MEDICINE | Facility: CLINIC | Age: 48
End: 2018-12-06

## 2018-12-06 NOTE — TELEPHONE ENCOUNTER
[facility] phone call message- medication clarification/question:    Full Medication Name: amLODIPine (NORVASC) 10 MG, hydrochlorothiazide (HYDRODIURIL) 25 MG    Dose:     Question: Patient stated she seen on the news and on facebook that there was a recall on both of the medications listed above. She stated she is concerned and has stopped taking these medications due to this. She stated she would like a call back regarding this concern and on what she should do. Patient stated it is ok to leave a detailed message if she is unable to answer. Please call and advise.     Pharmacy confirmed as    [id number] - [place], [place] - [address]  [facility] [address] [place], [place] - [address] AT [facility] & [address] [place] [address] DRUG STORE [address] [place], [place] - [address] AT [facility]: Yes    OK to leave a message on voice mail? Yes    Primary language: English      needed? No    Call taken on December 6, 2018 at 12:39 PM by Radha Alonzo

## 2018-12-06 NOTE — TELEPHONE ENCOUNTER
Returned patient's phone call and requested she discuss with the pharmacy she filled the medications at due to specific lot numbers and manufacturers. Patient verbalized understanding. Guido CARLOS

## 2019-01-23 ENCOUNTER — OFFICE VISIT (OUTPATIENT)
Dept: FAMILY MEDICINE | Facility: CLINIC | Age: 49
End: 2019-01-23
Payer: COMMERCIAL

## 2019-01-23 VITALS
TEMPERATURE: 98 F | RESPIRATION RATE: 20 BRPM | HEART RATE: 78 BPM | HEIGHT: 67 IN | SYSTOLIC BLOOD PRESSURE: 126 MMHG | WEIGHT: 158 LBS | DIASTOLIC BLOOD PRESSURE: 84 MMHG | OXYGEN SATURATION: 97 % | BODY MASS INDEX: 24.8 KG/M2

## 2019-01-23 DIAGNOSIS — Z30.013 ENCOUNTER FOR INITIAL PRESCRIPTION OF INJECTABLE CONTRACEPTIVE: ICD-10-CM

## 2019-01-23 DIAGNOSIS — I10 BENIGN ESSENTIAL HYPERTENSION: ICD-10-CM

## 2019-01-23 DIAGNOSIS — Z30.8 ENCOUNTER FOR OTHER CONTRACEPTIVE MANAGEMENT: Primary | ICD-10-CM

## 2019-01-23 DIAGNOSIS — I10 ESSENTIAL HYPERTENSION: ICD-10-CM

## 2019-01-23 LAB
BUN SERPL-MCNC: 17 MG/DL (ref 5–24)
CALCIUM SERPL-MCNC: 9.6 MG/DL (ref 8.5–10.4)
CHLORIDE SERPLBLD-SCNC: 101 MMOL/L (ref 94–109)
CO2 SERPL-SCNC: 30 MMOL/L (ref 20–32)
CREAT SERPL-MCNC: 0.9 MG/DL (ref 0.6–1.3)
EGFR CALCULATED (BLACK REFERENCE): 85.6 ML/MIN
EGFR CALCULATED (NON BLACK REFERENCE): 70.7 ML/MIN
GLUCOSE SERPL-MCNC: 86 MG/DL (ref 60–109)
HCG UR QL: NEGATIVE
POTASSIUM SERPL-SCNC: 3 MMOL/L (ref 3.4–5.3)
SODIUM SERPL-SCNC: 145 MMOL/L (ref 133–144)

## 2019-01-23 RX ORDER — MEDROXYPROGESTERONE ACETATE 150 MG/ML
150 INJECTION, SUSPENSION INTRAMUSCULAR
Qty: 1 ML | Refills: 0 | Status: CANCELLED | OUTPATIENT
Start: 2019-04-10 | End: 2019-05-01

## 2019-01-23 RX ORDER — MEDROXYPROGESTERONE ACETATE 150 MG/ML
150 INJECTION, SUSPENSION INTRAMUSCULAR ONCE
Status: COMPLETED | OUTPATIENT
Start: 2019-01-23 | End: 2019-01-23

## 2019-01-23 RX ORDER — HYDROCHLOROTHIAZIDE 25 MG/1
25 TABLET ORAL DAILY
Qty: 90 TABLET | Refills: 1 | Status: SHIPPED | OUTPATIENT
Start: 2019-01-23 | End: 2020-01-29

## 2019-01-23 RX ORDER — AMLODIPINE BESYLATE 10 MG/1
10 TABLET ORAL DAILY
Qty: 90 TABLET | Refills: 1 | Status: SHIPPED | OUTPATIENT
Start: 2019-01-23 | End: 2020-01-27

## 2019-01-23 RX ADMIN — MEDROXYPROGESTERONE ACETATE 150 MG: 150 INJECTION, SUSPENSION INTRAMUSCULAR at 14:42

## 2019-01-23 ASSESSMENT — MIFFLIN-ST. JEOR: SCORE: 1374.31

## 2019-01-23 NOTE — NURSING NOTE
Prior to injection, I verified the patient identity using patient's name and date of birth. Patient was instructed to report any adverse reaction to me immediately.    I administered the injection to Bharath Forbes.    Was entire vial of medication used? Yes    Did the patient bring this medication to the clinic to be injected? No    Name of provider who requested the injection:    Name of provider on site (faculty or community preceptor) at the time of performing the injection:     Date of next injection: 04/10/19-05/01/2019  Date of next office visit with provider to renew medication plan (must be seen annually): 10/18/2019    Jannet Baez, CMA

## 2019-01-23 NOTE — PROGRESS NOTES
HPI:     Bharath Forbes is a 48 year old  female with PMH of HTN and depo use who presents for depo, back pain, and follow up BP.     Bharath Forbes is here on her own.     Low back pain starting December  No leg pain, no numbness/tingling, no bowel/bladder incontinence. No leg weakness.   Works as house keeper  Went to urgent care and emergency room.   Got an xray at urgent care which was normal.   Used ibuprofen but that hurt her stomach so she backed off  Tylenol helps    Due for depo  Shes a couple days late for depo  No periods while on this med.             PMHX:     Patient Active Problem List   Diagnosis     HTN (hypertension)     Encounter for surveillance of injectable contraceptive       Current Outpatient Medications   Medication Sig Dispense Refill     amLODIPine (NORVASC) 10 MG tablet Take 1 tablet (10 mg) by mouth daily 90 tablet 1     hydrochlorothiazide (HYDRODIURIL) 25 MG tablet Take 1 tablet (25 mg) by mouth daily 90 tablet 1     medroxyPROGESTERone (DEPO-PROVERA) 150 MG/ML injection Inject 1 mL (150 mg) into the muscle every 3 months 1 mL 0     medroxyPROGESTERone (DEPO-PROVERA) 150 MG/ML injection Inject 1 mL (150 mg) into the muscle every 3 months for 1 day 1 mL 0     order for DME Blood pressure cuff and machine for essential HTN (Patient not taking: Reported on 1/23/2019) 1 each 1       Social History     Tobacco Use     Smoking status: Former Smoker     Packs/day: 0.25     Years: 1.00     Pack years: 0.25     Types: Cigarettes     Smokeless tobacco: Former User     Quit date: 2/26/2010   Substance Use Topics     Alcohol use: Yes     Alcohol/week: 0.0 oz     Comment: Ocassionally     Drug use: No       Social History     Social History Narrative     Not on file       Allergies   Allergen Reactions     Nka [No Known Allergies]        No results found for this or any previous visit (from the past 24 hour(s)).         Review of Systems:   7 point ROS negative except as noted.   "         Physical Exam:     Vitals:    01/23/19 1429   BP: 126/84   BP Location: Left arm   Patient Position: Sitting   Cuff Size: Adult Regular   Pulse: 78   Resp: 20   Temp: 98  F (36.7  C)   TempSrc: Oral   SpO2: 97%   Weight: 71.7 kg (158 lb)   Height: 1.702 m (5' 7\")     Body mass index is 24.75 kg/m .    General: Alert, well-appearing female in NAD  HEENT: PERRL, moist oral mucus membranes  Pulm: CTA BL, no tachypnea  CV: RRR, no murmur  Back: Normal flexion, extension, lateral bending and rotational active ROM. Pain with palpation of bilateral lumbar paraspinal muscles. Normal hip flex/ext strength. Normal dorsi and plantar flexion strength  Ext: Warm, well perfused, 2+ BL radial pulses, no LE edema  Skin: No rash on limited skin exam  Psych: Mood appropriate to visit content, full affect, rational thought content and process      Assessment and Plan     1. Encounter for other contraceptive management  UPT negative. She is late (due 1/15) but we opted to give depo today. I did advise that there is a possibility of false negative UPT but that is unlikely.   - HCG Qualitative Urine (UPT)  ([facility])  - medroxyPROGESTERone (DEPO-PROVERA) syringe 150 mg; Inject 1 mL (150 mg) into the muscle once    2. Essential hypertension  BP well controlled today. Last BMP showed K of 3.3, will recheck today. Given that her regimen is working, I would add potassium supplement if it's still low today. Refilled current blood pressure meds.   - Basic Metabolic Panel ([facility]) - Results < 1 hr  - amLODIPine (NORVASC) 10 MG tablet; Take 1 tablet (10 mg) by mouth daily  Dispense: 90 tablet; Refill: 1  - hydrochlorothiazide (HYDRODIURIL) 25 MG tablet; Take 1 tablet (25 mg) by mouth daily  Dispense: 90 tablet; Refill: 1    3. Lumbar strain  No red flag signs. Xray has already been done. Likely from repetitive movements at work. Discussed therapeutic cares today including exercises. I recommended that she:  --Lie prone on ground at least " twice a day until that is not painful  --When that is not painful, lift head up by placing hands on the ground right under shoulders and gently raise head  --When that's not painful, lift head further  --Proceed to the exercises I've outlined in the visit summary        Medications Discontinued During This Encounter   Medication Reason     amLODIPine (NORVASC) 10 MG tablet Reorder     hydrochlorothiazide (HYDRODIURIL) 25 MG tablet Reorder       Options for treatment and follow-up care were reviewed with the patient and/or guardian. Bharath Forbes and/or guardian engaged in the decision making process and verbalized understanding of the options discussed and agreed with the final plan.    Miesha Curry MD  [facility]   Pager: [phone]

## 2019-01-25 NOTE — RESULT ENCOUNTER NOTE
Could you call the patient with the following message? Thank you!    Dear Bharath,    Your potassium is still low. I would recommend starting a potassium supplement. I have sent this to your pharmacy. We will need to recheck your potassium level in 2 weeks. Please make a lab appointment to have this blood draw.    Sincerely,  Dr. Miesha Curry

## 2019-01-28 ENCOUNTER — TELEPHONE (OUTPATIENT)
Dept: FAMILY MEDICINE | Facility: CLINIC | Age: 49
End: 2019-01-28

## 2019-01-28 DIAGNOSIS — E87.6 HYPOKALEMIA: Primary | ICD-10-CM

## 2019-01-28 NOTE — TELEPHONE ENCOUNTER
[facility] Family [facility] phone call message- patient requesting results:    Test: Lab    Date of test: 01/23/2019    Additional Comments: Patient returning your call. Please call and advise.     OK to leave a message on voice mail?     Primary language: English      needed? No    Call taken on January 28, 2019 at 12:26 PM by Vincenzo Leon     Detail Level: Detailed Size Of Lesion: 4mm Size Of Lesion: 4mm repigmented nevus Size Of Lesion: 5mm Size Of Lesion: 3mm Size Of Lesion: 3mm x 1mm

## 2019-01-29 NOTE — TELEPHONE ENCOUNTER
[facility] Family [facility] phone call message- general phone call:    Reason for call: Patient called stating she missed a call from Payton. Please call and advise.     Return call needed: Yes    OK to leave a message on voice mail? Yes    Primary language: English      needed? No    Call taken on January 29, 2019 at 11:03 AM by Radha Alonzo

## 2019-04-15 ENCOUNTER — ALLIED HEALTH/NURSE VISIT (OUTPATIENT)
Dept: FAMILY MEDICINE | Facility: CLINIC | Age: 49
End: 2019-04-15
Payer: COMMERCIAL

## 2019-04-15 VITALS
TEMPERATURE: 98.9 F | SYSTOLIC BLOOD PRESSURE: 155 MMHG | HEART RATE: 94 BPM | WEIGHT: 161.8 LBS | BODY MASS INDEX: 25.34 KG/M2 | OXYGEN SATURATION: 97 % | RESPIRATION RATE: 22 BRPM | DIASTOLIC BLOOD PRESSURE: 85 MMHG

## 2019-04-15 DIAGNOSIS — Z30.8 ENCOUNTER FOR OTHER CONTRACEPTIVE MANAGEMENT: Primary | ICD-10-CM

## 2019-04-15 RX ORDER — MEDROXYPROGESTERONE ACETATE 150 MG/ML
150 INJECTION, SUSPENSION INTRAMUSCULAR
Status: DISCONTINUED | OUTPATIENT
Start: 2019-04-15 | End: 2019-10-16

## 2019-04-15 RX ADMIN — MEDROXYPROGESTERONE ACETATE 150 MG: 150 INJECTION, SUSPENSION INTRAMUSCULAR at 10:33

## 2019-04-15 NOTE — NURSING NOTE
Prior to medication administration, I verified the patient identity using patient's name and date of birth. Patient was instructed to report any adverse reaction to me immediately.    I administered medroxyprogesterone  to Bharath Forbes.    For injections only, was entire vial of medication used? Yes    Did the patient bring this medication to the clinic to be administered? No    Name of provider who requested the medication administration: Dr. Curry  Name of provider on site (faculty or community preceptor) at the time of performing the medication administration: Dr. Jv Read    Date of next administration: 7/1 to 7/22/19  Date of next office visit with provider to renew medication plan (must be seen annually): 10/19    PARTHA Christopher

## 2019-07-19 ENCOUNTER — ALLIED HEALTH/NURSE VISIT (OUTPATIENT)
Dept: FAMILY MEDICINE | Facility: CLINIC | Age: 49
End: 2019-07-19
Payer: COMMERCIAL

## 2019-07-19 VITALS
WEIGHT: 160.38 LBS | HEIGHT: 67 IN | SYSTOLIC BLOOD PRESSURE: 149 MMHG | OXYGEN SATURATION: 98 % | TEMPERATURE: 99.8 F | BODY MASS INDEX: 25.17 KG/M2 | HEART RATE: 85 BPM | DIASTOLIC BLOOD PRESSURE: 84 MMHG

## 2019-07-19 DIAGNOSIS — Z30.42 SURVEILLANCE FOR DEPO-PROVERA CONTRACEPTION: Primary | ICD-10-CM

## 2019-07-19 RX ORDER — MEDROXYPROGESTERONE ACETATE 150 MG/ML
150 INJECTION, SUSPENSION INTRAMUSCULAR
Status: DISCONTINUED | OUTPATIENT
Start: 2019-07-19 | End: 2019-10-16

## 2019-07-19 RX ADMIN — MEDROXYPROGESTERONE ACETATE 150 MG: 150 INJECTION, SUSPENSION INTRAMUSCULAR at 14:13

## 2019-07-19 ASSESSMENT — MIFFLIN-ST. JEOR: SCORE: 1378.96

## 2019-07-19 NOTE — NURSING NOTE
Clinic Administered Medication Documentation      Depo Provera Documentation    Prior to injection, verified patient identity using patient's name and date of birth. Medication was administered. Please see MAR and medication order for additional information. Patient instructed to report any adverse reaction to staff immediately .    BP: 149/84    LAST PAP/EXAM: No results found for: PAP  URINE HCG:not indicated    NEXT INJECTION DUE: 10/4/19 - 10/18/19    Was entire vial of medication used? Yes  Vial/Syringe: Single dose vial  Expiration Date: 10/20      Name of provider who requested the medication administration: Dr Colvin  Name of provider on site (faculty or community preceptor) at the time of performing the medication administration: Dr Rowe    Date of next administration: 10/04/2019 to 10/25/119  Date of next office visit with provider to renew medication plan (must be seen annually): 04/15/2019    Verify medication /Depo with Payton Still CMA    Depo give to patient Rt gluteus

## 2019-09-24 ENCOUNTER — OFFICE VISIT (OUTPATIENT)
Dept: FAMILY MEDICINE | Facility: CLINIC | Age: 49
End: 2019-09-24
Payer: COMMERCIAL

## 2019-09-24 VITALS
OXYGEN SATURATION: 99 % | HEART RATE: 87 BPM | SYSTOLIC BLOOD PRESSURE: 171 MMHG | TEMPERATURE: 98.2 F | DIASTOLIC BLOOD PRESSURE: 99 MMHG | RESPIRATION RATE: 20 BRPM | BODY MASS INDEX: 25.68 KG/M2 | WEIGHT: 163.6 LBS | HEIGHT: 67 IN

## 2019-09-24 DIAGNOSIS — N89.8 VAGINAL DISCHARGE: ICD-10-CM

## 2019-09-24 DIAGNOSIS — N30.00 ACUTE CYSTITIS WITHOUT HEMATURIA: ICD-10-CM

## 2019-09-24 DIAGNOSIS — F41.9 ANXIETY: Primary | ICD-10-CM

## 2019-09-24 DIAGNOSIS — R30.0 DYSURIA: ICD-10-CM

## 2019-09-24 LAB
BACTERIA: NORMAL
BACTERIA: NORMAL
BILIRUBIN UR: NEGATIVE
BLOOD UR: ABNORMAL
CASTS: NEGATIVE /LPF
CLARITY, URINE: CLEAR
CLUE CELLS: NORMAL
COLOR UR: YELLOW
CRYSTAL URINE: NEGATIVE /LPF
EPITHELIAL CELLS UR: NORMAL /LPF (ref 0–2)
GLUCOSE URINE: NEGATIVE
KETONES UR QL: NEGATIVE
LEUKOCYTE ESTERASE UR: ABNORMAL
MOTILE TRICHOMONAS: NEGATIVE
MUCOUS URINE: NEGATIVE LPF
NITRITE UR QL STRIP: NEGATIVE
ODOR: NORMAL
PH UR STRIP: 7 [PH] (ref 4.5–8)
PH WET PREP: >4.5 (ref 3.8–4.5)
PROTEIN UR: NEGATIVE
RBC URINE: NEGATIVE /HPF
SP GR UR STRIP: 1.02 (ref 1–1.03)
UROBILINOGEN UR STRIP-ACNC: ABNORMAL
WBC URINE: NORMAL /HPF
WBC WET PREP: <2 (ref 2–5)
YEAST: NORMAL

## 2019-09-24 RX ORDER — NITROFURANTOIN 25; 75 MG/1; MG/1
100 CAPSULE ORAL 2 TIMES DAILY
Qty: 14 CAPSULE | Refills: 0 | Status: SHIPPED | OUTPATIENT
Start: 2019-09-24 | End: 2020-01-29

## 2019-09-24 RX ORDER — CITALOPRAM HYDROBROMIDE 10 MG/1
10 TABLET ORAL DAILY
Qty: 30 TABLET | Refills: 0 | Status: SHIPPED | OUTPATIENT
Start: 2019-09-24 | End: 2020-01-29

## 2019-09-24 ASSESSMENT — MIFFLIN-ST. JEOR: SCORE: 1395.45

## 2019-09-24 NOTE — LETTER
September 24, 2019      Bharath [place]  [address] [place] [address]        Dear Bharath,    Your labs show that you have a urine infection. I have sent in an antibiotic for you. You do not have a yeast infection. You do not have trichomonas. We are still waiting for your other lab tests.     Please see below for your test results.    Resulted Orders   Urinalysis, Micro If ([facility])   Result Value Ref Range    Specific Gravity Urine 1.020 1.005 - 1.030    pH Urine 7.0 4.5 - 8.0    Leukocyte Esterase UR Trace (A) NEGATIVE    Nitrite Urine Negative NEGATIVE    Protein UR Negative NEGATIVE    Glucose Urine Negative NEGATIVE    Ketones Urine Negative NEGATIVE    Urobilinogen mg/dL 0.2 E.U./dL 0.2 E.U./dL    Bilirubin UR Negative NEGATIVE    Blood UR 2+ (A) NEGATIVE    Color Urine Yellow     Clarity, urine Clear    Urine Microscopic ([facility])   Result Value Ref Range    WBC Urine 2-5 <5 /hpf    RBC Urine NEGATIVE <5 /hpf    Epithelial Cells UR 25-50 0 - 2 /lpf    Mucous Urine NEGATIVE NONE lpf    Casts Urine NEGATIVE NONE /lpf    Crystal Urine NEGATIVE NONE /lpf    Bacteria Wet Prep Few None   Wet Prep ([facility])   Result Value Ref Range    Yeast Wet Prep None none    Motile Trichomonas Wet Prep Negative Negative    Clue Cells Wet Prep Present <20% NONE    WBC WET PREP <2 2 - 5    Bacteria Wet Prep Few None    pH Wet Prep >4.5 3.8 - 4.5    Odor Wet Prep None NONE       If you have any questions, please call the clinic to make an appointment.    Sincerely,    Miesha Curry MD

## 2019-09-24 NOTE — RESULT ENCOUNTER NOTE
Could you call the patient with the following message? Thank you!    Dear Bharath,    Your labs show that you have a urine infection. I have sent in an antibiotic for you. You do not have a yeast infection. You do not have trichomonas. We are still waiting for your other lab tests.     Sincerely,  Dr. Miesha Curry
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
## 2019-09-25 LAB
C TRACH RRNA CVX QL NAA+PROBE: NEGATIVE
N GONORRHOEA RRNA SPEC QL NAA+PROBE: NEGATIVE

## 2019-09-25 NOTE — RESULT ENCOUNTER NOTE
Could you call the patient with the following message? Thank you!    Dear Bharath,    Your tests showed that you do not have gonorrhea or chlamydia.    Sincerely,  Dr. Miesha Curry

## 2019-09-26 ENCOUNTER — DOCUMENTATION ONLY (OUTPATIENT)
Dept: PSYCHOLOGY | Facility: CLINIC | Age: 49
End: 2019-09-26

## 2019-09-26 NOTE — PROGRESS NOTES
Patient has had a number of recent life stressors. Ok to see Dr. Hamm. If our schedules do not align, patient can be seen at any of the following:    [address],  [place] [address]  [phone] Phone  M-Sat 8-8pm    Family [address] [address]  [phone] Phone  [phone] Fax  M-Th 8-8pm  F 8-4:30pm      ===View-only below this line===  ----- Message -----  From: Tierra Aguilar MD  Sent: 2019  11:51 AM CDT  To:  Mental Health  Subject: Order for KRISTEN VEGA        [id number]               : 1970  F     [address]                                    PCP: [id number] [place] [id number]                                  CTR: [facility]        Name: KRISTEN VEGA Date: 2019    Home: [phone]    Payor:              [employer]    Plan:               [employer] [place]  Sponsor Code:       [id number]  Subscriber ID:      [id number]  Subscriber Name:    KRISTEN VEGA  Subscriber Address: [address] [place] [address] [address] [place] [id number]    Effective From:     19  Effective To:         Group Number:       [id number]  Group Name  : Not Available      Date       Provider                   Department   Center         2019  [id number]-UABRFHTIERRA AGUILAR  VFAM        [facility] Owned    Order Date:2019  Ordering User:TIERRA MULLER [[email or web address]]  Encounter Provider:Tierra Aguilar MD [[id number]]  Authorizing Provider: Tierra Aguilar MD [[id number]]  Department:[facility] FAMILY MED[[id number]]    Ordering Provider NPI: [id number]  Tierra Aguilar  [facility]~[address] [place] [address]  Phone: [phone]        Procedure Requested      9035     MENTAL HEALTH REFERRAL  -             [#[id number]]      Priority: Routine  Class: External referral      Comment:Use this form for behavioral health consults  and assessments. The               referral coordinator will help to determine whether patients are               best served by clinic behavioral health staff or by community               providers.                              Type of referral(s) req  uested (indicate all that apply):               Adult Psychotherapy--for diagnosis and non-pharmacological                treatment                              Reason for referral: Anxiety and depression                              Currently receiving mental health services (if 'Yes', what                services and why today's referral?): No               Currently having suicidal thoughts: No               Previous psych hospitalization: No                                Please provide data for below screening tools if available.                               MCHAT (Autism Screen):                Pediatric Symptom Checklist (PSC):                                needed: No               Language: English    Associated Diagnoses      F41.9 Anxiety      Adult or Child/Adolescent:  Adult         Location:  [facility]        [id number]               : 0  1970  F     [address]                                    PCP: [id number] [place] [id number]                                  CTR: [facility]      Comments

## 2019-10-07 ENCOUNTER — TELEPHONE (OUTPATIENT)
Dept: FAMILY MEDICINE | Facility: CLINIC | Age: 49
End: 2019-10-07

## 2019-10-07 NOTE — LETTER
October 11, 2019       TO: Bharath Forbes  [address] [place] [address]         Dear Bharath,    Here is the mental health referral information. Please call with any additional questions, concerns or if you would like assistance with scheduling.    [address],  [place] [address]  [phone] Phone  M-Sat 8-8pm     [facility]  [address] [place] [address]  [phone] Phone  [phone] Fax  M-Th 8-8pm  F 8-4:30pm    [facility] [address] [place], [address]  Dr. Lashaun Hamm      Sincerely,    Nena Hyde CMA, Patient Care Coordinator  Direct Phone: [phone]

## 2019-10-11 NOTE — TELEPHONE ENCOUNTER
Out going call made to f/u Mental health referral. LMTCB, Letter mailed to patient's home address listed in chart.

## 2019-10-16 ENCOUNTER — OFFICE VISIT (OUTPATIENT)
Dept: FAMILY MEDICINE | Facility: CLINIC | Age: 49
End: 2019-10-16
Payer: COMMERCIAL

## 2019-10-16 ENCOUNTER — RECORDS - HEALTHEAST (OUTPATIENT)
Dept: ADMINISTRATIVE | Facility: OTHER | Age: 49
End: 2019-10-16

## 2019-10-16 VITALS
HEIGHT: 67 IN | RESPIRATION RATE: 16 BRPM | BODY MASS INDEX: 25.9 KG/M2 | TEMPERATURE: 98.1 F | SYSTOLIC BLOOD PRESSURE: 131 MMHG | HEART RATE: 71 BPM | WEIGHT: 165 LBS | OXYGEN SATURATION: 98 % | DIASTOLIC BLOOD PRESSURE: 82 MMHG

## 2019-10-16 DIAGNOSIS — Z12.11 SPECIAL SCREENING FOR MALIGNANT NEOPLASMS, COLON: Primary | ICD-10-CM

## 2019-10-16 DIAGNOSIS — Z12.31 ENCOUNTER FOR SCREENING MAMMOGRAM FOR BREAST CANCER: ICD-10-CM

## 2019-10-16 DIAGNOSIS — Z30.42 ENCOUNTER FOR SURVEILLANCE OF INJECTABLE CONTRACEPTIVE: ICD-10-CM

## 2019-10-16 RX ORDER — BISACODYL 5 MG/1
TABLET, DELAYED RELEASE ORAL
Qty: 2 TABLET | Refills: 0 | Status: SHIPPED | OUTPATIENT
Start: 2019-10-16 | End: 2020-01-29

## 2019-10-16 RX ORDER — POLYETHYLENE GLYCOL 1450
POWDER (GRAM) MISCELLANEOUS
Qty: 238 G | Refills: 0 | Status: SHIPPED | OUTPATIENT
Start: 2019-10-16 | End: 2020-01-29

## 2019-10-16 RX ORDER — MEDROXYPROGESTERONE ACETATE 150 MG/ML
150 INJECTION, SUSPENSION INTRAMUSCULAR
Status: DISCONTINUED | OUTPATIENT
Start: 2019-10-16 | End: 2022-01-26

## 2019-10-16 RX ORDER — SIMETHICONE 80 MG
TABLET,CHEWABLE ORAL
Qty: 1 TABLET | Refills: 0 | Status: SHIPPED | OUTPATIENT
Start: 2019-10-16 | End: 2020-01-29

## 2019-10-16 RX ADMIN — MEDROXYPROGESTERONE ACETATE 150 MG: 150 INJECTION, SUSPENSION INTRAMUSCULAR at 15:25

## 2019-10-16 ASSESSMENT — MIFFLIN-ST. JEOR: SCORE: 1401.81

## 2019-10-16 NOTE — NURSING NOTE
Clinic Administered Medication Documentation    MEDICATION LIST:   Depo Provera Documentation    Prior to injection, verified patient identity using patient's name and date of birth. Medication was administered. Please see MAR and medication order for additional information. Patient instructed to remain in clinic for 15 minutes.    BP: 131/82    LAST PAP/EXAM: No results found for: PAP  URINE HCG:not indicated    NEXT INJECTION DUE: 1/1/20 - 1/15/20    Was entire vial of medication used? Yes  Vial/Syringe: Single dose vial  Expiration Date:  12/30/20      Name of provider who requested the medication administration: Aly  Name of provider on site (faculty or community preceptor) at the time of performing the medication administration: Dr. Rowe    Date of next administration: 1/2/20 to 1/22/20  Date of next office visit with provider to renew medication plan (must be seen annually): 10/16/20

## 2019-10-16 NOTE — PROGRESS NOTES
"       HPI:   Bharath Forbes is a 49 year old  female who presents for:    Chief Complaint   Patient presents with     Contraception     renew depo     Medication Reconciliation     complete       menarche at 13 year old. Periods were regular with no metro or menorrhagia. Depo started years ago approximately 2002. Has had good success with depo in the past. She only has vaginal bleeding the couple days before she gets her next depo injection.  She thinks she has started to go into menopause with hot flashes \"every night\" over the last year. Does not recall when her mother went into menopause. Does not want to become pregnant.         PMHX:     Patient Active Problem List   Diagnosis     HTN (hypertension)     Encounter for surveillance of injectable contraceptive       Current Outpatient Medications   Medication Sig Dispense Refill     amLODIPine (NORVASC) 10 MG tablet Take 1 tablet (10 mg) by mouth daily 90 tablet 1     citalopram (CELEXA) 10 MG tablet Take 1 tablet (10 mg) by mouth daily 30 tablet 0     hydrochlorothiazide (HYDRODIURIL) 25 MG tablet Take 1 tablet (25 mg) by mouth daily 90 tablet 1     medroxyPROGESTERone (DEPO-PROVERA) 150 MG/ML injection Inject 1 mL (150 mg) into the muscle every 3 months 1 mL 0     medroxyPROGESTERone (DEPO-PROVERA) 150 MG/ML injection Inject 1 mL (150 mg) into the muscle every 3 months for 1 day 1 mL 0       Social History     Tobacco Use     Smoking status: Former Smoker     Packs/day: 0.25     Years: 1.00     Pack years: 0.25     Types: Cigarettes     Smokeless tobacco: Former User     Quit date: 2/26/2010   Substance Use Topics     Alcohol use: Yes     Alcohol/week: 0.0 standard drinks     Comment: Ocassionally     Drug use: Yes     Types: Marijuana       Social History     Patient does not qualify to have social determinant information on file (likely too young).   Social History Narrative     Not on file       Allergies   Allergen Reactions     Nka [No Known Allergies] " "       No results found for this or any previous visit (from the past 24 hour(s)).         Review of Systems:    ROS: 10 point ROS neg other than the symptoms noted above in the HPI.         Physical Exam:     Vitals:    10/16/19 1519   BP: 131/82   Pulse: 71   Resp: 16   Temp: 98.1  F (36.7  C)   TempSrc: Oral   SpO2: 98%   Weight: 74.8 kg (165 lb)   Height: 1.703 m (5' 7.05\")     Body mass index is 25.81 kg/m .    General: Alert, well-appearing female in NAD  HEENT: PERRL, moist oral mucus membranes  Pulm: CTA BL, no tachypnea  CV: RRR, no murmur  Abd: soft, NTND, no masses  Psych: Mood appropriate to visit content, full affect, rational thought content and process    Assessment and Plan   (Z12.11) Special screening for malignant neoplasms, colon  (primary encounter diagnosis)  Comment: Screening as now at age 49  Plan: GASTROENTEROLOGY ADULT REF PROCEDURE ONLY None,        bisacodyl (DULCOLAX) 5 MG EC tablet,         Polyethylene Glycol POWD, simethicone (MYLICON)        80 MG chewable tablet            (Z12.31) Encounter for screening mammogram for breast cancer  Comment: Due for screening as now at age 49.   Plan: MA SCREENING DIGITAL BILAT            (Z30.42) Encounter for surveillance of injectable contraceptive  Comment: Recertified for depo for another year. May not need it soon as seems to be in menopause for the last year or so. Discussed need to keep HTN undercontrol whil on depo and patient states she has been much beeter about her meds amlodipine and hydrochlorothiazide. BP normal today on meds.  Plan: medroxyPROGESTERone (DEPO-PROVERA) injection         150 mg            Follow up: yearly physical  Options for treatment and follow-up care were reviewed with the patient and/or guardian. Bharath Forbes and/or guardian engaged in the decision making process and verbalized understanding of the options discussed and agreed with the final plan.    Dr. Tomi Nugent, PGY3    Precepted with: Lyn" MD Soledad

## 2019-10-17 NOTE — PATIENT INSTRUCTIONS
Referral for :     Colonoscopy    LOCATION/PLACE/Provider :    [facility]   DATE & TIME :    Dec. 12th at 10:45 am   PHONE :     [phone]  FAX :    [phone]  Appointment instructions: Follow prep instructions   Appointment made by clinic staff/:    Sonia     Referral for :     Mammogram    LOCATION/PLACE/Provider :    [facility]  DATE & TIME :    Dec. 11th at 10:30 am   PHONE :     [phone]  FAX :    [phone]  Appointment instructions: No deodorants, perfumes or scented lotions  Appointment made by clinic staff/:    Sonia

## 2019-10-23 NOTE — PROGRESS NOTES
Preceptor Attestation:  Patient's case reviewed and discussed with Tomi Nugent MD resident and I evaluated the patient. I agree with written assessment and plan of care.  Supervising Physician:  JESICA PETERSON MD  [facility]

## 2019-10-24 ENCOUNTER — RECORDS - HEALTHEAST (OUTPATIENT)
Dept: ADMINISTRATIVE | Facility: OTHER | Age: 49
End: 2019-10-24

## 2020-01-27 DIAGNOSIS — I10 BENIGN ESSENTIAL HYPERTENSION: ICD-10-CM

## 2020-01-27 RX ORDER — AMLODIPINE BESYLATE 10 MG/1
10 TABLET ORAL DAILY
Qty: 90 TABLET | Refills: 3 | Status: SHIPPED | OUTPATIENT
Start: 2020-01-27 | End: 2021-04-05

## 2020-01-29 ENCOUNTER — OFFICE VISIT (OUTPATIENT)
Dept: FAMILY MEDICINE | Facility: CLINIC | Age: 50
End: 2020-01-29
Payer: COMMERCIAL

## 2020-01-29 VITALS
TEMPERATURE: 98.1 F | OXYGEN SATURATION: 100 % | HEIGHT: 66 IN | DIASTOLIC BLOOD PRESSURE: 84 MMHG | HEART RATE: 90 BPM | SYSTOLIC BLOOD PRESSURE: 138 MMHG | WEIGHT: 160.8 LBS | RESPIRATION RATE: 18 BRPM | BODY MASS INDEX: 25.84 KG/M2

## 2020-01-29 DIAGNOSIS — I10 BENIGN ESSENTIAL HYPERTENSION: Primary | ICD-10-CM

## 2020-01-29 DIAGNOSIS — Z30.42 ENCOUNTER FOR MANAGEMENT AND INJECTION OF DEPO-PROVERA: ICD-10-CM

## 2020-01-29 LAB — HCG UR QL: NEGATIVE

## 2020-01-29 RX ORDER — HYDROCHLOROTHIAZIDE 25 MG/1
25 TABLET ORAL DAILY
Qty: 90 TABLET | Refills: 3 | Status: SHIPPED | OUTPATIENT
Start: 2020-01-29 | End: 2021-07-19

## 2020-01-29 RX ADMIN — MEDROXYPROGESTERONE ACETATE 150 MG: 150 INJECTION, SUSPENSION INTRAMUSCULAR at 10:26

## 2020-01-29 ASSESSMENT — MIFFLIN-ST. JEOR: SCORE: 1366.13

## 2020-01-29 NOTE — PATIENT INSTRUCTIONS
Referral for :     Colonoscopy    LOCATION/PLACE/Provider :    [facility] with Dr. Renae   DATE & TIME :    Feb. 6th at 3pm   PHONE :     [phone]  FAX :    [phone]  Appointment made by clinic staff/:    Sonia

## 2020-01-29 NOTE — PROGRESS NOTES
HPI:       Bharath Forbes is a 49 year old who presents today for follow up of hypertension and contraception.       1.  Hypertension: Patient notes no problems with current medications. Patient is currently taking her medications as listed below.  Patient is not checking blood pressures regularly outside of the clinic.  Patient is not following a low salt diet. she denies chest pain, lightheadedness, syncope, rashes/swelling/urticaria, cough, shortness of breath, headache, nausea, or urinary frequency.       Medications: amlodipine 10 mg daily, hydrochlorothiazide 25 mg daily     Smoking: No    Statin: No, last LDL 77 10/2018    2. Depo provera: Last injection in clinic 10/16/2019. Menarche at 13 years old. Periods were regular. Was on depo provera in the past (around 2002) without issues. Reports only having vaginal bleeding for a few days before getting her next depo shot at the end of her 3 months. Does not recall when her mother went into menopause. Has been having some hot flashes at night throughout the last year.     BP Readings from Last 3 Encounters:   01/29/20 138/84   10/16/19 131/82   09/24/19 (!) 171/99     Wt Readings from Last 3 Encounters:   01/29/20 72.9 kg (160 lb 12.8 oz)   10/16/19 74.8 kg (165 lb)   09/24/19 74.2 kg (163 lb 9.6 oz)     The 10-year ASCVD risk score (Tashia BARTON Jr., et al., 2013) is: 3.4%    Values used to calculate the score:      Age: 49 years      Sex: Female      Is Non- : Yes      Diabetic: No      Tobacco smoker: No      Systolic Blood Pressure: 138 mmHg      Is BP treated: Yes      HDL Cholesterol: 58 mg/dL      Total Cholesterol: 152 mg/dL         PMHX:     Patient Active Problem List   Diagnosis     Essential hypertension     Encounter for surveillance of injectable contraceptive     Current Outpatient Medications   Medication Sig Dispense Refill     amLODIPine (NORVASC) 10 MG tablet Take 1 tablet (10 mg) by mouth daily 90 tablet 3      "hydrochlorothiazide (HYDRODIURIL) 25 MG tablet Take 1 tablet (25 mg) by mouth daily 90 tablet 3     Social History     Tobacco Use     Smoking status: Former Smoker     Packs/day: 0.25     Years: 1.00     Pack years: 0.25     Types: Cigarettes     Smokeless tobacco: Former User     Quit date: 2/26/2010   Substance Use Topics     Alcohol use: Yes     Alcohol/week: 0.0 standard drinks     Comment: Ocassionally     Drug use: Yes     Types: Marijuana     Allergies   Allergen Reactions     Nka [No Known Allergies]           Review of Systems:     C: NEGATIVE for fatigue, unexpected change in weight  E: NEGATIVE for acute vision problems or changes  R: NEGATIVE for significant cough or shortness of breath  CV: NEGATIVE for chest pain, palpitations or new or worsening peripheral edema  P: NEGATIVE for changes in mood or affect          Physical Exam:     Vitals: /84   Pulse 90   Temp 98.1  F (36.7  C) (Oral)   Resp 18   Ht 1.676 m (5' 6\")   Wt 72.9 kg (160 lb 12.8 oz)   SpO2 100%   BMI 25.95 kg/m    BMIE= Body mass index is 25.95 kg/m .  GENERAL APPEARANCE: alert and no acute distress  PSYCH: mentation appears normal and affect normal/bright  RESP: lungs clear to auscultation - no rales, rhonchi or wheezes  CV: regular rate and rhythm, normal S1 S2, no S3 or S4 and no murmur, click or rub -  ABDOMEN:  soft, nontender, no HSM or masses and bowel sounds normal. No abdominal bruits.  EXT: no cyanosis or edema in lower extremities  SKIN: no venous stasis changes  NEURO: Normal strength and tone, sensory exam grossly normal, mentation intact and speech normal    Assessment and Plan     Bharath Forbes is a 49 year old female here for evaluation of:     1. Benign essential hypertension  Blood pressure within goal. Emphasized diet and exercise. Medication changes today: None. Refill sent for hydrochlorothiazide, Dr. Colvin already refilled amlodipine this week. Follow up 3 months.  - hydrochlorothiazide " (HYDRODIURIL) 25 MG tablet; Take 1 tablet (25 mg) by mouth daily  Dispense: 90 tablet; Refill: 3    2. Encounter for management and injection of depo-Provera  UPT negative. Depo given; would suspect that she would not need for long term given menopause symptoms over the past year. Reiterated need to keep HTN under control while on depo.  - HCG Qualitative Urine (UPT)  ([facility])    Options for treatment and follow-up care were reviewed with the patient and/or guardian. Bharath Forbes and/or guardian engaged in the decision making process and verbalized understanding of the options discussed and agreed with the final plan.    Shelly New MD  [facility] Family Medicine Resident  P: [phone]    Precepted today with: Kaiden Santiago MD

## 2020-01-29 NOTE — NURSING NOTE
Clinic Administered Medication Documentation    MEDICATION LIST:   Depo Provera Documentation    Prior to injection, verified patient identity using patient's name and date of birth. Medication was administered. Please see MAR and medication order for additional information. Patient instructed to remain in clinic for 15 minutes.    BP: 138/84    LAST PAP/EXAM: No results found for: PAP  URINE HCG:negative    NEXT INJECTION DUE: 4/16/20 to 5/7/20    Was entire vial of medication used? Yes  Vial/Syringe: Single dose vial  Expiration Date:  1/21      Name of provider who requested the medication administration: Dr. Grant Menendez  Name of provider on site (faculty or community preceptor) at the time of performing the medication administration: Dr. Santiago    Date of next administration: 4/16/20 to 5/7/20  Date of next office visit with provider to renew medication plan (must be seen annually): 10/16/20

## 2020-02-04 ENCOUNTER — TELEPHONE (OUTPATIENT)
Dept: FAMILY MEDICINE | Facility: CLINIC | Age: 50
End: 2020-02-04

## 2020-02-04 ASSESSMENT — MIFFLIN-ST. JEOR: SCORE: 1368.39

## 2020-02-04 NOTE — TELEPHONE ENCOUNTER
----- Message from Sonia Busch sent at 2/4/2020  9:31 AM CST -----  Regarding: colonoscopy  Patient is scheduled for her colonoscopy for Feb. 6th at 3pm with Dr. Renae.

## 2020-02-04 NOTE — TELEPHONE ENCOUNTER
Called pt to remind her of appointment for colonoscopy on Thursday, 2/6/2020 at 3PM with an arrival time of 2PM. Pt stated she spoke with the [facility] and they went of the prep instruction so she did not have any questions for me at this time. I let pt know that if she comes up with any questions that she can reach me at the clinic between the hours of 8:30am and 5:00pm.

## 2020-02-05 NOTE — PROGRESS NOTES
Preceptor Attestation:  Patient's case reviewed and discussed with Shelly New MD resident and I evaluated the patient. I agree with written assessment and plan of care.  Supervising Physician:  Kaiden Santiago MD, MD [place]  [facility]

## 2020-02-06 ENCOUNTER — SURGERY - HEALTHEAST (OUTPATIENT)
Dept: SURGERY | Facility: AMBULATORY SURGERY CENTER | Age: 50
End: 2020-02-06

## 2020-02-06 ASSESSMENT — MIFFLIN-ST. JEOR: SCORE: 1368.39

## 2020-04-24 ENCOUNTER — ALLIED HEALTH/NURSE VISIT (OUTPATIENT)
Dept: FAMILY MEDICINE | Facility: CLINIC | Age: 50
End: 2020-04-24
Payer: COMMERCIAL

## 2020-04-24 VITALS
RESPIRATION RATE: 18 BRPM | HEART RATE: 78 BPM | TEMPERATURE: 99.2 F | DIASTOLIC BLOOD PRESSURE: 80 MMHG | SYSTOLIC BLOOD PRESSURE: 136 MMHG | BODY MASS INDEX: 25.84 KG/M2 | WEIGHT: 160.8 LBS | OXYGEN SATURATION: 98 % | HEIGHT: 66 IN

## 2020-04-24 DIAGNOSIS — Z30.42 ENCOUNTER FOR SURVEILLANCE OF INJECTABLE CONTRACEPTIVE: Primary | ICD-10-CM

## 2020-04-24 RX ADMIN — MEDROXYPROGESTERONE ACETATE 150 MG: 150 INJECTION, SUSPENSION INTRAMUSCULAR at 10:30

## 2020-04-24 ASSESSMENT — MIFFLIN-ST. JEOR: SCORE: 1361.13

## 2020-04-24 NOTE — NURSING NOTE
Clinic Administered Medication Documentation      Depo Provera Documentation    URINE HCG: not indicated    Depo-Provera Standing Order inclusion/exclusion criteria reviewed.   Patient meets: inclusion criteria     BP: 136/80  LAST PAP/EXAM: 2/2015 and due for PAP next time    Prior to injection, verified patient identity using patient's name and date of birth. Medication was administered. Please see MAR and medication order for additional information.     Was entire vial of medication used? Yes  Vial/Syringe: Single dose vial  Expiration Date:  10/21    Patient instructed to stay in clinic after the injection but patient declined.    Name of provider who requested the medication administration: Liseth  Name of provider on site (faculty or community preceptor) at the time of performing the medication administration: Reji    Date of next administration: 7/10/20 - 7/24/20      Date of next office visit with provider to renew medication plan (must be seen annually): 10/16/20

## 2020-07-17 ENCOUNTER — ALLIED HEALTH/NURSE VISIT (OUTPATIENT)
Dept: FAMILY MEDICINE | Facility: CLINIC | Age: 50
End: 2020-07-17
Payer: COMMERCIAL

## 2020-07-17 DIAGNOSIS — Z30.42 ENCOUNTER FOR SURVEILLANCE OF INJECTABLE CONTRACEPTIVE: Primary | ICD-10-CM

## 2020-08-19 NOTE — NURSING NOTE
Clinic Administered Medication Documentation        Depo Provera Documentation     URINE HCG: not indicated     Depo-Provera Standing Order inclusion/exclusion criteria reviewed.   Patient meets: inclusion criteria      BP: Data Unavailable  LAST PAP/EXAM: No results found for: PAP     Prior to injection, verified patient identity using patient's name and date of birth. Medication was administered. Please see MAR and medication order for additional information.      Was entire vial of medication used? Yes  Vial/Syringe: Single dose vial  Expiration Date:  01/2021     Patient instructed to remain in clinic for 15 minutes.  NEXT INJECTION DUE: 10/2/20 - 10/16/20        Name of provider who requested the medication administration:   Name of provider on site (faculty or community preceptor) at the time of performing the medication administration:      Date of next administration: 10/02/20  Date of next office visit with provider to renew medication plan (must be seen annually): 10/16/20

## 2020-10-02 ENCOUNTER — ALLIED HEALTH/NURSE VISIT (OUTPATIENT)
Dept: FAMILY MEDICINE | Facility: CLINIC | Age: 50
End: 2020-10-02
Payer: COMMERCIAL

## 2020-10-02 DIAGNOSIS — Z30.42 ENCOUNTER FOR SURVEILLANCE OF INJECTABLE CONTRACEPTIVE: Primary | ICD-10-CM

## 2020-10-02 PROCEDURE — 96372 THER/PROPH/DIAG INJ SC/IM: CPT

## 2020-10-02 RX ADMIN — MEDROXYPROGESTERONE ACETATE 150 MG: 150 INJECTION, SUSPENSION INTRAMUSCULAR at 11:22

## 2020-10-02 NOTE — NURSING NOTE
Clinic Administered Medication Documentation      Depo Provera Documentation    URINE HCG: not indicated    Depo-Provera Standing Order inclusion/exclusion criteria reviewed.   Patient meets: inclusion criteria     BP: Data Unavailable  LAST PAP/EXAM: No results found for: PAP    Prior to injection, verified patient identity using patient's name and date of birth. Medication was administered. Please see MAR and medication order for additional information.     Was entire vial of medication used? Yes  Vial/Syringe: Single dose vial  Expiration Date:  1/30/21    Patient instructed to remain in clinic for 15 minutes.  NEXT INJECTION DUE: 12/18/20 - 1/1/21      Name of provider who requested the medication administration: Tomi Childers  Name of provider on site (faculty or community preceptor) at the time of performing the medication administration: Dr Mendoza    Date of next administration: 12/18/20 to 1/1/21  Date of next office visit with provider to renew medication plan (must be seen annually): 10/16/20 - pt informed next has to be with provider

## 2021-02-26 ENCOUNTER — OFFICE VISIT (OUTPATIENT)
Dept: FAMILY MEDICINE | Facility: CLINIC | Age: 51
End: 2021-02-26
Payer: COMMERCIAL

## 2021-02-26 VITALS
HEART RATE: 98 BPM | WEIGHT: 165 LBS | OXYGEN SATURATION: 99 % | RESPIRATION RATE: 16 BRPM | TEMPERATURE: 98.5 F | BODY MASS INDEX: 25.9 KG/M2 | SYSTOLIC BLOOD PRESSURE: 162 MMHG | HEIGHT: 67 IN | DIASTOLIC BLOOD PRESSURE: 85 MMHG

## 2021-02-26 DIAGNOSIS — Z30.42 ENCOUNTER FOR SURVEILLANCE OF INJECTABLE CONTRACEPTIVE: Primary | ICD-10-CM

## 2021-02-26 DIAGNOSIS — R61 NIGHT SWEATS: ICD-10-CM

## 2021-02-26 DIAGNOSIS — I10 ESSENTIAL HYPERTENSION: ICD-10-CM

## 2021-02-26 DIAGNOSIS — R31.9 HEMATURIA, UNSPECIFIED TYPE: ICD-10-CM

## 2021-02-26 LAB
BACTERIA: ABNORMAL
BILIRUBIN UR: ABNORMAL MG/DL
BLOOD UR: ABNORMAL MG/DL
CASTS: ABNORMAL /LPF
CLARITY, URINE: CLEAR
COLOR UR: ABNORMAL
CRYSTAL URINE: ABNORMAL /LPF
EPITHELIAL CELLS UR: ABNORMAL /LPF (ref 0–2)
GLUCOSE URINE: NEGATIVE
HCG UR QL: NEGATIVE
KETONES UR QL: NEGATIVE MG/DL
LEUKOCYTE ESTERASE UR: ABNORMAL
MUCOUS URINE: ABNORMAL LPF
NITRITE UR QL STRIP: NEGATIVE MG/DL
PH UR STRIP: 5.5 [PH] (ref 4.5–8)
PROTEIN UR: ABNORMAL MG/DL
RBC URINE: <2 /HPF
SP GR UR STRIP: >=1.03 (ref 1–1.03)
UROBILINOGEN UR STRIP-ACNC: ABNORMAL E.U./DL
WBC URINE: ABNORMAL /HPF

## 2021-02-26 PROCEDURE — 99214 OFFICE O/P EST MOD 30 MIN: CPT | Mod: GC | Performed by: STUDENT IN AN ORGANIZED HEALTH CARE EDUCATION/TRAINING PROGRAM

## 2021-02-26 PROCEDURE — 81025 URINE PREGNANCY TEST: CPT | Performed by: STUDENT IN AN ORGANIZED HEALTH CARE EDUCATION/TRAINING PROGRAM

## 2021-02-26 PROCEDURE — 81001 URINALYSIS AUTO W/SCOPE: CPT | Performed by: STUDENT IN AN ORGANIZED HEALTH CARE EDUCATION/TRAINING PROGRAM

## 2021-02-26 ASSESSMENT — MIFFLIN-ST. JEOR: SCORE: 1399.94

## 2021-02-26 NOTE — LETTER
March 1, 2021      Bharath Forbes  [address] [place] [address]        Dear Ms.Andrei,    We are writing to inform you of your test results.    Hi Bharath,   Your urine did not show evidence of blood when we looked at it under a microscope but it did show evidence of possible kidney stones with calcium oxalate crystals showing up in the urine. This typically is something just to watch over time however if you ever develop severe abdominal pain in the future this could be a sign of a kidney stone and you should go into the ED if you ever develop that. To help prevent the recurrence of kidney stones make sure you drink plenty of water, increase your fruit and veggie content, decrease salt in your diet and lastly I would avoid foods high in oxalate (which include spinach, potatoes, rhubarb, peanuts, almonds). We can discuss this further at our next visit as well.     Let me know if there are any questions.          Resulted Orders   HCG Qualitative Urine (UPT)  ([facility])   Result Value Ref Range    HCG Qual Urine Negative Negative   Urinalysis, Micro If ([facility])   Result Value Ref Range    Specific Gravity Urine >=1.030 1.005 - 1.030    pH Urine 5.5 4.5 - 8.0    Leukocyte Esterase UR 1+ (A) NEGATIVE    Nitrite Urine Negative NEGATIVE mg/dL    Protein UR Trace (A) NEGATIVE mg/dL    Glucose Urine Negative NEGATIVE    Ketones Urine Negative NEGATIVE mg/dL    Urobilinogen mg/dL 1.0 E.U./dL (A) 0.2 E.U./dL E.U./dL    Bilirubin UR 1+ (A) NEGATIVE mg/dL    Blood UR 2+ (A) NEGATIVE mg/dL    Color Urine Dark yellow     Clarity, urine Clear    Urine Microscopic ([facility])   Result Value Ref Range    WBC Urine 2-5 <5 /hpf    RBC Urine <2 <5 /hpf    Epithelial Cells UR 2-5 0 - 2 /lpf    Mucous Urine None NONE lpf    Casts Urine None NONE /lpf    Crystal Urine calcium oxalate (A) NONE /lpf    Bacteria Wet Prep Few None       If you have any questions or concerns, please call the clinic at the number listed above.        Sincerely,    Thanks,   Charles Francis MD

## 2021-02-26 NOTE — PROGRESS NOTES
CHIEF COMPLAINT                                                      Chief Complaint   Patient presents with     Imm/Inj     Contraception     ANNUAL RENEWAL FOR DEPO      Medication Reconciliation     COMPLETED      SUBJECTIVE:                                                    Bharath Forbes is a 50 year old year old female who presents to clinic today for the following health issues:    Contraception follow up:  -No hot flashes   -Night sweats for the past 2 years, every night, sweat soaks the beds  -LMP years ago due to depo  -wants to get one last injection then be done with depo  -no unintended weight loss    HTN follow up:  -doesn't check BP at home  -states she takes medication daily, did not take this AM  -Denies chest pain, shortness of breath, lightheadedness or dizziness    Patient is an established patient of this clinic.    ----------------------------------------------------------------------------------------------------------------------  Patient Active Problem List   Diagnosis     Essential hypertension     Encounter for surveillance of injectable contraceptive     Past Surgical History:   Procedure Laterality Date     NO HISTORY OF SURGERY         Social History     Tobacco Use     Smoking status: Former Smoker     Packs/day: 0.25     Years: 1.00     Pack years: 0.25     Types: Cigarettes     Smokeless tobacco: Former User     Quit date: 2/26/2010   Substance Use Topics     Alcohol use: Yes     Alcohol/week: 0.0 standard drinks     Comment: Ocassionally     Family History   Problem Relation Age of Onset     Hypertension Mother      Diabetes No family hx of      Coronary Artery Disease No family hx of      Breast Cancer No family hx of      Cancer - colorectal No family hx of      Ovarian Cancer No family hx of      Prostate Cancer No family hx of      Other Cancer No family hx of      Cerebrovascular Disease No family hx of      Thyroid Disease No family hx of      Asthma No family hx of      "     Problem list and past medical, surgical, social, and family histories reviewed & adjusted, as indicated.    Current Outpatient Medications   Medication Sig Dispense Refill     amLODIPine (NORVASC) 10 MG tablet Take 1 tablet (10 mg) by mouth daily 90 tablet 3     hydrochlorothiazide (HYDRODIURIL) 25 MG tablet Take 1 tablet (25 mg) by mouth daily 90 tablet 3     Medication list reviewed and updated as indicated.    Allergies   Allergen Reactions     Nka [No Known Allergies]      Allergies reviewed and updated as indicated.  ----------------------------------------------------------------------------------------------------------------------  ROS:     ROS: 10 point ROS neg other than the symptoms noted above in the HPI.    OBJECTIVE:     BP (!) 162/85   Pulse 98   Temp 98.5  F (36.9  C) (Oral)   Resp 16   Ht 1.708 m (5' 7.24\")   Wt 74.8 kg (165 lb)   SpO2 99%   BMI 25.66 kg/m    Body mass index is 25.66 kg/m .  GENERAL: Appears well and in no acute distress.  CARDIOVASCULAR: Regular rate and rhythm, normal S1 and S2 without murmur. No extra heartsounds or friction rub.   RESPIRATORY: Lungs clear to auscultation bilaterally. No wheezing or crackles. No prolonged expiration. Symmetrical chest rise.  MSK: No gross extremity deformities. No peripheral edema. Normal muscle bulk.    Diagnostic Test Results:  Results for orders placed or performed in visit on 02/26/21   HCG Qualitative Urine (UPT)  ([facility])     Status: None   Result Value Ref Range    HCG Qual Urine Negative Negative   Urinalysis, Micro If ([facility])     Status: Abnormal   Result Value Ref Range    Specific Gravity Urine >=1.030 1.005 - 1.030    pH Urine 5.5 4.5 - 8.0    Leukocyte Esterase UR 1+ (A) NEGATIVE    Nitrite Urine Negative NEGATIVE mg/dL    Protein UR Trace (A) NEGATIVE mg/dL    Glucose Urine Negative NEGATIVE    Ketones Urine Negative NEGATIVE mg/dL    Urobilinogen mg/dL 1.0 E.U./dL (A) 0.2 E.U./dL E.U./dL    Bilirubin UR 1+ (A) NEGATIVE " mg/dL    Blood UR 2+ (A) NEGATIVE mg/dL    Color Urine Dark yellow     Clarity, urine Clear    Urine Microscopic ([facility])     Status: Abnormal   Result Value Ref Range    WBC Urine 2-5 <5 /hpf    RBC Urine <2 <5 /hpf    Epithelial Cells UR 2-5 0 - 2 /lpf    Mucous Urine None NONE lpf    Casts Urine None NONE /lpf    Crystal Urine calcium oxalate (A) NONE /lpf    Bacteria Wet Prep Few None       ASSESSMENT/PLAN:     1. Encounter for surveillance of injectable contraceptive  Night sweats  Patient wanting to restart depo today. Patient is late so UPT obtained at this was negative. Patient too hypertensive today to restart depo or combined OCP. Patient reports 2 year history of drenching night sweats and suspect 2/2 menopause but could be B type symptoms so will need to monitor for resolution as has been ongoing for 2 years now. Given the significant symptom burden patient would benefit from a combined OCP and patient would like to start this instead of DEPO. Will have patient follow up in 1 week to make sure HTN under control with medications prior to starting OCP. Needs PAP at that visit as well. If night sweats do not resolve with combined OCP would purse further workup for b cell symptoms.   - HCG Qualitative Urine (UPT)  ([facility])    2. Hematuria, unspecified type  +blood noted on dipstick for UPT. Micro obtained and did not show hematuria but did show calcium oxalate crystals. No history of nephrolithiasis. Guidance given for diet modification to prevent calcium oxalate stones. Has prior history of hematuria noted on prior UAs and suspect related to nephrolithiasis.   - Urinalysis, Micro If ([facility])  - Urine Microscopic ([facility])    3. Essential hypertension  BP uncontrolled today and patient did not take medications. Will have patient follow up in 1 week and recheck BP after she takes meds.       Schedule follow-up appointment in 1 week(s).      There are no discontinued medications.    Charles Francis MD  [facility]  Family Medicine Resident  Pager# [phone]    Precepted with: Hector Renae III, MD    Options for treatment and follow-up care were reviewed with the patient and/or guardian. Bharath Forbes and/or guardian engaged in the decision making process and verbalized understanding of the options discussed and agreed with the final plan

## 2021-02-26 NOTE — LETTER
March 1, 2021      Bharath Forbes  [address] [place] [address]        Dear Ms.Andrei,    We are writing to inform you of your test results.      Hi Bharath,   Your urine did not show evidence of blood when we looked at it under a microscope but it did show evidence of possible kidney stones with calcium oxalate crystals showing up in the urine. This typically is something just to watch over time however if you ever develop severe abdominal pain in the future this could be a sign of a kidney stone and you should go into the ED if you ever develop that. To help prevent the recurrence of kidney stones make sure you drink plenty of water, increase your fruit and veggie content, decrease salt in your diet and lastly I would avoid foods high in oxalate (which include spinach, potatoes, rhubarb, peanuts, almonds). We can discuss this further at our next visit as well.     Let me know if there are any questions.     Resulted Orders   HCG Qualitative Urine (UPT)  ([facility])   Result Value Ref Range    HCG Qual Urine Negative Negative   Urinalysis, Micro If ([facility])   Result Value Ref Range    Specific Gravity Urine >=1.030 1.005 - 1.030    pH Urine 5.5 4.5 - 8.0    Leukocyte Esterase UR 1+ (A) NEGATIVE    Nitrite Urine Negative NEGATIVE mg/dL    Protein UR Trace (A) NEGATIVE mg/dL    Glucose Urine Negative NEGATIVE    Ketones Urine Negative NEGATIVE mg/dL    Urobilinogen mg/dL 1.0 E.U./dL (A) 0.2 E.U./dL E.U./dL    Bilirubin UR 1+ (A) NEGATIVE mg/dL    Blood UR 2+ (A) NEGATIVE mg/dL    Color Urine Dark yellow     Clarity, urine Clear    Urine Microscopic ([facility])   Result Value Ref Range    WBC Urine 2-5 <5 /hpf    RBC Urine <2 <5 /hpf    Epithelial Cells UR 2-5 0 - 2 /lpf    Mucous Urine None NONE lpf    Casts Urine None NONE /lpf    Crystal Urine calcium oxalate (A) NONE /lpf    Bacteria Wet Prep Few None       If you have any questions or concerns, please call the clinic at the number listed above.        Sincerely,      Hector Renae MD

## 2021-03-01 NOTE — RESULT ENCOUNTER NOTE
Please call Bharath with the following result:    Hi Bharath,  Your urine did not show evidence of blood when we looked at it under a microscope but it did show evidence of possible kidney stones with calcium oxalate crystals showing up in the urine. This typically is something just to watch over time however if you ever develop severe abdominal pain in the future this could be a sign of a kidney stone and you should go into the ED if you ever develop that. To help prevent the recurrence of kidney stones make sure you drink plenty of water, increase your fruit and veggie content, decrease salt in your diet and lastly I would avoid foods high in oxalate (which include spinach, potatoes, rhubarb, peanuts, almonds). We can discuss this further at our next visit as well.     Let me know if there are any questions.     Thanks,  Charles Francis MD

## 2021-03-04 ENCOUNTER — OFFICE VISIT (OUTPATIENT)
Dept: FAMILY MEDICINE | Facility: CLINIC | Age: 51
End: 2021-03-04
Payer: COMMERCIAL

## 2021-03-04 VITALS
BODY MASS INDEX: 25.55 KG/M2 | HEART RATE: 94 BPM | SYSTOLIC BLOOD PRESSURE: 154 MMHG | HEIGHT: 67 IN | RESPIRATION RATE: 22 BRPM | OXYGEN SATURATION: 100 % | WEIGHT: 162.8 LBS | DIASTOLIC BLOOD PRESSURE: 98 MMHG | TEMPERATURE: 99.1 F

## 2021-03-04 DIAGNOSIS — Z00.00 ROUTINE GENERAL MEDICAL EXAMINATION AT A HEALTH CARE FACILITY: Primary | ICD-10-CM

## 2021-03-04 DIAGNOSIS — F41.1 GENERALIZED ANXIETY DISORDER: ICD-10-CM

## 2021-03-04 DIAGNOSIS — I10 BENIGN ESSENTIAL HYPERTENSION: ICD-10-CM

## 2021-03-04 DIAGNOSIS — Z30.42 ENCOUNTER FOR SURVEILLANCE OF INJECTABLE CONTRACEPTIVE: ICD-10-CM

## 2021-03-04 DIAGNOSIS — R31.9 HEMATURIA, UNSPECIFIED TYPE: ICD-10-CM

## 2021-03-04 LAB
BACTERIA: NORMAL
BILIRUBIN UR: NEGATIVE MG/DL
BLOOD UR: ABNORMAL MG/DL
BUN SERPL-MCNC: 8 MG/DL (ref 7–30)
CALCIUM SERPL-MCNC: 10 MG/DL (ref 8.5–10.4)
CASTS: NORMAL /LPF
CHLORIDE SERPLBLD-SCNC: 100 MMOL/L (ref 94–109)
CLARITY, URINE: CLEAR
CO2 SERPL-SCNC: 30 MMOL/L (ref 20–32)
COLOR UR: YELLOW
CREAT SERPL-MCNC: 0.8 MG/DL (ref 0.6–1.3)
CRYSTAL URINE: NORMAL /LPF
EGFR CALCULATED (BLACK REFERENCE): >90 ML/MIN
EGFR CALCULATED (NON BLACK REFERENCE): 80.4 ML/MIN
EPITHELIAL CELLS UR: NORMAL /LPF (ref 0–2)
GLUCOSE SERPL-MCNC: 89 MG/DL (ref 60–109)
GLUCOSE URINE: NEGATIVE
HIV 1+2 AB+HIV1 P24 AG SERPL QL IA: NEGATIVE
KETONES UR QL: NEGATIVE MG/DL
LEUKOCYTE ESTERASE UR: ABNORMAL
MUCOUS URINE: NORMAL LPF
NITRITE UR QL STRIP: NEGATIVE MG/DL
PH UR STRIP: 7 [PH] (ref 4.5–8)
POTASSIUM SERPL-SCNC: 3.2 MMOL/L (ref 3.4–5.3)
PROTEIN UR: NEGATIVE MG/DL
RBC URINE: NORMAL /HPF
SODIUM SERPL-SCNC: 141 MMOL/L (ref 133–144)
SP GR UR STRIP: 1.02 (ref 1–1.03)
UROBILINOGEN UR STRIP-ACNC: ABNORMAL E.U./DL
WBC URINE: NORMAL /HPF

## 2021-03-04 PROCEDURE — 36415 COLL VENOUS BLD VENIPUNCTURE: CPT | Performed by: STUDENT IN AN ORGANIZED HEALTH CARE EDUCATION/TRAINING PROGRAM

## 2021-03-04 PROCEDURE — 96372 THER/PROPH/DIAG INJ SC/IM: CPT | Performed by: STUDENT IN AN ORGANIZED HEALTH CARE EDUCATION/TRAINING PROGRAM

## 2021-03-04 PROCEDURE — 80048 BASIC METABOLIC PNL TOTAL CA: CPT | Performed by: STUDENT IN AN ORGANIZED HEALTH CARE EDUCATION/TRAINING PROGRAM

## 2021-03-04 PROCEDURE — 99213 OFFICE O/P EST LOW 20 MIN: CPT | Mod: 25 | Performed by: STUDENT IN AN ORGANIZED HEALTH CARE EDUCATION/TRAINING PROGRAM

## 2021-03-04 PROCEDURE — 99396 PREV VISIT EST AGE 40-64: CPT | Mod: 25 | Performed by: STUDENT IN AN ORGANIZED HEALTH CARE EDUCATION/TRAINING PROGRAM

## 2021-03-04 PROCEDURE — 81001 URINALYSIS AUTO W/SCOPE: CPT | Performed by: STUDENT IN AN ORGANIZED HEALTH CARE EDUCATION/TRAINING PROGRAM

## 2021-03-04 RX ORDER — MEDROXYPROGESTERONE ACETATE 150 MG/ML
150 INJECTION, SUSPENSION INTRAMUSCULAR ONCE
Status: COMPLETED | OUTPATIENT
Start: 2021-03-04 | End: 2021-03-04

## 2021-03-04 RX ORDER — SERTRALINE HYDROCHLORIDE 25 MG/1
25 TABLET, FILM COATED ORAL DAILY
Qty: 30 TABLET | Refills: 3 | Status: SHIPPED | OUTPATIENT
Start: 2021-03-04 | End: 2022-10-06

## 2021-03-04 RX ADMIN — MEDROXYPROGESTERONE ACETATE 150 MG: 150 INJECTION, SUSPENSION INTRAMUSCULAR at 12:05

## 2021-03-04 ASSESSMENT — MIFFLIN-ST. JEOR: SCORE: 1386.09

## 2021-03-04 NOTE — PROGRESS NOTES
Preceptor Attestation:   Patient seen, evaluated and discussed with the resident. I have verified the content of the note, which accurately reflects my assessment of the patient and the plan of care.    Supervising Physician:Hector Renae MD    [facility]

## 2021-03-04 NOTE — PROGRESS NOTES
I have personally reviewed the history and examination as documented by Dr. Francis.  I was present during key portions of the visit and agree with the assessment and plan as documented for 50 yr old female w/ HTN here for well visit, mood mgmt and contraceptive mgmt.  Will start meds for mood, check labs for resistant HTN and give Depo. Precautions given. Anticipatory guidance given.     Mickey Mendoza MD  March 4, 2021  11:33 AM

## 2021-03-04 NOTE — PROGRESS NOTES
Female Physical Note    Concerns today:  Anxiety  -Feels like mood has been out of control recently   -Having racing thoughts  -Feels overwhelmed at home  -Not sleeping well  -Has not been on medication in the past  -feeling very stressed out at work      ROS:  CONSTITUTIONAL: no fatigue, no unexpected change in weight  SKIN: no worrisome rashes, no worrisome moles, no worrisome lesions  EYES: no acute vision problems or changes  ENT: no ear problems, no mouth problems, no throat problems  RESP: no significant cough, no shortness of breath  CV: no chest pain, no palpitations, no new or worsening peripheral edema  GI: no nausea, no vomiting, no constipation, no diarrhea    Sexually Active: No  Sexual concerns: No   Contraception:Depo-Provera   P: 3  Menarche: patient was 13 No LMP recorded. Patient has had an injection. LMP  (on DEPO)  STD History: Pos - had chlamydia 32 years ago  Last Pap Smear Date: 2015 normal  Abnormal Pap History: None    Patient Active Problem List   Diagnosis     Essential hypertension     Encounter for surveillance of injectable contraceptive       Current Outpatient Medications   Medication Sig Dispense Refill     amLODIPine (NORVASC) 10 MG tablet Take 1 tablet (10 mg) by mouth daily 90 tablet 3     hydrochlorothiazide (HYDRODIURIL) 25 MG tablet Take 1 tablet (25 mg) by mouth daily 90 tablet 3       Past Medical History:   Diagnosis Date     Hypertension         Family History     Problem (# of Occurrences) Relation (Name,Age of Onset)    Hypertension (1) Mother       Negative family history of: Diabetes, Coronary Artery Disease, Breast Cancer, Cancer - colorectal, Ovarian Cancer, Prostate Cancer, Other Cancer, Cerebrovascular Disease, Thyroid Disease, Asthma          Problem List Medication List and Allergy List were reviewed.    Patient is an established patient of this clinic..    Social History     Tobacco Use     Smoking status: Former Smoker     Packs/day: 0.25      "Years: 1.00     Pack years: 0.25     Types: Cigarettes     Smokeless tobacco: Former User     Quit date: 2/26/2010   Substance Use Topics     Alcohol use: Yes     Alcohol/week: 0.0 standard drinks     Comment: Ocassionally     Single  Children ? yes    Has anyone hurt you physically, for example by pushing, hitting, slapping or kicking you or forcing you to have sex? Denies  Do you feel threatened or controlled by a partner, ex-partner or anyone in your life? Denies    RISK BEHAVIORS AND HEALTHY HABITS:  Tobacco Use/Smoking: None  Illicit Drug Use: None  Do you use alcohol? Yes  Number of drinks per day occasional  Number of drinking days a week occasional  Diet (5-7 servings of fruits/veg daily): Yes   Exercise (30 min accumulated most days):Yes  Dental Care: Yes   Calcium 1500 mg/d:  Yes  Seat Belt Use: Yes     CV Risk based on Pooled Cohort Risk:1.3%     Immunization History   Administered Date(s) Administered     TDAP Vaccine (Boostrix) 10/26/2015    Reviewed Immunization Record Today    EXAMINATION:   BP (!) 154/98   Pulse 94   Temp 99.1  F (37.3  C)   Resp 22   Ht 1.702 m (5' 7\")   Wt 73.8 kg (162 lb 12.8 oz)   SpO2 100%   BMI 25.50 kg/m    GENERAL: healthy, alert and no distress  EYES: Eyes grossly normal to inspection, extraocular movements - intact, and PERRL  HENT: ear canals- normal; TMs- normal; Nose- normal; Mouth- no ulcers, no lesions  NECK: no tenderness, no adenopathy, no asymmetry, no masses, no stiffness; thyroid- normal to palpation  RESP: lungs clear to auscultation - no rales, no rhonchi, no wheezes  BREAST: deferred  CV: regular rates and rhythm, normal S1 S2, no S3 or S4 and no murmur, no click or rub -  ABDOMEN: soft, no tenderness, no  hepatosplenomegaly, no masses, normal bowel sounds  MS: extremities- no gross deformities noted, no edema  SKIN: no suspicious lesions, no rashes  NEURO: strength and tone- normal, sensory exam- grossly normal, mentation- intact, speech- normal, " reflexes- symmetric  BACK: no CVA tenderness, no paralumbar tenderness  - female: cervix- normal, adnexae- normal; uterus- normal, no masses, no discharge  RECTAL- female: deferred   PSYCH: Alert and oriented times 3; speech- coherent , normal rate and volume; able to articulate logical thoughts, able to abstract reason, no tangential thoughts, no hallucinations or delusions, affect- normal    ASSESSMENT/PLAN:    1. Routine general medical examination at a health care facility  Due for pap and patient requesting STD testing. Not experiencing vaginal pain, discharge, burning with urination or urinary frequency. Asymptomatic so will not check wet-prep  - GYN Cytology ([facility])  - Chlamydia/Gono Amplified ([facility])  - Syphilis Screen Ophelia (RPR/VDRL) ([facility])  - HIV Ag/Ab Screen Ophelia ([facility])    2. Hematuria, unspecified type  History of hematuria noted on previous UA but did show calcium oxalate crystals. Discussed methods to decrease risk of kidney stones in the future. Suspect kidney stones as cause of hematuria. Recheck UA today to make sure resolves   - Urinalysis, Micro If ([facility])  - Urine Microscopic ([facility])    3. Benign essential hypertension  BP still not controlled today despite taking medications today. History of hypokalemia noted previously so will check renin/aries ratio. Otherwise patient believes stress is contributing. See below.   - Basic Metabolic Panel ([facility]) - Results < 1 hr  - Renin Activity ([facility])  - Aldosterone, Serum ([facility])    4. Generalized anxiety disorder  Patient with significant anxiety interfering with day to day life. Feels overwhelmed at home and not sleeping well. States this has been ongoing for years. Discussed at length today role of psychotherapy however patient would prefer to start with just medication at this time. Will start with low-dose zoloft and follow up in 2 weeks to see how responing  - sertraline (ZOLOFT) 25 MG tablet; Take 1 tablet  (25 mg) by mouth daily  Dispense: 30 tablet; Refill: 3    5. Encounter for surveillance of injectable contraceptive  3/7/2021  Plan Documentation  Service ordered Depo Provera injection (150mg IM). Patient states she only would like one more injection as she is currently experiencing menopause.   Plan and order should be renewed one year from 3/7/2021 .  Ordered by Charles Francis MD.    Would have preferred combined OCP to manage agustina-menopausal symptoms but BP to elevated. Depo today.   - medroxyPROGESTERone (DEPO-PROVERA) injection 150 mg    Precepted with Dr. Reji Francis MD  [facility] Medicine Resident  Pager# [phone]

## 2021-03-04 NOTE — NURSING NOTE
Clinic Administered Medication Documentation      Depo Provera Documentation    URINE HCG: negative    Depo-Provera Standing Order inclusion/exclusion criteria reviewed.   Patient meets: inclusion criteria     BP: 154/98  LAST PAP/EXAM: 3/4/21    Prior to injection, verified patient identity using patient's name and date of birth. Medication was administered. Please see MAR and medication order for additional information.     Was entire vial of medication used? Yes  Vial/Syringe: Single dose vial  Expiration Date:  8/2022    Patient instructed to stay in clinic after the injection but patient declined.  NEXT INJECTION DUE: 5/20/21 - 6/3/21 if pt decide to continue Depo       Name of provider who requested the medication administration: Tito  Name of provider on site (faculty or community preceptor) at the time of performing the medication administration: Reji    Date of next administration: Per MD this is pt's last dose. Will do different method  Date of next office visit with provider to renew medication plan (must be seen annually): JOELLE

## 2021-03-05 LAB
C TRACH RRNA CVX QL NAA+PROBE: NEGATIVE
FINAL DIAGNOSIS: ABNORMAL
HPV HR 12 DNA CVX QL NAA+PROBE: POSITIVE
HPV16 DNA SPEC QL NAA+PROBE: NEGATIVE
HPV18 DNA SPEC QL NAA+PROBE: NEGATIVE
N GONORRHOEA RRNA SPEC QL NAA+PROBE: NEGATIVE
SPECIMEN DESCRIPTION: ABNORMAL
SPECIMEN SOURCE CVX/VAG CYTO: ABNORMAL
TREPONEMA ANTIBODY (SYPHILIS): NEGATIVE

## 2021-03-06 LAB — ALDOST SERPL-MCNC: 11.4 NG/DL

## 2021-03-12 LAB — RENIN PLAS-CCNC: 0.7 NG/ML/HR

## 2021-03-15 ENCOUNTER — RECORDS - HEALTHEAST (OUTPATIENT)
Dept: ADMINISTRATIVE | Facility: OTHER | Age: 51
End: 2021-03-15

## 2021-03-15 ENCOUNTER — TELEPHONE (OUTPATIENT)
Dept: FAMILY MEDICINE | Facility: CLINIC | Age: 51
End: 2021-03-15

## 2021-03-15 DIAGNOSIS — E87.6 HYPOKALEMIA: Primary | ICD-10-CM

## 2021-03-15 LAB
CYTOLOGY CVX/VAG DOC THIN PREP: ABNORMAL
HIGH RISK?: NO
HPV REFLEX?: ABNORMAL
LAB AP ABNORMAL BLEEDING: NO
LAB AP BIRTH CONTROL/HORMONES: ABNORMAL
LAB AP CERVICAL APPEARANCE: NORMAL
LAB AP PATIENT STATUS: NO
LAB AP PREVIOUS ABNL: NO
LAB AP PREVIOUS NORMAL: 2015
LAST MENS PERIOD: ABNORMAL
PATH REPORT.COMMENTS IMP SPEC: ABNORMAL
PATH REPORT.COMMENTS IMP SPEC: ABNORMAL
SPECIMEN ADEQUACY:: ABNORMAL

## 2021-03-15 RX ORDER — POTASSIUM CHLORIDE 1500 MG/1
20 TABLET, EXTENDED RELEASE ORAL DAILY
Qty: 30 TABLET | Refills: 3 | Status: SHIPPED | OUTPATIENT
Start: 2021-03-15 | End: 2021-04-14

## 2021-03-15 NOTE — TELEPHONE ENCOUNTER
Last BMP with potassium of 3.2 with normal renin/aries ration. Suspect hypokalemia 2/2 hydrochlorothiazide use. Given difficult to treat BP will continue hydrochlorothiazide and add potassium supplementation. Called patient and updated on the prescription.     1. Hypokalemia  - potassium chloride ER (K-TAB) 20 MEQ CR tablet; Take 1 tablet (20 mEq) by mouth daily  Dispense: 30 tablet; Refill: 3      Charles Francis MD

## 2021-03-16 NOTE — RESULT ENCOUNTER NOTE
"Please call patient and update on the following results:    Hi Noverta,   Your pap smear showed what is called \"Atypical Squamous Cells of Undetermined Significance.\" In order to determine your risk of cervical cancer with that results we check to see if you were positive for HPV or the virus that can cause cervical cancer. Your test did come back positive for this however your risk of cervical cancer is still low and so based on the current guidelines you will need to have the pap smear with HPV testing again in 1 year.     Let me know if there are any questions.   Thanks,  Charles Francis MD"

## 2021-03-18 ENCOUNTER — OFFICE VISIT (OUTPATIENT)
Dept: FAMILY MEDICINE | Facility: CLINIC | Age: 51
End: 2021-03-18
Payer: COMMERCIAL

## 2021-03-18 VITALS
RESPIRATION RATE: 20 BRPM | OXYGEN SATURATION: 100 % | HEART RATE: 100 BPM | SYSTOLIC BLOOD PRESSURE: 158 MMHG | HEIGHT: 67 IN | BODY MASS INDEX: 25.68 KG/M2 | TEMPERATURE: 100 F | WEIGHT: 163.6 LBS | DIASTOLIC BLOOD PRESSURE: 89 MMHG

## 2021-03-18 DIAGNOSIS — F41.9 ANXIETY: ICD-10-CM

## 2021-03-18 DIAGNOSIS — I10 ESSENTIAL HYPERTENSION: Primary | ICD-10-CM

## 2021-03-18 LAB
BUN SERPL-MCNC: 9 MG/DL (ref 7–30)
CALCIUM SERPL-MCNC: 9.6 MG/DL (ref 8.5–10.4)
CHLORIDE SERPLBLD-SCNC: 103 MMOL/L (ref 94–109)
CO2 SERPL-SCNC: 30 MMOL/L (ref 20–32)
CREAT SERPL-MCNC: 0.8 MG/DL (ref 0.6–1.3)
EGFR CALCULATED (BLACK REFERENCE): >90 ML/MIN
EGFR CALCULATED (NON BLACK REFERENCE): 80.4 ML/MIN
GLUCOSE SERPL-MCNC: 111 MG/DL (ref 60–109)
POTASSIUM SERPL-SCNC: 3.4 MMOL/L (ref 3.4–5.3)
SODIUM SERPL-SCNC: 144 MMOL/L (ref 133–144)

## 2021-03-18 PROCEDURE — 99214 OFFICE O/P EST MOD 30 MIN: CPT | Mod: GC | Performed by: STUDENT IN AN ORGANIZED HEALTH CARE EDUCATION/TRAINING PROGRAM

## 2021-03-18 PROCEDURE — 36415 COLL VENOUS BLD VENIPUNCTURE: CPT | Performed by: STUDENT IN AN ORGANIZED HEALTH CARE EDUCATION/TRAINING PROGRAM

## 2021-03-18 PROCEDURE — 80048 BASIC METABOLIC PNL TOTAL CA: CPT | Performed by: STUDENT IN AN ORGANIZED HEALTH CARE EDUCATION/TRAINING PROGRAM

## 2021-03-18 RX ORDER — LISINOPRIL 5 MG/1
5 TABLET ORAL DAILY
Qty: 30 TABLET | Refills: 0 | Status: SHIPPED | OUTPATIENT
Start: 2021-03-18 | End: 2021-04-14

## 2021-03-18 ASSESSMENT — MIFFLIN-ST. JEOR: SCORE: 1389.71

## 2021-03-18 NOTE — PROGRESS NOTES
CHIEF COMPLAINT                                                      Chief Complaint   Patient presents with     RECHECK     BP     Forms     FMLA     Medication Reconciliation     Completed     SUBJECTIVE:                                                    Bharath Forbes is a 50 year old year old female who presents to clinic today for the following health issues:    Follow up of HTN:  -Not checking BP at home  -No chest pain, shortness of breath, lightheadedness or dizziness  -No issues with medications and is taking these daily    Follow up anxiety:  -Feels like anxiety is getting worse  -Not able to function at home  -Feels overwhelmed  -Reports racing thoughts and difficulty with sleep  -Interested in starting therapy  -Has not started taking the zoloft yet    Patient is an established patient of this clinic.    ----------------------------------------------------------------------------------------------------------------------  Patient Active Problem List   Diagnosis     Essential hypertension     Encounter for surveillance of injectable contraceptive     ASCUS with positive high risk HPV cervical     Past Surgical History:   Procedure Laterality Date     NO HISTORY OF SURGERY         Social History     Tobacco Use     Smoking status: Former Smoker     Packs/day: 0.25     Years: 1.00     Pack years: 0.25     Types: Cigarettes     Smokeless tobacco: Former User     Quit date: 2/26/2010   Substance Use Topics     Alcohol use: Yes     Alcohol/week: 0.0 standard drinks     Comment: Ocassionally     Family History   Problem Relation Age of Onset     Hypertension Mother      Diabetes No family hx of      Coronary Artery Disease No family hx of      Breast Cancer No family hx of      Cancer - colorectal No family hx of      Ovarian Cancer No family hx of      Prostate Cancer No family hx of      Other Cancer No family hx of      Cerebrovascular Disease No family hx of      Thyroid Disease No family hx of       "Asthma No family hx of          Problem list and past medical, surgical, social, and family histories reviewed & adjusted, as indicated.    Current Outpatient Medications   Medication Sig Dispense Refill     amLODIPine (NORVASC) 10 MG tablet Take 1 tablet (10 mg) by mouth daily 90 tablet 3     hydrochlorothiazide (HYDRODIURIL) 25 MG tablet Take 1 tablet (25 mg) by mouth daily 90 tablet 3     potassium chloride ER (K-TAB) 20 MEQ CR tablet Take 1 tablet (20 mEq) by mouth daily 30 tablet 3     sertraline (ZOLOFT) 25 MG tablet Take 1 tablet (25 mg) by mouth daily 30 tablet 3     Medication list reviewed and updated as indicated.    Allergies   Allergen Reactions     Nka [No Known Allergies]      Allergies reviewed and updated as indicated.  ----------------------------------------------------------------------------------------------------------------------  ROS:     ROS: 10 point ROS neg other than the symptoms noted above in the HPI.    OBJECTIVE:     BP (!) 158/89   Pulse 100   Temp 100  F (37.8  C)   Resp 20   Ht 1.702 m (5' 7\")   Wt 74.2 kg (163 lb 9.6 oz)   SpO2 100%   BMI 25.62 kg/m    Body mass index is 25.62 kg/m .  GENERAL: Appears well and in no acute distress.  CARDIOVASCULAR: Regular rate and rhythm, normal S1 and S2 without murmur. No extra heartsounds or friction rub.   RESPIRATORY: Lungs clear to auscultation bilaterally. No wheezing or crackles. No prolonged expiration. Symmetrical chest rise.  MSK: No gross extremity deformities. No peripheral edema. Normal muscle bulk.    Diagnostic Test Results:  Results for orders placed or performed in visit on 03/18/21   Basic Metabolic Panel ([facility]) - Results < 1 hr     Status: Abnormal   Result Value Ref Range    Glucose 111.0 (H) 60.0 - 109.0 mg/dL    Urea Nitrogen 9.0 7.0 - 30.0 mg/dL    Creatinine 0.8 0.6 - 1.3 mg/dL    Sodium 144.0 133.0 - 144.0 mmol/L    Potassium 3.4 3.4 - 5.3 mmol/L    Chloride 103.0 94.0 - 109.0 mmol/L    Carbon Dioxide 30.0 20.0 " - 32.0 mmol/L    Calcium 9.6 8.5 - 10.4 mg/dL    eGFR Calculated (Non Black Reference) 80.4 >60.0 mL/min    eGFR Calculated (Black Reference) >90 >60.0 mL/min       ASSESSMENT/PLAN:     1. Essential hypertension  BP not at goal with amlodipine and 25 mg hydrochlorothiazide. Renin/aries obtained at last visit and was negative. STOP-BANG score of 3 some some concern for DALIA which could contributing to her difficult to control BP. Will start 3rd medication today. If unable to control with 3 medications would meet criteria for resistant HTN and will complete work-up for that at that time. BMP normal today, will have patient record BP at home with cuff and follow up in 2 weeks for BMP recheck.   - Basic Metabolic Panel ([facility]) - Results < 1 hr  - lisinopril (ZESTRIL) 5 MG tablet; Take 1 tablet (5 mg) by mouth daily  Dispense: 30 tablet; Refill: 0    2. Anxiety  Started on zoloft at last visit but did not start taking this. Encouraged patient to start this today. Now interested in starting psychotherapy referral sent.   - [facility]FERR[facility]  -    Patient Instructions   1. With your elevated blood pressure today let's start a new medication.   2. Follow up 2 weeks to recheck labs  3. Start taking the zoloft for your mood.   4. Referral sent today to start therapy for your mood. We will be calling to set this us.       Schedule follow-up appointment in 2 week(s).      There are no discontinued medications.    Charles Francis MD  [facility] Medicine Resident  Pager# [phone]    Precepted with: Dr. Mendoza.     Options for treatment and follow-up care were reviewed with the patient and/or guardian. Bharath Forbes and/or guardian engaged in the decision making process and verbalized understanding of the options discussed and agreed with the final plan

## 2021-03-18 NOTE — PROGRESS NOTES
I have personally reviewed the history and examination as documented by Dr. Francis.  I was present during key portions of the visit and agree with the assessment and plan as documented for 50 yr old female with HTN here for follow-up. Will add 3rd agent. If no better, will eval for sleep apnea. Precautions given. Anticipatory guidance given.     Mickey Mendoza MD  March 18, 2021  11:31 AM

## 2021-03-18 NOTE — PATIENT INSTRUCTIONS
1. With your elevated blood pressure today let's start a new medication.   2. Follow up 2 weeks to recheck labs  3. Start taking the zoloft for your mood.   4. Referral sent today to start therapy for your mood. We will be calling to set this us.

## 2021-03-19 ENCOUNTER — TELEPHONE (OUTPATIENT)
Dept: FAMILY MEDICINE | Facility: CLINIC | Age: 51
End: 2021-03-19

## 2021-03-19 ENCOUNTER — DOCUMENTATION ONLY (OUTPATIENT)
Dept: PSYCHOLOGY | Facility: CLINIC | Age: 51
End: 2021-03-19

## 2021-03-19 NOTE — TELEPHONE ENCOUNTER
[facility] phone call message- patient requesting form status:    Type of Form: FMLA    Given to: Provider    Submitted: within 10 business days     Date Submitted: 03/18/2021     Date Needed by:     Additional Comments: Patient is calling to see if Dr. Francis got her FMLA foms completed? Told her I don't see it back and not scanned in chart yet so will take a message for Dr. Francis. Did let her know that Dr. Francis is gone for the day and will be back on Monday. She states she will call back on Monday. Told her it could take up to two business days for a response. Please call and advise.     OK to leave a message on voice mail?     Primary language: English      needed? No    Call taken on March 19, 2021 at 1:07 PM by Vincenzo Leon

## 2021-03-19 NOTE — RESULT ENCOUNTER NOTE
Please call Bharath with the following result:    Hi Bharath,  Your potassium has come back in the normal range at 3.4 so you do not need to take the potassium supplement that I previously prescribed. Please take the new blood pressure medication and let us know if you have any trouble getting this.     Thanks,  Charles Francis MD

## 2021-03-19 NOTE — LETTER
March 23, 2021       TO: Bharath Forbes  [address] [place] [address]         Dear Bharath,    I wanted to follow up from our call 3/23/2021 with regards to your referral. I have faxed the referral to [facility] as you requested. Please contact me with any questions, concerns or would like some additional assistance.     [facility]  [address]  [address]  [place], [place] [id number] [phone] Phone  [phone] Fax  Hours: M-F 7:30-5:30pm     [facility] [place] [facility]  [address]   [address]  [place], [place] [address] [phone] (for appointments)  Fax: [phone]  7:30 am - 5 pm M-F, appointments available on Saturdays     [facility]  [address]  [place], [place] [id number] [phone] Phone  [phone] Fax  M-F 7:30AM-7PM  Saturday 8AM-2PM           [facility] ([facility]  [address] [place] [address] [address],   [place], [place] [id number] [phone] Phone  [phone] Fax  M-Th: 8:30-5pm  F: 8:30-4:30pm     [facility]  [address] [place] [id number]  [phone] Phone  [phone] Fax  Monday-Friday: 9am -9pm  Saturdays: 9am- 2pm     [facility]  [address] [place] [address]  [phone] Phone  [phone] Fax  M-Th 8-8pm  F 8-4:30pm      Nena Aguila, [facility] Care Coordinator  Direct: [phone]

## 2021-03-19 NOTE — TELEPHONE ENCOUNTER
I called pt with her lab results. I explained to the patient that the providers have up to 7 days to fill out FMLA forms. I asked that the patient check back Monday afternoon or Tuesday morning. I will send Dr. Francis this telephone encounter so he is aware that pt is waiting for the forms to be completed.

## 2021-03-19 NOTE — PROGRESS NOTES
Patient in need of psychotherapy to assist with mood and anxiety. Please consider the following:    [facility]  [address]  [address]  [place], [place] [address]  [phone] Phone  [phone] Fax  Hours: M-F 7:30-5:30pm    [facility] [place] [facility]  [address]   [address]  [place] [address]  [phone] (for appointments)  Fax: [phone]  7:30 am - 5 pm M-F, appointments available on     [facility]  [address]  [address]  [place] [address]  [phone] Phone  [phone] Fax  M-F 7:30AM-7PM  Saturday 8AM-2PM    [facility]  [address] [place] [address] [address],   [place] [address]  [phone] Phone  [phone] Fax  M-Th: 8:30-5pm  F: 8:30-4:30pm    [facility]  [address] [place] [address]  [phone] Phone  [phone] Fax  Monday-Friday: 9am -9pm  : 9am- 2pm    [facility]  [address] [place] [address]  [phone] Phone  [phone] Fax  M-Th 8-8pm  F 8-4:30pm        Referred By  Referred To   Charles Francis MD   [address] [place] [address]   Phone: [phone]   Fax: [phone]    Diagnoses: Anxiety   Order: [facility] Mental Health Referral -       Comment:  needed: No   Language: English   Patient Name   Bharath Forbes MRN   [id number] Sex   Female    1970   Patient Demographics    Address   [address] [place] [address] [place] [address] Phone   [phone] (Home) *Preferred*   [phone] (Mobile)   Referring Provider    Referred MD Paul          Base CPT Code (Reference Only)    Code CPT Chargeables   9035.005 9035    Existing Charges  Charge Line Charge Status Charge Trigger Charge Type   None       Order Information    Order Date/Time Release Date/Time Start Date/Time End Date/Time   21 11:34 AM None 3/18/2021 None   Order  Details    Frequency Duration Priority Order Class   None None Routine External referral   Order Providers    Authorizing Provider Encounter Provider   Charles Francis MD Hunt, Kyle, MD   ABN Associated with this Order    There is no ABN associated with this order.                  Ordering Provider's NPI: [id number]  Charles Francis    [facility] MENTAL HEALTH REFERRAL  - [[id number]]    Electronically signed by: Charles Francis MD on 03/18/21 1134 Status: Active   Ordering user: Charles Francis MD 03/18/21 1134   Order History  Outpatient  Date/Time Action Taken User Additional Information   03/18/21 1134 Sign Charles Francis MD    Comments     needed: No   Language: English          Order Questions    Question Answer Comment   Reason for Referral: Anxiety     Depression    Adult or Child/Adolescent: Adult    Type of Referral (Indicate all that apply): Psychotherapy - for diagnosis and non-pharmacological treatment    Currently receiving mental health services (if 'Yes', use free jacob box - what services and why today's referral?) No    Previous psych hospitalization: No           Associated Diagnoses    Anxiety [F41.9]

## 2021-03-23 NOTE — TELEPHONE ENCOUNTER
Out going call made to discuss mental health referral. Patient has requested referral to [facility]  Referral, face sheet and most recent clinic visit note faxed to [phone]. In addition letter with referral information mailed to patient's confirmed home address.

## 2021-03-23 NOTE — TELEPHONE ENCOUNTER
Notified patient that I faxed them yesterday (03/22/21), made a copy for her to  and placed at the  when she is free.

## 2021-03-23 NOTE — TELEPHONE ENCOUNTER
Patient called regarding FMLA forms, notified patient that forms can take 7-10 business days, patient understood and would like a call back once forms are completed.

## 2021-04-05 DIAGNOSIS — I10 BENIGN ESSENTIAL HYPERTENSION: ICD-10-CM

## 2021-04-05 RX ORDER — AMLODIPINE BESYLATE 10 MG/1
10 TABLET ORAL DAILY
Qty: 90 TABLET | Refills: 3 | Status: SHIPPED | OUTPATIENT
Start: 2021-04-05 | End: 2022-04-27

## 2021-04-13 NOTE — PROGRESS NOTES
Medication Therapy Management (MTM) Encounter    ASSESSMENT:                            HTN: Patient is close to meeting her blood pressure goal of <140/90 mmHg. With proper blood pressure measuring technique and switching lisinopril and amlodipine to bedtime, I anticipate both will provide more blood pressure lowering. Review of 10 year risk indicates that we need to assess for any risk enhancing factors which would indicate the use of statin therapy. If none, then should talk about lifestyle modifications to help lower risk of future heart disease.     Vascular Risk Enhancers (RE)   Family history of premature ASCVD (males <55, females <65)   Triglycerides > 175 mg/dL   LDL-C >160 mg/dL   nHDL > 190 mg/dL   CKD 3 or worse (eGFR <60)   Metabolic syndrome   Ethnicity (South  ancestry)   Inflammatory disease (psoriasis, RA, HIV)   Women (preeclampsia, menopause <40)   KAYLA <0.9 (PAD)   hsCRP >2.0   Elevated Lp(a), apoB        [place]: Patient does not think she needs sertraline. Will revisit this again in the next visit.     PLAN:                            1. Move the Lisinopril and Amlodipine to bedtime  2. Keep the hydrochlorothiazide in the morning  3. Make sure to rest at least 5 minutes before measuring blood pressure.       Follow-up: 3 weeks, scheduled today (patient and provider schedules (myself nor PCP) does not allow for 2 week follow up per clinic policy)    SUBJECTIVE/OBJECTIVE:                          Bharath Forbes is a 51 year old female coming in for an initial visit. She was referred to me from Dr. Francis.      Reason for visit: blood pressure follow up.    Allergies/ADRs: Reviewed in chart  Social History     Tobacco Use     Smoking status: Former Smoker     Packs/day: 0.25     Years: 1.00     Pack years: 0.25     Types: Cigarettes     Smokeless tobacco: Former User     Quit date: 2/26/2010   Substance Use Topics     Alcohol use: Yes     Alcohol/week: 0.0 standard drinks     Comment:  "Ocassionally     Drug use: Yes     Types: Marijuana     ^Reviewed today  Past Medical History: Reviewed in chart    Medication Adherence/Access: no issues reported    HTN:  Patient is taking hydrochlorothiazide 25 mg daily, amlodipine 10 mg daily, and lisinopril 5 mg daily. She takes them daily (9-12pm), and has not missed a dose. She denies any side effects. She started documenting her blood pressure daily after her last visit with , however, she lost them. She does not remember the numbers, but notes \"it has been better\". She has proper technique for blood pressure, except she does not have her back supported and does not wait 5 minutes.    She checked her blood pressure twice this morning at home.  -7:30 am (before medications): 134/95 mmHg  -10:30 am (15 min after medications): 137/97 mmHg    BP Readings from Last 3 Encounters:   04/14/21 (!) 143/86   03/18/21 (!) 158/89   03/04/21 (!) 154/98       The 10-year ASCVD risk score (Tashiatiffany BARTON Jr., et al., 2013) is: 6.4%    Values used to calculate the score:      Age: 51 years      Sex: Female      Is Non- : Yes      Diabetic: No      Tobacco smoker: No      Systolic Blood Pressure: 158 mmHg      Is BP treated: Yes      HDL Cholesterol: 58 mg/dL      Total Cholesterol: 152 mg/dL      [place]: Patient has not started taking sertraline. She feels that the anxiety was caused by stress from work. She took a 2 week break from her job, and went out of state. She is now ready to go back to work on Tuesday. She feels less stress now.      Today's Vitals: BP (!) 143/86   Pulse 121   Temp 99.3  F (37.4  C)   Resp 18   Ht 5' 6.54\" (1.69 m)   Wt 161 lb 3.2 oz (73.1 kg)   SpO2 99%   BMI 25.60 kg/m    ----------------      I spent 30 minutes with this patient today. Dr. Francis was provided the recommendations above  via routed note and Dr. Ma is the authorizing prescriber for this visit through the pharmacist collaborative practice agreement.. A " copy of the visit note was provided to the patient's primary care provider.    The patient was given a summary of these recommendations.     Anna Marie Parikh, PharmD, [facility] (previously, CDE)  [facility]  Phone: [phone]  April 13, 2021 at 4:24 PM    For insurance/tracking purposes, MTM Team Documentation:    Medication Therapy Recommendations  Essential hypertension    Current Medication: lisinopril (ZESTRIL) 5 MG tablet   Rationale: Incorrect administration   Recommendation: Change Administration Time - change Lisinopril and Amlodipine to HS   Status: Patient Agreed - Adherence/Education

## 2021-04-14 ENCOUNTER — OFFICE VISIT (OUTPATIENT)
Dept: PHARMACY | Facility: CLINIC | Age: 51
End: 2021-04-14
Payer: COMMERCIAL

## 2021-04-14 VITALS
OXYGEN SATURATION: 99 % | TEMPERATURE: 99.3 F | BODY MASS INDEX: 25.3 KG/M2 | WEIGHT: 161.2 LBS | RESPIRATION RATE: 18 BRPM | HEIGHT: 67 IN | HEART RATE: 121 BPM | SYSTOLIC BLOOD PRESSURE: 143 MMHG | DIASTOLIC BLOOD PRESSURE: 86 MMHG

## 2021-04-14 DIAGNOSIS — I10 ESSENTIAL HYPERTENSION: Primary | ICD-10-CM

## 2021-04-14 PROCEDURE — 99605 MTMS BY PHARM NP 15 MIN: CPT | Performed by: PHARMACIST

## 2021-04-14 PROCEDURE — 99607 MTMS BY PHARM ADDL 15 MIN: CPT | Performed by: PHARMACIST

## 2021-04-14 RX ORDER — LISINOPRIL 5 MG/1
5 TABLET ORAL DAILY
Qty: 30 TABLET | Refills: 1 | Status: SHIPPED | OUTPATIENT
Start: 2021-04-14 | End: 2022-01-26

## 2021-04-14 ASSESSMENT — MIFFLIN-ST. JEOR: SCORE: 1366.44

## 2021-04-14 NOTE — PATIENT INSTRUCTIONS
Recommendations from today's visit:                                                      1. Move the Lisinopril and Amlodipine to bedtime  2. Keep the hydrochlorothiazide in the morning  3. Make sure to rest at least 5 minutes before measuring blood pressure.       It was great to speak with you today!    I value your experience. You may receive a survey via email or text message in the next few days. I would be very thankful for your time in providing feedback.    Pharmacist's contact information:                                                      Please feel free to contact me with any questions or concerns you have.     Anna Marie Parikh  Pharmacist, Certified Diabetes Care and   [facility]  Phone: [phone]  April 14, 2021 at 12:17 PM

## 2021-05-25 ENCOUNTER — COMMUNICATION - HEALTHEAST (OUTPATIENT)
Dept: FAMILY MEDICINE | Facility: CLINIC | Age: 51
End: 2021-05-25

## 2021-06-04 VITALS — WEIGHT: 160 LBS | HEIGHT: 67 IN | BODY MASS INDEX: 25.11 KG/M2

## 2021-06-10 ENCOUNTER — COMMUNICATION - HEALTHEAST (OUTPATIENT)
Dept: FAMILY MEDICINE | Facility: CLINIC | Age: 51
End: 2021-06-10

## 2021-06-25 NOTE — TELEPHONE ENCOUNTER
RN cannot approve Refill Request    RN can NOT refill this medication PCP messaged that patient is overdue for Labs and Office Visit, Protocol failed and NO refill given and historical medication requested. Last office visit: Visit date not found Last Physical: Visit date not found Last MTM visit: Visit date not found Last visit same specialty: Visit date not found.  Next visit within 3 mo: Visit date not found  Next physical within 3 mo: Visit date not found      Fouzia Herman, [facility] Triage/Med Refill 6/10/2021    Requested Prescriptions   Pending Prescriptions Disp Refills     hydroCHLOROthiazide (HYDRODIURIL) 25 MG tablet 90 tablet 3     Sig: Take 1 tablet (25 mg total) by mouth daily.       Diuretics/Combination Diuretics Refill Protocol  Failed - 6/10/2021 10:16 AM        Failed - Visit with PCP or prescribing provider visit in past 12 months     Last office visit with prescriber/PCP: Visit date not found OR same dept: Visit date not found OR same specialty: Visit date not found  Last physical: Visit date not found Last MTM visit: Visit date not found   Next visit within 3 mo: Visit date not found  Next physical within 3 mo: Visit date not found  Prescriber OR PCP: Tamika Colvin, DO  Last diagnosis associated with med order: There are no diagnoses linked to this encounter.  If protocol passes may refill for 12 months if within 3 months of last provider visit (or a total of 15 months).             Failed - Serum Potassium in past 12 months      No results found for: LN-POTASSIUM          Failed - Serum Sodium in past 12 months      No results found for: LN-SODIUM          Failed - Blood pressure on file in past 12 months     BP Readings from Last 1 Encounters:   02/06/20 132/77             Failed - Serum Creatinine in past 12 months      Creatinine   Date Value Ref Range Status   10/16/2018 0.75 0.60 - 1.10 mg/dL Final              Signed Prescriptions Disp Refills    sertraline (ZOLOFT) 25 MG  tablet         There is no refill protocol information for this order       lisinopriL (PRINIVIL,ZESTRIL) 5 MG tablet         There is no refill protocol information for this order       amLODIPine (NORVASC) 10 MG tablet         There is no refill protocol information for this order

## 2021-07-19 DIAGNOSIS — I10 BENIGN ESSENTIAL HYPERTENSION: ICD-10-CM

## 2021-07-19 RX ORDER — HYDROCHLOROTHIAZIDE 25 MG/1
25 TABLET ORAL DAILY
Qty: 90 TABLET | Refills: 3 | Status: SHIPPED | OUTPATIENT
Start: 2021-07-19 | End: 2022-08-15

## 2021-07-20 NOTE — TELEPHONE ENCOUNTER
"Called Bharath Forbes and she stated \"I will call back when I return home, as I am out of town right now\".  "

## 2022-01-26 ENCOUNTER — OFFICE VISIT (OUTPATIENT)
Dept: FAMILY MEDICINE | Facility: CLINIC | Age: 52
End: 2022-01-26
Payer: COMMERCIAL

## 2022-01-26 VITALS
HEIGHT: 67 IN | DIASTOLIC BLOOD PRESSURE: 111 MMHG | BODY MASS INDEX: 23.7 KG/M2 | RESPIRATION RATE: 20 BRPM | WEIGHT: 151 LBS | HEART RATE: 92 BPM | OXYGEN SATURATION: 97 % | TEMPERATURE: 98.1 F | SYSTOLIC BLOOD PRESSURE: 180 MMHG

## 2022-01-26 DIAGNOSIS — Z30.42 ENCOUNTER FOR SURVEILLANCE OF INJECTABLE CONTRACEPTIVE: ICD-10-CM

## 2022-01-26 DIAGNOSIS — I10 ESSENTIAL HYPERTENSION: ICD-10-CM

## 2022-01-26 DIAGNOSIS — Z12.31 ENCOUNTER FOR SCREENING MAMMOGRAM FOR BREAST CANCER: ICD-10-CM

## 2022-01-26 DIAGNOSIS — Z30.9 ENCOUNTER FOR CONTRACEPTIVE MANAGEMENT, UNSPECIFIED TYPE: Primary | ICD-10-CM

## 2022-01-26 LAB — HCG UR QL: NEGATIVE

## 2022-01-26 PROCEDURE — 99214 OFFICE O/P EST MOD 30 MIN: CPT | Mod: 25 | Performed by: STUDENT IN AN ORGANIZED HEALTH CARE EDUCATION/TRAINING PROGRAM

## 2022-01-26 PROCEDURE — 96372 THER/PROPH/DIAG INJ SC/IM: CPT | Performed by: FAMILY MEDICINE

## 2022-01-26 PROCEDURE — 81025 URINE PREGNANCY TEST: CPT | Performed by: STUDENT IN AN ORGANIZED HEALTH CARE EDUCATION/TRAINING PROGRAM

## 2022-01-26 RX ORDER — MEDROXYPROGESTERONE ACETATE 150 MG/ML
150 INJECTION, SUSPENSION INTRAMUSCULAR
Status: DISCONTINUED | OUTPATIENT
Start: 2022-01-26 | End: 2023-06-26

## 2022-01-26 RX ORDER — LISINOPRIL 5 MG/1
5 TABLET ORAL DAILY
Qty: 90 TABLET | Refills: 3 | Status: SHIPPED | OUTPATIENT
Start: 2022-01-26 | End: 2023-03-06

## 2022-01-26 RX ADMIN — MEDROXYPROGESTERONE ACETATE 150 MG: 150 INJECTION, SUSPENSION INTRAMUSCULAR at 10:41

## 2022-01-26 ASSESSMENT — MIFFLIN-ST. JEOR: SCORE: 1320.17

## 2022-01-26 NOTE — PROGRESS NOTES
ASSESSMENT/PLAN:     Contraceptive management  Encounter for surveillance of injectable contraceptive  Here today to restart depo. Currently on period and UPT negative so not pregnant. Has tolerated this well. Has history of HTN and BP elevated today to 180 systolic which is a relative contraindication to depo shot given the theoretical increased cardiovascular risk however she has previously been under better control and did not take her blood pressure pills this morning. Recommended she take these at home and to call if she has difficulty maintaining systolic BP < 160. Given this OK to restart depo today as she has been under better control with her medications.   - HCG qualitative urine  - HCG qualitative urine  - medroxyPROGESTERone (DEPO-PROVERA) injection 150 mg    Essential hypertension  States she has not been taking the lisinopril as she has been out. Otherwise has enough of the hydrochlorothiazide and amlodipine at home. Will have patient return in 2 weeks to recheck BP and BMP.   - lisinopril (ZESTRIL) 5 MG tablet  Dispense: 90 tablet; Refill: 3       Patient Instructions   1. Depo shot today.   2. Take your blood pressure pills (and new prescription for lisinopril) when you get home and let us know if it is consistently > 160.   3. Follow up in 2 weeks.          Schedule follow-up appointment in 2 week(s).      Medications Discontinued During This Encounter   Medication Reason     lisinopril (ZESTRIL) 5 MG tablet Reorder     medroxyPROGESTERone (DEPO-PROVERA) injection 150 mg        Charles Francis MD  [facility] Medicine Resident  Pager# [phone]    Precepted with: Fuentes Santiago MD    Options for treatment and follow-up care were reviewed with the patient and/or guardian. Bharath Forbes and/or guardian engaged in the decision making process and verbalized understanding of the options discussed and agreed with the final plan    CHIEF COMPLAINT                                                       Chief Complaint   Patient presents with     Contraception     Restart depo     Medication Reconciliation     Not complete     SUBJECTIVE:                                                    Bharath Forbes is a 51 year old year old female who presents to clinic today for the following health issues:    DEPO  -Last injection last march. Period returned this past month. Currently on her period.   - Did not take blood pressure medications  -Reported a history of hot flashes, night sweats but these have resolved  -Currently sexually active    Patient is an established patient of this clinic.    ----------------------------------------------------------------------------------------------------------------------  Patient Active Problem List   Diagnosis     Essential hypertension     Encounter for surveillance of injectable contraceptive     ASCUS with positive high risk HPV cervical     Past Surgical History:   Procedure Laterality Date     NO HISTORY OF SURGERY       [facility] COLONOSCOPY W/WO BRUSH/WASH N/A 2/6/2020    Procedure: COLONOSCOPY;  Surgeon: Hector Renae III, MD;  Location: [facility];  Service: Gastroenterology       Social History     Tobacco Use     Smoking status: Former Smoker     Packs/day: 0.25     Years: 1.00     Pack years: 0.25     Types: Cigarettes     Smokeless tobacco: Former User     Quit date: 2/26/2010   Substance Use Topics     Alcohol use: Yes     Alcohol/week: 0.0 standard drinks     Comment: Ocassionally     Family History   Problem Relation Age of Onset     Hypertension Mother      Diabetes No family hx of      Coronary Artery Disease No family hx of      Breast Cancer No family hx of      Cancer - colorectal No family hx of      Ovarian Cancer No family hx of      Prostate Cancer No family hx of      Other Cancer No family hx of      Cerebrovascular Disease No family hx of      Thyroid Disease No family hx of      Asthma No family hx of          Problem list and past medical,  "surgical, social, and family histories reviewed & adjusted, as indicated.    Current Outpatient Medications   Medication Sig Dispense Refill     lisinopril (ZESTRIL) 5 MG tablet Take 1 tablet (5 mg) by mouth daily 90 tablet 3     amLODIPine (NORVASC) 10 MG tablet Take 1 tablet (10 mg) by mouth daily 90 tablet 3     hydrochlorothiazide (HYDRODIURIL) 25 MG tablet Take 1 tablet (25 mg) by mouth daily 90 tablet 3     sertraline (ZOLOFT) 25 MG tablet Take 1 tablet (25 mg) by mouth daily 30 tablet 3     Medication list reviewed and updated as indicated.    Allergies   Allergen Reactions     Nka [No Known Allergies]      Allergies reviewed and updated as indicated.  ----------------------------------------------------------------------------------------------------------------------  ROS:     ROS: 10 point ROS neg other than the symptoms noted above in the HPI.    OBJECTIVE:     BP (!) 180/111   Pulse 92   Temp 98.1  F (36.7  C)   Resp 20   Ht 1.69 m (5' 6.54\")   Wt 68.5 kg (151 lb)   SpO2 97%   BMI 23.98 kg/m    Body mass index is 23.98 kg/m .  GENERAL: Appears well and in no acute distress.  CARDIOVASCULAR: Regular rate and rhythm, normal S1 and S2 without murmur. No extra heartsounds or friction rub.   RESPIRATORY: Lungs clear to auscultation bilaterally. No wheezing or crackles. No prolonged expiration. Symmetrical chest rise.  MSK: No gross extremity deformities. No peripheral edema. Normal muscle bulk.    Diagnostic Test Results:  Results for orders placed or performed in visit on 01/26/22 (from the past 24 hour(s))   HCG qualitative urine   Result Value Ref Range    hCG Urine Qualitative Negative Negative    Narrative    sg ok             "

## 2022-01-26 NOTE — NURSING NOTE
Clinic Administered Medication Documentation    Administrations This Visit     medroxyPROGESTERone (DEPO-PROVERA) injection 150 mg     Admin Date  01/26/2022 Action  Given Dose  150 mg Route  Intramuscular Site   Administered By  Katelynn Still RMA    Ordering Provider: Kaiden Santiago MD    NDC: 79449-761-84    Lot#: [id number]    : [facility]    Patient Supplied?: No                  Depo Provera Documentation    URINE HCG: negative    Depo-Provera Standing Order inclusion/exclusion criteria reviewed.   Patient meets: inclusion criteria     BP: 180/111  LAST PAP/EXAM: No results found for: PAP    Prior to injection, verified patient identity using patient's name and date of birth. Medication was administered. Please see MAR and medication order for additional information.     Was entire vial of medication used? Yes  Vial/Syringe: Single dose vial  Expiration Date:  7/23    Patient instructed to stay in clinic after the injection but patient declined.        Name of provider who requested the medication administration: Tito  Name of provider on site (faculty or community preceptor) at the time of performing the medication administration: Jack    NEXT INJECTION DUE: 4/13/22 - 4/27/22  Date of next office visit with provider to renew medication plan (must be seen annually): 1/26/23

## 2022-01-26 NOTE — PATIENT INSTRUCTIONS
1. Depo shot today.   2. Take your blood pressure pills (and new prescription for lisinopril) when you get home and let us know if it is consistently > 160.   3. Follow up in 2 weeks.

## 2022-01-27 NOTE — PROGRESS NOTES
Preceptor Attestation:  Patient's case reviewed and discussed with Charles Francis MD resident and I evaluated the patient. I agree with written assessment and plan of care.  Supervising Physician:  Kaiden Santiago MD, MD [place]  [facility]

## 2022-04-15 ENCOUNTER — ALLIED HEALTH/NURSE VISIT (OUTPATIENT)
Dept: FAMILY MEDICINE | Facility: CLINIC | Age: 52
End: 2022-04-15
Payer: COMMERCIAL

## 2022-04-15 VITALS
RESPIRATION RATE: 24 BRPM | DIASTOLIC BLOOD PRESSURE: 88 MMHG | BODY MASS INDEX: 24.11 KG/M2 | OXYGEN SATURATION: 99 % | HEIGHT: 66 IN | TEMPERATURE: 98.1 F | WEIGHT: 150 LBS | HEART RATE: 109 BPM | SYSTOLIC BLOOD PRESSURE: 144 MMHG

## 2022-04-15 DIAGNOSIS — Z30.42 ENCOUNTER FOR SURVEILLANCE OF INJECTABLE CONTRACEPTIVE: Primary | ICD-10-CM

## 2022-04-15 PROCEDURE — 99207 PR NO CHARGE NURSE ONLY: CPT

## 2022-04-15 NOTE — NURSING NOTE
Clinic Administered Medication Documentation          Depo Provera Documentation    URINE HCG: not indicated    Depo-Provera Standing Order inclusion/exclusion criteria reviewed.   Patient meets: inclusion criteria     BP: Data Unavailable  LAST PAP/EXAM: No results found for: PAP    Prior to injection, verified patient identity using patient's name and date of birth. Medication was administered. Please see MAR and medication order for additional information.     Was entire vial of medication used? Yes  Vial/Syringe: Single dose vial  Expiration Date:  09302023    Patient instructed to remain in clinic for 15 minutes.  NEXT INJECTION DUE: 7/1/22 - 7/15/22      Name of provider who requested the medication administration: Charles Francis    Name of provider on site (faculty or community preceptor) at the time of performing the medication administration: Soledad,     Date of next administration: 7/1/22  Date of next office visit with provider to renew medication plan (must be seen annually): 7/1/22

## 2022-04-27 DIAGNOSIS — I10 BENIGN ESSENTIAL HYPERTENSION: ICD-10-CM

## 2022-04-27 RX ORDER — AMLODIPINE BESYLATE 10 MG/1
10 TABLET ORAL DAILY
Qty: 90 TABLET | Refills: 3 | Status: SHIPPED | OUTPATIENT
Start: 2022-04-27 | End: 2023-03-06

## 2022-06-27 ENCOUNTER — PATIENT OUTREACH (OUTPATIENT)
Dept: FAMILY MEDICINE | Facility: CLINIC | Age: 52
End: 2022-06-27

## 2022-06-27 ENCOUNTER — APPOINTMENT (OUTPATIENT)
Dept: FAMILY MEDICINE | Facility: CLINIC | Age: 52
End: 2022-06-27
Payer: COMMERCIAL

## 2022-06-27 NOTE — TELEPHONE ENCOUNTER
Called pt minutes before her visit to see if I could get her roomed early and pt did not answer. Attempted 3 more times no answer. I LVMTCB to reschedule phone visit with Dr. Francis.

## 2022-08-15 DIAGNOSIS — I10 BENIGN ESSENTIAL HYPERTENSION: ICD-10-CM

## 2022-08-15 RX ORDER — HYDROCHLOROTHIAZIDE 25 MG/1
25 TABLET ORAL DAILY
Qty: 30 TABLET | Refills: 0 | Status: SHIPPED | OUTPATIENT
Start: 2022-08-15 | End: 2022-12-28

## 2022-08-15 NOTE — TELEPHONE ENCOUNTER
Approved 1 month supply of 25 mg hydrochlorothiazide - blue team please call to schedule blood pressure follow up for labs so that we can continue to safely prescribe this.

## 2022-10-06 ENCOUNTER — OFFICE VISIT (OUTPATIENT)
Dept: FAMILY MEDICINE | Facility: CLINIC | Age: 52
End: 2022-10-06
Payer: COMMERCIAL

## 2022-10-06 VITALS
TEMPERATURE: 98 F | DIASTOLIC BLOOD PRESSURE: 76 MMHG | BODY MASS INDEX: 24.75 KG/M2 | RESPIRATION RATE: 18 BRPM | OXYGEN SATURATION: 100 % | HEIGHT: 66 IN | WEIGHT: 154 LBS | SYSTOLIC BLOOD PRESSURE: 118 MMHG | HEART RATE: 77 BPM

## 2022-10-06 DIAGNOSIS — Z12.4 CERVICAL CANCER SCREENING: ICD-10-CM

## 2022-10-06 DIAGNOSIS — Z30.42 DEPO-PROVERA CONTRACEPTIVE STATUS: Primary | ICD-10-CM

## 2022-10-06 DIAGNOSIS — Z11.59 NEED FOR HEPATITIS C SCREENING TEST: ICD-10-CM

## 2022-10-06 DIAGNOSIS — I10 BENIGN ESSENTIAL HYPERTENSION: ICD-10-CM

## 2022-10-06 DIAGNOSIS — Z78.0 MENOPAUSE PRESENT: ICD-10-CM

## 2022-10-06 LAB
ANION GAP SERPL CALCULATED.3IONS-SCNC: 16 MMOL/L (ref 3–14)
BUN SERPL-MCNC: 13 MG/DL (ref 7–30)
CALCIUM SERPL-MCNC: 9.8 MG/DL (ref 8.5–10.1)
CHLORIDE BLD-SCNC: 95 MMOL/L (ref 94–109)
CO2 SERPL-SCNC: 33 MMOL/L (ref 20–32)
CREAT SERPL-MCNC: 1.1 MG/DL (ref 0.52–1.04)
FSH SERPL IRP2-ACNC: 90.9 MIU/ML
GFR SERPL CREATININE-BSD FRML MDRD: 60 ML/MIN/1.73M2
GLUCOSE BLD-MCNC: 97 MG/DL (ref 70–99)
HCG UR QL: NEGATIVE
POTASSIUM BLD-SCNC: 3.7 MMOL/L (ref 3.4–5.3)
SODIUM SERPL-SCNC: 144 MMOL/L (ref 133–144)

## 2022-10-06 PROCEDURE — 81025 URINE PREGNANCY TEST: CPT

## 2022-10-06 PROCEDURE — 83001 ASSAY OF GONADOTROPIN (FSH): CPT

## 2022-10-06 PROCEDURE — 96372 THER/PROPH/DIAG INJ SC/IM: CPT

## 2022-10-06 PROCEDURE — 80048 BASIC METABOLIC PNL TOTAL CA: CPT

## 2022-10-06 PROCEDURE — 36415 COLL VENOUS BLD VENIPUNCTURE: CPT

## 2022-10-06 PROCEDURE — 99213 OFFICE O/P EST LOW 20 MIN: CPT | Mod: 25

## 2022-10-06 PROCEDURE — 86803 HEPATITIS C AB TEST: CPT

## 2022-10-06 RX ORDER — MEDROXYPROGESTERONE ACETATE 150 MG/ML
150 INJECTION, SUSPENSION INTRAMUSCULAR
Status: DISCONTINUED | OUTPATIENT
Start: 2022-10-06 | End: 2023-06-26

## 2022-10-06 RX ADMIN — MEDROXYPROGESTERONE ACETATE 150 MG: 150 INJECTION, SUSPENSION INTRAMUSCULAR at 08:54

## 2022-10-06 NOTE — PROGRESS NOTES
"Assessment & Plan     Depo-Provera contraceptive status  Menopause  Depo-provera administered today. Discussed how she may be going through menopause due to her age and her symptoms - FSH consistent with this at 90 (elevated). Discussed via telephone. No need for ongoing depo-provera injections for birth control unless she is getting regular periods after this dose wears off in a few months.     Benign essential hypertension  Refills provided of amlodipine 10 mg, lisinopril 5 mg, hydrochlorothiazide 25 mg. Blood pressure very well-controlled today (118/76). BMP significant for creatinine bump 0.8 --> 1.1. Normal electrolytes. These medications are not new. Will have her return for recheck of BMP in 1 month.     Need for hepatitis C screening test  Negative.    Cervical cancer screening  She will return in 1 month for pap.    Return to clinic in 1-2 months to complete pap smear and repeat BMP to evaluate kidney function.    Pari Marsh MD PGY-2  [facility]    Precepted with: Dr. Marrero.      Mike Sinha is a 52 year old presenting for the following health issues:  Follow Up (Depo and BP )    Blood pressure: On amlodipine, lisinopril, hydrochlorothiazide. No issues taking her meds. She is very happy with her blood pressure today.     Depo: Overdue today, last was in April 2022. No issues with Depo-Provera and wants to continue. States she typically gets bleeding if she waits too long between doses, but hasn't gotten that this time. Does feel like she has had hot flashes.    Pap: ASCUS 3/2021 - due for repeat pap 3/2022. Patient wishes to hold off today but will return for this.        Objective    /76   Pulse 77   Temp 98  F (36.7  C)   Resp 18   Ht 1.676 m (5' 6\")   Wt 69.9 kg (154 lb)   SpO2 100%   BMI 24.86 kg/m    Body mass index is 24.86 kg/m .  GEN: Patient sitting comfortably in no acute distress.  HEEN: Head is atraumatic, normocephalic, eyes anicteric, " mucous membranes moist.  CV: Regular rate and rhythm without obvious murmurs.  PULM: Clear to auscultation bilaterally without wheezing or rales.  ABD: Soft, nontender, bowel sounds present.  NEURO: Alert and oriented x3.  No focal motor abnormalities.  Face symmetric.  PSYCH: Appropriate affect.  SKIN: No rashes, bruising, or other lesions.

## 2022-10-06 NOTE — NURSING NOTE
Clinic Administered Medication Documentation    Administrations This Visit     medroxyPROGESTERone (DEPO-PROVERA) injection 150 mg     Admin Date  10/06/2022 Action  Given Dose  150 mg Route  Intramuscular Site  Right Gluteus Rich Administered By  Kelly Romero    Ordering Provider: Do Marrero MD    Patient Supplied?: No                  Depo Provera Documentation    URINE HCG: negative    Depo-Provera Standing Order inclusion/exclusion criteria reviewed.   Patient meets: inclusion criteria     BP: 118/76  LAST PAP/EXAM: No results found for: PAP    Prior to injection, verified patient identity using patient's name and date of birth. Medication was administered. Please see MAR and medication order for additional information.     Was entire vial of medication used? Yes  Vial/Syringe: Single dose vial  Expiration Date:  4/2024    Patient instructed to remain in clinic for 15 minutes.  NEXT INJECTION DUE: 12/22/22 - 1/5/23      Name of provider who requested the medication administration:   Name of provider on site (faculty or community preceptor) at the time of performing the medication administration:     Date of next administration: Dec 22- Jan 5  Date of next office visit with provider to renew medication plan (must be seen annually): 1/26/2022

## 2022-10-07 LAB — HCV AB SERPL QL IA: NONREACTIVE

## 2022-10-07 NOTE — RESULT ENCOUNTER NOTE
I called patient with results. FSH level indicates menopausal status - no need for further depo-provera injections. If she is having vaginal bleeding in the next 3--6 months after her current injection wears off, we could look further into it.     Discussed elevated creatinine - not necessarily unexpected with her antihypertensives.     Recommended follow-up visit in 1-2 months for pap smear and repeat BMP.

## 2022-12-28 ENCOUNTER — OFFICE VISIT (OUTPATIENT)
Dept: FAMILY MEDICINE | Facility: CLINIC | Age: 52
End: 2022-12-28
Payer: COMMERCIAL

## 2022-12-28 VITALS
BODY MASS INDEX: 24.86 KG/M2 | OXYGEN SATURATION: 100 % | TEMPERATURE: 99.1 F | WEIGHT: 154 LBS | DIASTOLIC BLOOD PRESSURE: 85 MMHG | HEART RATE: 92 BPM | SYSTOLIC BLOOD PRESSURE: 133 MMHG

## 2022-12-28 DIAGNOSIS — R79.89 ELEVATED SERUM CREATININE: Primary | ICD-10-CM

## 2022-12-28 DIAGNOSIS — Z12.4 CERVICAL CANCER SCREENING: ICD-10-CM

## 2022-12-28 DIAGNOSIS — I10 BENIGN ESSENTIAL HYPERTENSION: ICD-10-CM

## 2022-12-28 LAB
ALBUMIN UR-MCNC: 30 MG/DL
ANION GAP SERPL CALCULATED.3IONS-SCNC: 11 MMOL/L (ref 7–15)
APPEARANCE UR: CLEAR
BACTERIA #/AREA URNS HPF: ABNORMAL /HPF
BILIRUB UR QL STRIP: NEGATIVE
BUN SERPL-MCNC: 11.7 MG/DL (ref 6–20)
CALCIUM SERPL-MCNC: 9.6 MG/DL (ref 8.6–10)
CHLORIDE SERPL-SCNC: 104 MMOL/L (ref 98–107)
COLOR UR AUTO: YELLOW
CREAT SERPL-MCNC: 0.81 MG/DL (ref 0.51–0.95)
DEPRECATED HCO3 PLAS-SCNC: 26 MMOL/L (ref 22–29)
GFR SERPL CREATININE-BSD FRML MDRD: 86 ML/MIN/1.73M2
GLUCOSE SERPL-MCNC: 86 MG/DL (ref 70–99)
GLUCOSE UR STRIP-MCNC: NEGATIVE MG/DL
HGB UR QL STRIP: ABNORMAL
KETONES UR STRIP-MCNC: ABNORMAL MG/DL
LEUKOCYTE ESTERASE UR QL STRIP: ABNORMAL
NITRATE UR QL: NEGATIVE
PH UR STRIP: 5.5 [PH] (ref 5–7)
POTASSIUM SERPL-SCNC: 3.8 MMOL/L (ref 3.4–5.3)
RBC #/AREA URNS AUTO: ABNORMAL /HPF
SODIUM SERPL-SCNC: 141 MMOL/L (ref 136–145)
SP GR UR STRIP: 1.02 (ref 1–1.03)
SQUAMOUS #/AREA URNS AUTO: ABNORMAL /LPF
UROBILINOGEN UR STRIP-ACNC: 1 E.U./DL
WBC #/AREA URNS AUTO: ABNORMAL /HPF

## 2022-12-28 PROCEDURE — 96372 THER/PROPH/DIAG INJ SC/IM: CPT | Performed by: FAMILY MEDICINE

## 2022-12-28 PROCEDURE — 99214 OFFICE O/P EST MOD 30 MIN: CPT | Mod: 25 | Performed by: STUDENT IN AN ORGANIZED HEALTH CARE EDUCATION/TRAINING PROGRAM

## 2022-12-28 PROCEDURE — 87624 HPV HI-RISK TYP POOLED RSLT: CPT | Performed by: STUDENT IN AN ORGANIZED HEALTH CARE EDUCATION/TRAINING PROGRAM

## 2022-12-28 PROCEDURE — 80048 BASIC METABOLIC PNL TOTAL CA: CPT | Performed by: STUDENT IN AN ORGANIZED HEALTH CARE EDUCATION/TRAINING PROGRAM

## 2022-12-28 PROCEDURE — G0145 SCR C/V CYTO,THINLAYER,RESCR: HCPCS | Performed by: STUDENT IN AN ORGANIZED HEALTH CARE EDUCATION/TRAINING PROGRAM

## 2022-12-28 PROCEDURE — 36415 COLL VENOUS BLD VENIPUNCTURE: CPT | Performed by: STUDENT IN AN ORGANIZED HEALTH CARE EDUCATION/TRAINING PROGRAM

## 2022-12-28 PROCEDURE — 81001 URINALYSIS AUTO W/SCOPE: CPT | Performed by: STUDENT IN AN ORGANIZED HEALTH CARE EDUCATION/TRAINING PROGRAM

## 2022-12-28 RX ORDER — HYDROCHLOROTHIAZIDE 25 MG/1
25 TABLET ORAL DAILY
Qty: 90 TABLET | Refills: 1 | Status: SHIPPED | OUTPATIENT
Start: 2022-12-28 | End: 2023-03-06

## 2022-12-28 RX ADMIN — MEDROXYPROGESTERONE ACETATE 150 MG: 150 INJECTION, SUSPENSION INTRAMUSCULAR at 11:10

## 2022-12-28 NOTE — NURSING NOTE
Clinic Administered Medication Documentation    Administrations This Visit     medroxyPROGESTERone (DEPO-PROVERA) injection 150 mg     Admin Date  12/28/2022 Action  Given Dose  150 mg Route  Intramuscular Site   Administered By  Jannet Baez CMA    Ordering Provider: Do Marrero MD    Patient Supplied?: No                  Depo Provera Documentation    URINE HCG: not indicated    Depo-Provera Standing Order inclusion/exclusion criteria reviewed.   Patient meets: inclusion criteria     BP: 133/85  LAST PAP/EXAM: No results found for: PAP    Prior to injection, verified patient identity using patient's name and date of birth. Medication was administered. Please see MAR and medication order for additional information.     Was entire vial of medication used? Yes  Vial/Syringe: Single dose vial  Expiration Date:  04/24    Patient instructed to remain in clinic for 15 minutes.  NEXT INJECTION DUE: 3/15/23 - 3/29/23      Name of provider who requested the medication administration:   Name of provider on site (faculty or community preceptor) at the time of performing the medication administration:     Date of next administration: 03/14/23-03/29/23  Date of next office visit with provider to renew medication plan (must be seen annually): 01/26/22

## 2022-12-28 NOTE — LETTER
January 6, 2023      Bharath Forbes  [address] [place] [address]        Dear Ms.Andrei,    We are writing to inform you of your test results.    Your test results fall within the expected range(s) or remain unchanged from previous results.  Please continue with current treatment plan.The result of your recent labwork has returned. Your PAP showed normal cells but the HPV virus is still present and given your PAP history a colposcopy is recommended. This is something we can talk about more at your appointment on 01/09/23.        Resulted Orders   UA reflex to Microscopic   Result Value Ref Range    Color Urine Yellow Colorless, Straw, Light Yellow, Yellow    Appearance Urine Clear Clear    Glucose Urine Negative Negative mg/dL    Bilirubin Urine Negative Negative    Ketones Urine Trace (A) Negative mg/dL    Specific Gravity Urine 1.025 1.003 - 1.035    Blood Urine Moderate (A) Negative    pH Urine 5.5 5.0 - 7.0    Protein Albumin Urine 30 (A) Negative mg/dL    Urobilinogen Urine 1.0 0.2, 1.0 E.U./dL    Nitrite Urine Negative Negative    Leukocyte Esterase Urine Trace (A) Negative   Basic metabolic panel   Result Value Ref Range    Sodium 141 136 - 145 mmol/L    Potassium 3.8 3.4 - 5.3 mmol/L    Chloride 104 98 - 107 mmol/L    Carbon Dioxide (CO2) 26 22 - 29 mmol/L    Anion Gap 11 7 - 15 mmol/L    Urea Nitrogen 11.7 6.0 - 20.0 mg/dL    Creatinine 0.81 0.51 - 0.95 mg/dL    Calcium 9.6 8.6 - 10.0 mg/dL    Glucose 86 70 - 99 mg/dL    GFR Estimate 86 >60 mL/min/1.73m2      Comment:      Effective December 21, 2021 eGFRcr in adults is calculated using the 2021 CKD-EPI creatinine equation which includes age and gender (Michael et al., NEJM, DOI: 10.1056/FITWpf8768732)   Gynecologic Cytology (PAP)   Result Value Ref Range    Interpretation        Negative for Intraepithelial Lesion or Malignancy (NILM)    Comment         Papanicolaou Test Limitations:  Cervical cytology is a screening test with limited  sensitivity, and regular screening is critical for cancer prevention.  Pap tests are primarily effective for the diagnosis/prevention of squamous cell carcinoma, not adenocarcinoma or other cancers.        Specimen Adequacy       Satisfactory for evaluation, endocervical/transformation zone component absent    Clinical Information       none      Reflex Testing Yes regardless of result     Previous Abnormal?       Yes      Previous Abnormal Diagnosis       ASCUS      Performing Labs       The technical component of this testing was completed at [facility]     Urine Microscopic Exam   Result Value Ref Range    Bacteria Urine Moderate (A) None Seen /HPF    RBC Urine 0-2 0-2 /HPF /HPF    WBC Urine 0-5 0-5 /HPF /HPF    Squamous Epithelials Urine Few (A) None Seen /LPF   HPV High Risk Types DNA Cervical   Result Value Ref Range    Other HR HPV Positive (A) Negative    HPV16 DNA Negative Negative    HPV18 DNA Negative Negative    FINAL DIAGNOSIS       This patient's sample is positive for other HR HPV DNA (types 31, 33, 35, 39, 45, 51, 52, 56, 58, 59, 66 or 68), not HPV 16 or HPV 18 DNA. This result requires clinical correlation with concurrent cytology findings.      This test was developed and its performance characteristics determined by the [facility]. It has not been cleared or approved by the FDA. The laboratory is regulated under CLIA as qualified to perform high-complexity testing. This test is used for clinical purposes. It should not be regarded as investigational or for research.    METHODOLOGY: The Roche Kevin [id number] system uses automated extraction, simultaneous amplification of HPV (L1 region) and beta-globin, followed by real time detection of fluorescent labeled HPV and beta globin using specific oligonucleotide probes. The test specifically identifies types HPV 16 DNA and HPV 18 DNA  while concurrently detecting the rest of the high risk types (31, 33, 35, 39, 45, 51, 52, 56, 58, 59, 66 or 68).    COMMENTS: This test is not intended for use as a screening device for woman under age 30 with normal cervical cytology. Results should be correlated with cytologic and histologic findings. Close clinical followup is recommended.           If you have any questions or concerns, please call the clinic at the number listed above.       Sincerely,      Karly Mcgee MD

## 2022-12-28 NOTE — PROGRESS NOTES
"Assessment and Plan   (R79.89) Elevated serum creatinine  (primary encounter diagnosis)  Comment: Patient with creatinine of 1.1 in October, unknown cause of elevation at that time. Patient denied NSAID use, being dehydrated or recent illness at that time. Does have history of elevated blood pressure, otherwise healthy. Elevated creatinine could be due to transient cause, will recheck to ensure it is not increasing.   Plan: Repeat BMP today and check UA    (I10) Benign essential hypertension  Comment: Well controlled today, needs refill.   Plan: hydrochlorothiazide (HYDRODIURIL) 25 MG tablet,    (Z12.4) Cervical cancer screening  Comment: Had a PAP in March of 2021, was found to have ASCUS with positive HPV \"other type\". At that time recommended 1 year follow-up for repeat PAP. Has not had PAP this year so will do today.   Plan: Gynecologic Cytology (PAP)    Options for treatment and follow-up care were reviewed with the patient and/or guardian. Bharath Forbes and/or guardian engaged in the decision making process and verbalized understanding of the options discussed and agreed with the final plan.    Karly Mcgee MD      Precepted today with: Geoff Lawrence MD           HPI:       Bharath Forbes is a 52 year old  female with a significant past medical history of HTN who presents for the following below:    Medication refill  - Needs refill of hydrochlorothiazide  - BP well controlled today     Review labs elevated creatinine   - Been feeling well lately  - BMP checked in October and Cr elevated to 1.1 at that time  - Never been elevated before  - History of HTN, has been on treatment for many years  - Family history of kidney problems     PAP  - In March had PAP with ASCUS with high risk HPV   - Due for pap today          PMHX:     Patient Active Problem List   Diagnosis     Essential hypertension     Encounter for surveillance of injectable contraceptive     ASCUS with positive high risk HPV " cervical       Current Outpatient Medications   Medication Sig Dispense Refill     amLODIPine (NORVASC) 10 MG tablet Take 1 tablet (10 mg) by mouth daily 90 tablet 3     hydrochlorothiazide (HYDRODIURIL) 25 MG tablet Take 1 tablet (25 mg) by mouth daily 30 tablet 0     lisinopril (ZESTRIL) 5 MG tablet Take 1 tablet (5 mg) by mouth daily 90 tablet 3       Social History     Tobacco Use     Smoking status: Former     Packs/day: 0.25     Years: 1.00     Pack years: 0.25     Types: Cigarettes     Smokeless tobacco: Former     Quit date: 2/26/2010   Substance Use Topics     Alcohol use: Yes     Alcohol/week: 0.0 standard drinks     Comment: Ocassionally     Drug use: Yes     Types: Marijuana          Allergies   Allergen Reactions     Nka [No Known Allergies]        No results found for this or any previous visit (from the past 24 hour(s)).         Review of Systems:     10 point ROS negative except for what is noted in HPI          Physical Exam:     Vitals:    12/28/22 1055 12/28/22 1057   BP: (!) 149/89 133/85   Pulse: 92    Temp: 99.1  F (37.3  C)    TempSrc: Oral    SpO2: 100%    Weight: 69.9 kg (154 lb)      Body mass index is 24.86 kg/m .      GENERAL APPEARANCE: healthy, alert and no distress,  EYES: Eyes grossly normal to inspection  MS: extremities normal- no gross deformities noted  SKIN: no suspicious lesions or rashes  NEURO: Normal strength and tone, sensory exam grossly normal, mentation appears intact and speech normal  PSYCH: mood and affect normal/bright

## 2022-12-28 NOTE — PROGRESS NOTES
Preceptor Attestation:   Patient seen, evaluated and discussed with the resident. I have verified the content of the note, which accurately reflects my assessment of the patient and the plan of care.  Supervising Physician:Geoff Lawrence MD  [facility]

## 2022-12-30 LAB
BKR LAB AP GYN ADEQUACY: NORMAL
BKR LAB AP GYN INTERPRETATION: NORMAL
BKR LAB AP HPV REFLEX: NORMAL
BKR LAB AP PREVIOUS ABNL DX: NORMAL
BKR LAB AP PREVIOUS ABNORMAL: NORMAL
PATH REPORT.COMMENTS IMP SPEC: NORMAL
PATH REPORT.COMMENTS IMP SPEC: NORMAL
PATH REPORT.RELEVANT HX SPEC: NORMAL

## 2023-01-02 LAB
HUMAN PAPILLOMA VIRUS 16 DNA: NEGATIVE
HUMAN PAPILLOMA VIRUS 18 DNA: NEGATIVE
HUMAN PAPILLOMA VIRUS FINAL DIAGNOSIS: ABNORMAL
HUMAN PAPILLOMA VIRUS OTHER HR: POSITIVE

## 2023-02-10 ENCOUNTER — OFFICE VISIT (OUTPATIENT)
Dept: FAMILY MEDICINE | Facility: CLINIC | Age: 53
End: 2023-02-10
Payer: COMMERCIAL

## 2023-02-10 VITALS
HEART RATE: 99 BPM | DIASTOLIC BLOOD PRESSURE: 90 MMHG | WEIGHT: 162 LBS | OXYGEN SATURATION: 100 % | SYSTOLIC BLOOD PRESSURE: 148 MMHG | BODY MASS INDEX: 26.15 KG/M2 | RESPIRATION RATE: 20 BRPM

## 2023-02-10 DIAGNOSIS — R87.810 PAP SMEAR OF CERVIX SHOWS HIGH RISK HPV PRESENT: Primary | ICD-10-CM

## 2023-02-10 DIAGNOSIS — Z12.31 VISIT FOR SCREENING MAMMOGRAM: ICD-10-CM

## 2023-02-10 PROBLEM — R87.610 ASCUS WITH POSITIVE HIGH RISK HPV CERVICAL: Status: ACTIVE | Noted: 2021-03-16

## 2023-02-10 PROCEDURE — 99213 OFFICE O/P EST LOW 20 MIN: CPT | Mod: GC | Performed by: STUDENT IN AN ORGANIZED HEALTH CARE EDUCATION/TRAINING PROGRAM

## 2023-02-10 NOTE — PROGRESS NOTES
Assessment and Plan     (R87.810) Pap smear of cervix shows high risk HPV present  (primary encounter diagnosis)  Comment: 2021 PAP with ASCUS and positive other type HPV and repeat PAP in 2022 with normal cells and still positive other type HPV. Per guidelines recommend colposcopy, patient wanted to touch base about this as it makes her very anxious, she often has anxiety around coming to doctor. Provided education about colposcopy, early detection and prevention, patient agreed to proceed with colp.   Plan: Return for colposcopy    (Z12.31) Visit for screening mammogram  Comment: Ok to order.   Plan: MA SCREENING DIGITAL BILAT        Options for treatment and follow-up care were reviewed with the patient and/or guardian. Bharath Forbes and/or guardian engaged in the decision making process and verbalized understanding of the options discussed and agreed with the final plan.    Karly Mcgee MD      Precepted today with: MD Anjali            HPI:       Bharath Forbes is a 52 year old  female with a significant past medical history of HTN and abnormal PAP who presents for the following below:    PAP follow-up   - Last year had ASCUS with other positive HPV   - This year PAP with normal cells and still positive with other HPV  - Recommended patient have colposcopy due to results and patient here to discuss that procedure  - She is nervous about the procedure, BP is elevated today but patient is very anxious         PMHX:     Patient Active Problem List   Diagnosis     Essential hypertension     Encounter for surveillance of injectable contraceptive     ASCUS with positive high risk HPV cervical       Current Outpatient Medications   Medication Sig Dispense Refill     amLODIPine (NORVASC) 10 MG tablet Take 1 tablet (10 mg) by mouth daily 90 tablet 3     hydrochlorothiazide (HYDRODIURIL) 25 MG tablet Take 1 tablet (25 mg) by mouth daily 90 tablet 1     lisinopril (ZESTRIL) 5 MG tablet Take 1 tablet (5 mg)  by mouth daily 90 tablet 3       Social History     Tobacco Use     Smoking status: Former     Packs/day: 0.25     Years: 1.00     Pack years: 0.25     Types: Cigarettes     Smokeless tobacco: Former     Quit date: 2/26/2010   Substance Use Topics     Alcohol use: Yes     Alcohol/week: 0.0 standard drinks     Comment: Ocassionally     Drug use: Yes     Types: Marijuana          Allergies   Allergen Reactions     Nka [No Known Allergies]        No results found for this or any previous visit (from the past 24 hour(s)).         Review of Systems:     10 point ROS negative except for what is noted in HPI          Physical Exam:     Vitals:    02/10/23 1546 02/10/23 1547   BP: (!) 193/81 (!) 184/102   Pulse: 99    Resp: 20    SpO2: 100%    Weight: 73.5 kg (162 lb)      Body mass index is 26.15 kg/m .      GENERAL APPEARANCE: healthy, alert and no distress,  EYES: Eyes grossly normal to inspection  SKIN: no suspicious lesions or rashes  NEURO: Normal strength and tone, sensory exam grossly normal, mentation appears intact and speech normal  PSYCH: mood and affect normal

## 2023-02-10 NOTE — PROGRESS NOTES
Preceptor Attestation:  Patient's case reviewed and discussed with the resident, Karly Mcgee MD, and I personally evaluated the patient. I agree with written assessment and plan of care.    Supervising Physician:  Windy Vines MD   [facility]

## 2023-02-14 ENCOUNTER — HOSPITAL ENCOUNTER (OUTPATIENT)
Dept: MAMMOGRAPHY | Facility: CLINIC | Age: 53
Discharge: HOME | End: 2023-02-14
Attending: STUDENT IN AN ORGANIZED HEALTH CARE EDUCATION/TRAINING PROGRAM | Admitting: STUDENT IN AN ORGANIZED HEALTH CARE EDUCATION/TRAINING PROGRAM
Payer: COMMERCIAL

## 2023-02-14 DIAGNOSIS — Z12.31 VISIT FOR SCREENING MAMMOGRAM: ICD-10-CM

## 2023-02-14 PROCEDURE — 77067 SCR MAMMO BI INCL CAD: CPT

## 2023-03-06 ENCOUNTER — OFFICE VISIT (OUTPATIENT)
Dept: FAMILY MEDICINE | Facility: CLINIC | Age: 53
End: 2023-03-06
Payer: COMMERCIAL

## 2023-03-06 VITALS
OXYGEN SATURATION: 99 % | WEIGHT: 159 LBS | TEMPERATURE: 99.3 F | RESPIRATION RATE: 20 BRPM | HEIGHT: 66 IN | DIASTOLIC BLOOD PRESSURE: 70 MMHG | HEART RATE: 96 BPM | SYSTOLIC BLOOD PRESSURE: 193 MMHG | BODY MASS INDEX: 25.55 KG/M2

## 2023-03-06 DIAGNOSIS — I10 BENIGN ESSENTIAL HYPERTENSION: ICD-10-CM

## 2023-03-06 DIAGNOSIS — R87.810 CERVICAL HIGH RISK HUMAN PAPILLOMAVIRUS (HPV) DNA TEST POSITIVE: ICD-10-CM

## 2023-03-06 DIAGNOSIS — I10 ESSENTIAL HYPERTENSION: ICD-10-CM

## 2023-03-06 DIAGNOSIS — R87.810 PAP SMEAR OF CERVIX SHOWS HIGH RISK HPV PRESENT: Primary | ICD-10-CM

## 2023-03-06 LAB — HCG UR QL: NEGATIVE

## 2023-03-06 PROCEDURE — 57456 ENDOCERV CURETTAGE W/SCOPE: CPT | Performed by: STUDENT IN AN ORGANIZED HEALTH CARE EDUCATION/TRAINING PROGRAM

## 2023-03-06 PROCEDURE — 88342 IMHCHEM/IMCYTCHM 1ST ANTB: CPT | Performed by: PATHOLOGY

## 2023-03-06 PROCEDURE — 88305 TISSUE EXAM BY PATHOLOGIST: CPT | Performed by: PATHOLOGY

## 2023-03-06 PROCEDURE — 81025 URINE PREGNANCY TEST: CPT | Performed by: STUDENT IN AN ORGANIZED HEALTH CARE EDUCATION/TRAINING PROGRAM

## 2023-03-06 RX ORDER — AMLODIPINE BESYLATE 10 MG/1
10 TABLET ORAL DAILY
Qty: 90 TABLET | Refills: 3 | Status: SHIPPED | OUTPATIENT
Start: 2023-03-06 | End: 2023-12-29

## 2023-03-06 RX ORDER — HYDROCHLOROTHIAZIDE 25 MG/1
25 TABLET ORAL DAILY
Qty: 90 TABLET | Refills: 1 | Status: SHIPPED | OUTPATIENT
Start: 2023-03-06 | End: 2023-06-16

## 2023-03-06 RX ORDER — LISINOPRIL 5 MG/1
5 TABLET ORAL DAILY
Qty: 90 TABLET | Refills: 3 | Status: SHIPPED | OUTPATIENT
Start: 2023-03-06 | End: 2023-08-14 | Stop reason: ALTCHOICE

## 2023-03-06 NOTE — PROGRESS NOTES
Referring Physician:  Everardo  Reason for Colposcopy:  High Risk HPV[cv  [phone] (home)   There is no work phone number on file.  Marital status:    Abnormal bleeding?No  Abnormal discharge? No  Age at first intercourse:  N/a  Types of contraception (lifetime):  DEPO  Smoker:  No  Allergies   Allergen Reactions     Nka [No Known Allergies]      Current Outpatient Medications   Medication Sig Dispense Refill     amLODIPine (NORVASC) 10 MG tablet Take 1 tablet (10 mg) by mouth daily 90 tablet 3     hydrochlorothiazide (HYDRODIURIL) 25 MG tablet Take 1 tablet (25 mg) by mouth daily 90 tablet 1     lisinopril (ZESTRIL) 5 MG tablet Take 1 tablet (5 mg) by mouth daily 90 tablet 3         HX of previous cryocautery?  NO  Previous Abnormal Paps?  NO  Personal Hx of Cancer? NO  Family Hx of Cancer?NO  DWIGHT Exposure?  NO  Hx of sexual abuse? No  Previous Hysterectomy?  No  Other Gyn Surgery? NO    Hx of Veneral Disease?NO  Do you desire testing for any of these diseases?  No  HX of genital warts? No  Partner(s) with warts?: No  Visible warts now?  No      Colposcopy Procedure Note    History:  Bharath Forbes is a patient of Pari Leblanc that  presents for colposcopy for High risk HPV.  STI history: Negative  Contraception  depoprovera  Smoking?  No  Taking folate?   No  ASA in last week? No  NSAID taken today? Yes    Consent: Affirmation of informed consent signed and scanned into medical record. Risks, benefits and alternatives discussed. Patient's questions were elicited and answered.  Procedure safety checklist was completed:  Yes  Time Out (Pause for the Cause) completed: Yes    Labs:   UPT:  Negative    Procedure:  Patient positioned for colposcopy. Acetic acid applied. Lugol's applied.   SCJ seen entirely? Yes  [place] was satisfactory with ECC  cervix swabbed with Lugol's solution, endocervical speculum placed, SCJ visualized 360 degrees without lesions, no biopsies taken and endocervical  curettage performed  Bleeding controlled with: with simple pressure  EBL: <3cc    Findings:   Vagina   normal without visible lesions    Vulva  normal mucosa without lesions    Cervix  Color:  Zero (0) Points - Shiny, snow white.  Transient, indistinct acetowhite, semitransparent.  Margin: Zero (0) Points - Condylomatous or micropapillary contour.  Indistinct borders.   Vessels: Zero (0) Points - Fine, punctuation or fine mosaic.  Uniform, fine caliber, nondilated capillary loops.  Narrow intercapillary distance.  Lugol's: Zero (0) Points - Positive iodine uptake, producing a jeff brown color OR Negative iodine uptake (mustard yellow) of a jeff brown color lesion recognized as low grade by above criteria.  Total Pts: 0      (0-2 mild, 3-5 mod, 6-8 severe)     Colposcopic Impression: Dysplasia Severe    Tolerance:   Patient tolerated procedure well    Plan:  Follow up in 2 weeks for biopsy results.  Discharge instructions discussed including RTC or call if bleeding >1pph, fever, abdominal pain or foul smelling discharge.       Resident: Estella Ingram MD  Faculty: Estella Ingram MD present for and supervised this entire procedure

## 2023-03-09 LAB
PATH REPORT.COMMENTS IMP SPEC: NORMAL
PATH REPORT.FINAL DX SPEC: NORMAL
PATH REPORT.GROSS SPEC: NORMAL
PATH REPORT.MICROSCOPIC SPEC OTHER STN: NORMAL
PATH REPORT.RELEVANT HX SPEC: NORMAL
PHOTO IMAGE: NORMAL

## 2023-04-05 NOTE — PATIENT INSTRUCTIONS

## 2023-06-16 ENCOUNTER — OFFICE VISIT (OUTPATIENT)
Dept: FAMILY MEDICINE | Facility: CLINIC | Age: 53
End: 2023-06-16
Payer: COMMERCIAL

## 2023-06-16 VITALS
WEIGHT: 150 LBS | TEMPERATURE: 98.3 F | DIASTOLIC BLOOD PRESSURE: 93 MMHG | BODY MASS INDEX: 23.54 KG/M2 | HEIGHT: 67 IN | SYSTOLIC BLOOD PRESSURE: 194 MMHG | OXYGEN SATURATION: 99 % | HEART RATE: 120 BPM

## 2023-06-16 DIAGNOSIS — R31.9 HEMATURIA, UNSPECIFIED TYPE: Primary | ICD-10-CM

## 2023-06-16 DIAGNOSIS — F41.1 GAD (GENERALIZED ANXIETY DISORDER): ICD-10-CM

## 2023-06-16 DIAGNOSIS — I10 BENIGN ESSENTIAL HYPERTENSION: ICD-10-CM

## 2023-06-16 LAB
ALBUMIN UR-MCNC: NEGATIVE MG/DL
APPEARANCE UR: ABNORMAL
BACTERIA #/AREA URNS HPF: ABNORMAL /HPF
BILIRUB UR QL STRIP: NEGATIVE
COLOR UR AUTO: ABNORMAL
GLUCOSE UR STRIP-MCNC: NEGATIVE MG/DL
HGB UR QL STRIP: ABNORMAL
KETONES UR STRIP-MCNC: NEGATIVE MG/DL
LEUKOCYTE ESTERASE UR QL STRIP: ABNORMAL
NITRATE UR QL: NEGATIVE
PH UR STRIP: 7 [PH] (ref 5–7)
RBC #/AREA URNS AUTO: ABNORMAL /HPF
SP GR UR STRIP: 1.01 (ref 1–1.03)
SQUAMOUS #/AREA URNS AUTO: ABNORMAL /LPF
UROBILINOGEN UR STRIP-ACNC: 0.2 E.U./DL
WBC #/AREA URNS AUTO: ABNORMAL /HPF

## 2023-06-16 PROCEDURE — 81001 URINALYSIS AUTO W/SCOPE: CPT | Performed by: STUDENT IN AN ORGANIZED HEALTH CARE EDUCATION/TRAINING PROGRAM

## 2023-06-16 PROCEDURE — 99214 OFFICE O/P EST MOD 30 MIN: CPT | Mod: GC | Performed by: STUDENT IN AN ORGANIZED HEALTH CARE EDUCATION/TRAINING PROGRAM

## 2023-06-16 RX ORDER — HYDROCHLOROTHIAZIDE 25 MG/1
25 TABLET ORAL DAILY
Qty: 90 TABLET | Refills: 1 | Status: SHIPPED | OUTPATIENT
Start: 2023-06-16 | End: 2023-08-14 | Stop reason: ALTCHOICE

## 2023-06-16 RX ORDER — ESCITALOPRAM OXALATE 10 MG/1
10 TABLET ORAL DAILY
Qty: 60 TABLET | Refills: 0 | Status: SHIPPED | OUTPATIENT
Start: 2023-06-16 | End: 2023-08-14

## 2023-06-16 NOTE — PROGRESS NOTES
Assessment and Plan     (R31.9) Hematuria, unspecified type  (primary encounter diagnosis)  Comment: Physical through work found hematuria, on review moderate blood was noted on UA from 5 months ago, and small amount of blood today, 0-2 RBCs per microscopic field on today's exam but 2 years ago was noted to have 2-5. Given multiple UAs with blood and microscopic exam from 2 years ago with 2-5 RBC will refer to urology for further workup. Patient denies tobacco use, no known family history.   Plan: Urine Macroscopic with reflex to Microscopic,         Urine Microscopic Exam, Adult Urology         Referral    (I10) Benign essential hypertension  Comment: Elevated BP today, has had elevated BP at last few visits, patient has not been taking her hydrochlorothiazide, she thought she was told to stop it, she has been taking lisinopril and amlodipine daily. She does have significant doctor anxiety which could be contributing to her elevated BP today. Will restart hydrochlorothiazide today. Return in two weeks and review home BP readings at that time. There is room to increase lisinopril  if needed.   Plan: hydrochlorothiazide (HYDRODIURIL) 25 MG tablet    (F41.1) [place] (generalized anxiety disorder)  Comment: Endorses having anxiety often, worrying about many different things. Has significant anxiety about coming to the doctor. GAD7 of 4, however patient is interested in medication to help with anxiety. Will start 10 mg Lexapro, counseled on side effects and time to effect.   Plan: escitalopram (LEXAPRO) 10 MG tablet    Options for treatment and follow-up care were reviewed with the patient and/or guardian. Bharath Forbes and/or guardian engaged in the decision making process and verbalized understanding of the options discussed and agreed with the final plan.    Karly Mcege MD      Precepted today with: Hector Renae MD           HPI:       Bharath Forbes is a 53 year old  female with a significant  past medical history of HTN who presents for the following below:    UA  - Physical yesterday through work, found blood in her urine   - Applying for  job at elementary school   - Does not get period anymore  - 5 months ago had blood in urine  - Denies tobacco use     HTN  - Elevated BP today  - Gets very anxious at doctors appointments   - Checks BP at home, 145s/80s, will go higher than that, will usually check when she feels her BP is elevated   - Took lisinopril and amlodipine this morning  - Has not been taking the hydrochlorothiazide, someone stopped it a while ago and then it was restarted   - Takes it everyday, will miss 1 dose in a week     Anxiety  - Worrying every day  - Worries about many things  - Feels anxious at home   - Trouble sleeping          PMHX:     Patient Active Problem List   Diagnosis     Essential hypertension     Encounter for surveillance of injectable contraceptive     Pap smear of cervix shows high risk HPV present-normal colp 3/10/23.  repeat cotesting 3/24       Current Outpatient Medications   Medication Sig Dispense Refill     amLODIPine (NORVASC) 10 MG tablet Take 1 tablet (10 mg) by mouth daily 90 tablet 3     hydrochlorothiazide (HYDRODIURIL) 25 MG tablet Take 1 tablet (25 mg) by mouth daily 90 tablet 1     lisinopril (ZESTRIL) 5 MG tablet Take 1 tablet (5 mg) by mouth daily 90 tablet 3       Social History     Tobacco Use     Smoking status: Every Day     Types: Vaping Device     Smokeless tobacco: Former     Quit date: 2/26/2010   Substance Use Topics     Alcohol use: Yes     Alcohol/week: 0.0 standard drinks of alcohol     Comment: Ocassionally     Drug use: Yes     Types: Marijuana          Allergies   Allergen Reactions     Nka [No Known Allergies]        Results for orders placed or performed in visit on 06/16/23 (from the past 24 hour(s))   Urine Macroscopic with reflex to Microscopic   Result Value Ref Range    Color Urine Light Yellow Colorless, Straw, Light  "Yellow, Yellow    Appearance Urine Turbid (A) Clear    Glucose Urine Negative Negative mg/dL    Bilirubin Urine Negative Negative    Ketones Urine Negative Negative mg/dL    Specific Gravity Urine 1.015 1.003 - 1.035    Blood Urine Small (A) Negative    pH Urine 7.0 5.0 - 7.0    Protein Albumin Urine Negative Negative mg/dL    Urobilinogen Urine 0.2 0.2, 1.0 E.U./dL    Nitrite Urine Negative Negative    Leukocyte Esterase Urine Moderate (A) Negative   Urine Microscopic Exam   Result Value Ref Range    Bacteria Urine None Seen None Seen /HPF    RBC Urine 0-2 0-2 /HPF /HPF    WBC Urine 0-5 0-5 /HPF /HPF    Squamous Epithelials Urine Few (A) None Seen /LPF            Review of Systems:     10 point ROS negative except for what is noted in HPI          Physical Exam:     Vitals:    06/16/23 1011   BP: (!) 194/93   Pulse: 120   Temp: 98.3  F (36.8  C)   TempSrc: Oral   SpO2: 99%   Weight: 68 kg (150 lb)   Height: 1.69 m (5' 6.54\")     Body mass index is 23.82 kg/m .      GENERAL APPEARANCE: healthy, alert and no distress,  EYES: Eyes grossly normal to inspection  MS: extremities normal- no gross deformities noted  SKIN: no suspicious lesions or rashes  NEURO: Normal strength and tone, sensory exam grossly normal, mentation appears intact and speech normal  PSYCH: mood and affect normal/bright    "

## 2023-06-26 ENCOUNTER — OFFICE VISIT (OUTPATIENT)
Dept: FAMILY MEDICINE | Facility: CLINIC | Age: 53
End: 2023-06-26
Payer: COMMERCIAL

## 2023-06-26 VITALS
WEIGHT: 145.75 LBS | DIASTOLIC BLOOD PRESSURE: 82 MMHG | BODY MASS INDEX: 23.42 KG/M2 | SYSTOLIC BLOOD PRESSURE: 116 MMHG | HEIGHT: 66 IN

## 2023-06-26 DIAGNOSIS — I10 BENIGN ESSENTIAL HYPERTENSION: Primary | ICD-10-CM

## 2023-06-26 DIAGNOSIS — Z30.42 DEPO-PROVERA CONTRACEPTIVE STATUS: ICD-10-CM

## 2023-06-26 PROCEDURE — 99213 OFFICE O/P EST LOW 20 MIN: CPT | Mod: GC | Performed by: STUDENT IN AN ORGANIZED HEALTH CARE EDUCATION/TRAINING PROGRAM

## 2023-06-26 NOTE — PROGRESS NOTES
Assessment and Plan     (I10) Benign essential hypertension  (primary encounter diagnosis)  Comment: BP within goal today while on 10 mg amlodipine, hydrochlorothiazide 25 mg and lisinopril 5 mg. Patient denies side effects, denies dizziness.   Plan: Continue on three drug regimen, if patient reaches a point where she is uncontrolled and maxed out on three agents would likely need work up of secondary causes of HTN but for now well controlled    (Z30.42) Depo-Provera contraceptive status  Comment: Longstanding use of Depo for contraception, patient uncertain if she has gone through menopause. She cannot recall her last period, although difficult to assess with her being on Depo.   Plan: Discussed with patient and will trial off depo and monitor what occurs with menstrual cycle, if patient truly is postmenopausal would not need contraception, however if period returns and patient is sexually active recommended she return to discuss contraception again.         Options for treatment and follow-up care were reviewed with the patient and/or guardian. Bharath Forbes and/or guardian engaged in the decision making process and verbalized understanding of the options discussed and agreed with the final plan.    Karly Mcgee MD      Precepted today with: Zehra Ingram MD           HPI:       Bharath Forbes is a 53 year old  female with a significant past medical history of HTN and anxiety who presents for the following below:    HTN  - Elevated at last visit, restarted hydrochlorothiazide along with lisinopril and amlodipine   - States she has been taking all her medications, has not missed any medications  - Has been feeling well, denies feeling any dizziness     Depo Shot  - Has been on the Depo shot for a long time  - Has not had her period for a long time, states she is unsure when her last period was  - She has not been sexually active this year          PMHX:     Patient Active Problem List   Diagnosis      "Essential hypertension     Encounter for surveillance of injectable contraceptive     Pap smear of cervix shows high risk HPV present-normal colp 3/10/23.  repeat cotesting 3/24       Current Outpatient Medications   Medication Sig Dispense Refill     amLODIPine (NORVASC) 10 MG tablet Take 1 tablet (10 mg) by mouth daily 90 tablet 3     escitalopram (LEXAPRO) 10 MG tablet Take 1 tablet (10 mg) by mouth daily 60 tablet 0     hydrochlorothiazide (HYDRODIURIL) 25 MG tablet Take 1 tablet (25 mg) by mouth daily 90 tablet 1     lisinopril (ZESTRIL) 5 MG tablet Take 1 tablet (5 mg) by mouth daily 90 tablet 3       Social History     Tobacco Use     Smoking status: Every Day     Types: Vaping Device     Smokeless tobacco: Former     Quit date: 2/26/2010   Substance Use Topics     Alcohol use: Yes     Alcohol/week: 0.0 standard drinks of alcohol     Comment: Ocassionally     Drug use: Yes     Types: Marijuana          Allergies   Allergen Reactions     Nka [No Known Allergies]        No results found for this or any previous visit (from the past 24 hour(s)).         Review of Systems:     10 point ROS negative except for what is noted in HPI          Physical Exam:     Vitals:    06/26/23 1017   BP: 116/82   Weight: 66.1 kg (145 lb 12 oz)   Height: 1.676 m (5' 6\")     Body mass index is 23.52 kg/m .      GENERAL APPEARANCE: healthy, alert and no distress,  EYES: Eyes grossly normal to inspection  MS: extremities normal- no gross deformities noted  SKIN: no suspicious lesions or rashes  PSYCH: mood and affect normal/bright    "

## 2023-06-28 NOTE — PROGRESS NOTES
Faculty Supervision of Residents   I have examined this patient and the medical care has been evaluated and discussed with the resident. See resident note outlining our discussion.      Estella Ingram MD

## 2023-08-14 ENCOUNTER — OFFICE VISIT (OUTPATIENT)
Dept: FAMILY MEDICINE | Facility: CLINIC | Age: 53
End: 2023-08-14
Payer: COMMERCIAL

## 2023-08-14 VITALS
BODY MASS INDEX: 22.82 KG/M2 | HEART RATE: 86 BPM | WEIGHT: 142 LBS | SYSTOLIC BLOOD PRESSURE: 154 MMHG | HEIGHT: 66 IN | OXYGEN SATURATION: 98 % | TEMPERATURE: 99.5 F | DIASTOLIC BLOOD PRESSURE: 89 MMHG

## 2023-08-14 DIAGNOSIS — I10 ESSENTIAL HYPERTENSION: ICD-10-CM

## 2023-08-14 DIAGNOSIS — R63.4 WEIGHT LOSS: ICD-10-CM

## 2023-08-14 DIAGNOSIS — R63.0 ANOREXIA: Primary | ICD-10-CM

## 2023-08-14 DIAGNOSIS — Z30.42 DEPO-PROVERA CONTRACEPTIVE STATUS: ICD-10-CM

## 2023-08-14 LAB — HCG UR QL: NEGATIVE

## 2023-08-14 PROCEDURE — 99214 OFFICE O/P EST MOD 30 MIN: CPT | Performed by: FAMILY MEDICINE

## 2023-08-14 PROCEDURE — 81025 URINE PREGNANCY TEST: CPT | Performed by: FAMILY MEDICINE

## 2023-08-14 RX ORDER — MIRTAZAPINE 15 MG/1
15 TABLET, FILM COATED ORAL AT BEDTIME
Qty: 30 TABLET | Refills: 0 | Status: SHIPPED | OUTPATIENT
Start: 2023-08-14 | End: 2023-08-28 | Stop reason: SINTOL

## 2023-08-14 RX ORDER — LISINOPRIL/HYDROCHLOROTHIAZIDE 10-12.5 MG
1 TABLET ORAL DAILY
Qty: 90 TABLET | Refills: 0 | Status: SHIPPED | OUTPATIENT
Start: 2023-08-14 | End: 2023-09-18 | Stop reason: ALTCHOICE

## 2023-08-14 NOTE — PATIENT INSTRUCTIONS
- Stop taking lisinopril and hydrochlorothiazide as individual medications.   - Start taking the combination pill lisinopril-hydrochlorothiazide one pill each morning. You can start today.  - Continue your amlodipine at bedtime.   - Keep checking your blood pressure once per day at least 20 minutes after taking your morning medications. Your numbers should be consistently less than 140/90.  - Start mirtazapine one tablet at bedtime to help with your appetite  - Use condoms with any sexual activity  - Return to clinic in 2 weeks to recheck your potassium levels and a hormone level to see if you've gone through menopause

## 2023-08-14 NOTE — PROGRESS NOTES
"  Assessment & Plan     Anorexia  Weight loss  Longstanding anorexia. Down 20 lb from Feb. Was previously in the 150 lbs range before this and is now 142 lb. Associated occasional insomnia and low mood with \"a lot of stress.\" Suspect depression may be the cause. Would like to rule out other etiologies in follow up.   - Begin mirtazapine (REMERON) 15 MG tablet; Take 1 tablet (15 mg) by mouth At Bedtime for 30 days  - PHQ-9 in follow up.  - Discussed therapy, she will consider.  - TSH, CMP, CBC in follow-up to begin to work up weight loss further     Depo-Provera contraceptive status  Patient initially requesting Depo for birth control. Discussed that per history she is likely post-menopausal. She then shared that she has been using Depo intermittently to help promote appetite. On chart review, I see that her FSH levels in 2022 indicate that she was in the post-menopausal range.   - Use condoms to prevent STIs.  - Treat low appetite with mirtazapine.   - Stop Depo at this time. Reviewed risks of long-term use on bone health.  - HCG qualitative urine; Future  - HCG qualitative urine    Essential hypertension  BP above goal but did not take morning medication yet. Doesn't like urinary side effects of hydrochlorothiazide and prefers fewer oral meds.   - Start lisinopril-hydrochlorothiazide (ZESTORETIC) 10-12.5 MG tablet; Take 1 tablet by mouth daily for 90 days  - Continue amlodipine at bedtime.   - Follow up for BMP in 2 weeks.      I spent a total of 36 minutes on the day of the visit.   Time spent by me doing chart review, history and exam, documentation and further activities per the note       Follow up in 2 weeks.     Windy Vines MD  [facility] [place] Subjective Noverta is a 53 year old, presenting for the following health issues:  Other (Pt is here for depo renewal. )        8/14/2023    11:02 AM   Additional Questions   Roomed by Moriah   Accompanied by Self       HPI     Measuring " "her blood pressures at home. Her numbers at home have been 130s (rarely above 140) and in the 80s or 90s. Takes her lisinopril and amlodipine at bedtime and her hydrochlorothiazide in the morning. Didn't take her hydrochlorothiazide this morning because it causes increased urination. Doesn't like this side effect and wonders if she needs to keep taking it. Also doesn't like all the pills that she is on - too many.    Had some symptoms of post-menopausal changes with night sweats last year. These have now resolved. Her last menstrual period was last year. She is sexually active and uses condoms. Is primarily using the Depo Provera to help with her appetite. It is chronically low and she has to force herself to eat. She feels like stress is the cause of this. She has been prescribed Lexapro for this in the past but declined to start it when she read on the bottle that it could cause suicidal ideation. She reports intermittent low mood and some issues with insomnia at times. She has been told by friends and family to seek counseling but relies on her rossy and Faith for support. No suicidal ideation.         Objective    BP (!) 154/89   Pulse 86   Temp 99.5  F (37.5  C) (Oral)   Ht 1.676 m (5' 6\")   Wt 64.4 kg (142 lb)   SpO2 98%   BMI 22.92 kg/m    Body mass index is 22.92 kg/m .  Physical Exam   GENERAL: healthy, alert and no distress  RESP: normal rate and effort  PSYCH: mentation appears normal, affect normal/bright with some tears when discussing stressors    Results for orders placed or performed in visit on 08/14/23 (from the past 24 hour(s))   HCG qualitative urine   Result Value Ref Range    hCG Urine Qualitative Negative Negative               "

## 2023-08-28 ENCOUNTER — OFFICE VISIT (OUTPATIENT)
Dept: FAMILY MEDICINE | Facility: CLINIC | Age: 53
End: 2023-08-28
Payer: COMMERCIAL

## 2023-08-28 VITALS
TEMPERATURE: 99.2 F | DIASTOLIC BLOOD PRESSURE: 85 MMHG | OXYGEN SATURATION: 95 % | HEART RATE: 112 BPM | HEIGHT: 66 IN | SYSTOLIC BLOOD PRESSURE: 136 MMHG | BODY MASS INDEX: 22.18 KG/M2 | WEIGHT: 138 LBS

## 2023-08-28 DIAGNOSIS — R63.4 WEIGHT LOSS: ICD-10-CM

## 2023-08-28 DIAGNOSIS — R63.0 ANOREXIA: Primary | ICD-10-CM

## 2023-08-28 DIAGNOSIS — I10 ESSENTIAL HYPERTENSION: ICD-10-CM

## 2023-08-28 LAB
ALBUMIN SERPL BCG-MCNC: 4.3 G/DL (ref 3.5–5.2)
ALBUMIN UR-MCNC: 30 MG/DL
ALP SERPL-CCNC: 106 U/L (ref 35–104)
ALT SERPL W P-5'-P-CCNC: 17 U/L (ref 0–50)
ANION GAP SERPL CALCULATED.3IONS-SCNC: 13 MMOL/L (ref 7–15)
APPEARANCE UR: CLEAR
AST SERPL W P-5'-P-CCNC: 32 U/L (ref 0–45)
BACTERIA #/AREA URNS HPF: ABNORMAL /HPF
BILIRUB SERPL-MCNC: 0.4 MG/DL
BILIRUB UR QL STRIP: ABNORMAL
BUN SERPL-MCNC: 12.4 MG/DL (ref 6–20)
CALCIUM SERPL-MCNC: 9.9 MG/DL (ref 8.6–10)
CHLORIDE SERPL-SCNC: 99 MMOL/L (ref 98–107)
COLOR UR AUTO: YELLOW
CREAT SERPL-MCNC: 0.91 MG/DL (ref 0.51–0.95)
CRP SERPL-MCNC: 7.53 MG/L
DEPRECATED HCO3 PLAS-SCNC: 29 MMOL/L (ref 22–29)
ERYTHROCYTE [DISTWIDTH] IN BLOOD BY AUTOMATED COUNT: 15.6 % (ref 10–15)
ERYTHROCYTE [SEDIMENTATION RATE] IN BLOOD BY WESTERGREN METHOD: 16 MM/HR (ref 0–30)
GFR SERPL CREATININE-BSD FRML MDRD: 75 ML/MIN/1.73M2
GLUCOSE SERPL-MCNC: 92 MG/DL (ref 70–99)
GLUCOSE UR STRIP-MCNC: NEGATIVE MG/DL
HBA1C MFR BLD: 5.8 % (ref 0–5.6)
HCT VFR BLD AUTO: 43.5 % (ref 35–47)
HGB BLD-MCNC: 13.9 G/DL (ref 11.7–15.7)
HGB UR QL STRIP: ABNORMAL
HIV 1+2 AB+HIV1 P24 AG SERPL QL IA: NONREACTIVE
KETONES UR STRIP-MCNC: ABNORMAL MG/DL
LEUKOCYTE ESTERASE UR QL STRIP: ABNORMAL
MCH RBC QN AUTO: 26.6 PG (ref 26.5–33)
MCHC RBC AUTO-ENTMCNC: 32 G/DL (ref 31.5–36.5)
MCV RBC AUTO: 83 FL (ref 78–100)
MUCOUS THREADS #/AREA URNS LPF: PRESENT /LPF
NITRATE UR QL: NEGATIVE
PH UR STRIP: 5.5 [PH] (ref 5–7)
PLATELET # BLD AUTO: 292 10E3/UL (ref 150–450)
POTASSIUM SERPL-SCNC: 3.5 MMOL/L (ref 3.4–5.3)
PROT SERPL-MCNC: 7.8 G/DL (ref 6.4–8.3)
RBC # BLD AUTO: 5.23 10E6/UL (ref 3.8–5.2)
RBC #/AREA URNS AUTO: ABNORMAL /HPF
SODIUM SERPL-SCNC: 141 MMOL/L (ref 136–145)
SP GR UR STRIP: 1.02 (ref 1–1.03)
SQUAMOUS #/AREA URNS AUTO: ABNORMAL /LPF
TSH SERPL DL<=0.005 MIU/L-ACNC: 1.21 UIU/ML (ref 0.3–4.2)
UROBILINOGEN UR STRIP-ACNC: 2 E.U./DL
WBC # BLD AUTO: 5.9 10E3/UL (ref 4–11)
WBC #/AREA URNS AUTO: ABNORMAL /HPF

## 2023-08-28 PROCEDURE — 85652 RBC SED RATE AUTOMATED: CPT | Performed by: FAMILY MEDICINE

## 2023-08-28 PROCEDURE — 87389 HIV-1 AG W/HIV-1&-2 AB AG IA: CPT | Performed by: FAMILY MEDICINE

## 2023-08-28 PROCEDURE — 83036 HEMOGLOBIN GLYCOSYLATED A1C: CPT | Performed by: FAMILY MEDICINE

## 2023-08-28 PROCEDURE — 80053 COMPREHEN METABOLIC PANEL: CPT | Performed by: FAMILY MEDICINE

## 2023-08-28 PROCEDURE — 36415 COLL VENOUS BLD VENIPUNCTURE: CPT | Performed by: FAMILY MEDICINE

## 2023-08-28 PROCEDURE — 85027 COMPLETE CBC AUTOMATED: CPT | Performed by: FAMILY MEDICINE

## 2023-08-28 PROCEDURE — 81001 URINALYSIS AUTO W/SCOPE: CPT | Performed by: FAMILY MEDICINE

## 2023-08-28 PROCEDURE — 99215 OFFICE O/P EST HI 40 MIN: CPT | Performed by: FAMILY MEDICINE

## 2023-08-28 PROCEDURE — 84443 ASSAY THYROID STIM HORMONE: CPT | Performed by: FAMILY MEDICINE

## 2023-08-28 PROCEDURE — 86140 C-REACTIVE PROTEIN: CPT | Performed by: FAMILY MEDICINE

## 2023-08-28 ASSESSMENT — PATIENT HEALTH QUESTIONNAIRE - PHQ9: SUM OF ALL RESPONSES TO PHQ QUESTIONS 1-9: 6

## 2023-08-28 NOTE — PROGRESS NOTES
Assessment & Plan     Anorexia  Weight loss  Chronic with recent acute worsening. Down 20 lb from Feb. 2023 and an additional 4 lb in the past 2 weeks. Considerable potential for mood-related symptom. PHQ-9 of 6, but largely positive for insomnia, low energy, and poor appetite. Did not tolerate mirtazapine. Will trial an alternative agent, sertraline, today while beginning work-up for medical causes of weight loss as below. Normal Hep C serology in fall 2022 and no significant risk factors so not repeating this today.   - TSH with free T4 reflex; Future  - Comprehensive metabolic panel; Future  - CBC with platelets; Future  - TSH with free T4 reflex  - Comprehensive metabolic panel  - CBC with platelets  - Erythrocyte sedimentation rate auto; Future  - CRP inflammation; Future  - UA reflex to Microscopic; Future  - Occult blood stool 1-3 spec; Future  - Hemoglobin A1c; Future  - HIV Antigen Antibody Combo; Future  - Hemoglobin A1c  - Begin sertraline (ZOLOFT) 50 MG tablet; Take 1 tablet (50 mg) by mouth At Bedtime  - Erythrocyte sedimentation rate auto  - CRP inflammation  - UA reflex to Microscopic  - Occult blood stool 1-3 spec  - HIV Antigen Antibody Combo  - Urine Microscopic Exam  - Further work-up pending clinical picture and results of initial blood, urine, and stool testing.     Essential Hypertension  Blood pressure at goal today on current regimen.   - Continue medications as prescribed.    I spent a total of 44 minutes on the day of the visit.   Time spent by me doing chart review, history and exam, documentation and further activities per the note       Nicotine/Tobacco Cessation:  She reports that she has been smoking other. She has never used smokeless tobacco.  Nicotine/Tobacco Cessation Plan:   Information offered: Patient not interested at this time    Follow up in 2 weeks to review results and check back on symptoms.    Windy Vines MD  [facility] [place] Subjective    Bharath is a 53 year old, presenting for the following health issues:  Follow Up (Follow up DM. Pt said she tried the new prescription but not work because it make her feel anxious. /)        8/28/2023    10:42 AM   Additional Questions   Roomed by Moriah   Accompanied by Self       HPI     She tried to take mirtazapine 3 nights in a row but it made her feel jumpy. She couldn't calm down. The next morning she felt quite groggy and drowsy. She stopped this after these 3 attempts.     She continues to have a low appetite. She has to force herself to swallow. She feels hungry but just cannot seem to eat. Her weight is again down 4 more pounds. Some night sweats last night. This is new for her. She denies fever/chills. No change in her bowel habits, stool color, or abdominal pain. No cough or shortness of breath. No dysuria or hematuria. No headaches or changes to her vision. No joint pain or swelling. No cold or heat intolerance. No skin or hair changes. No vaginal bleeding since completing menopause.     Her mood has been down. Sleep is going okay. Concentration is okay. Energy level is very low recently. Not much anxiety.     Cancer screening:  - Colonoscopy done in 2020 and was unremarkable; advised for 10 yr follow up  - Hx of cervical dysplasia; normal colposcopy 3/2023 - recommendation for co-testing in 3/2024  - Mammogram normal in 2/2023  - Lung cancer screening - no prior - history of combustible cigarettes and marijuana     Social History     Tobacco Use    Smoking status: Some Days     Types: Other    Smokeless tobacco: Never   Substance Use Topics    Alcohol use: Yes     Alcohol/week: 0.0 standard drinks of alcohol     Comment: Ocassionally    Drug use: Yes     Types: Marijuana       Family History   Problem Relation Age of Onset    Hypertension Mother     Diabetes No family hx of     Coronary Artery Disease No family hx of     Breast Cancer No family hx of     Cancer - colorectal No family hx of      "Ovarian Cancer No family hx of     Prostate Cancer No family hx of     Other Cancer No family hx of     Cerebrovascular Disease No family hx of     Thyroid Disease No family hx of     Asthma No family hx of            5/17/2017     1:23 PM 10/17/2017     2:57 PM 8/28/2023     1:23 PM   PHQ   PHQ-9 Total Score 2 10 6   Q9: Thoughts of better off dead/self-harm past 2 weeks Not at all Not at all Not at all           Objective    /85   Pulse 112   Temp 99.2  F (37.3  C) (Oral)   Ht 1.676 m (5' 6\")   Wt 62.6 kg (138 lb)   SpO2 95%   BMI 22.27 kg/m    Body mass index is 22.27 kg/m .  Physical Exam   GENERAL: thin, in no acute distress  NECK: no adenopathy, no asymmetry, masses, or scars and thyroid normal to palpation  RESP: lungs clear to auscultation - no rales, rhonchi or wheezes  CV: regular rate and rhythm, normal S1 S2, no S3 or S4, no murmur, click or rub, no peripheral edema and peripheral pulses strong  ABDOMEN: soft, nontender, no hepatosplenomegaly, no masses and bowel sounds normal  MS: no gross musculoskeletal defects noted, no edema  PSYCH: mentation appears normal, affect normal/bright  LYMPH: no cervical or supraclavicular adenopathy    "

## 2023-08-29 ENCOUNTER — TELEPHONE (OUTPATIENT)
Dept: FAMILY MEDICINE | Facility: CLINIC | Age: 53
End: 2023-08-29
Payer: COMMERCIAL

## 2023-08-29 DIAGNOSIS — R63.4 WEIGHT LOSS: Primary | ICD-10-CM

## 2023-08-29 DIAGNOSIS — R63.0 ANOREXIA: ICD-10-CM

## 2023-08-29 NOTE — TELEPHONE ENCOUNTER
I called Bharath to share the following results from her tests yesterday:    Most of the tests were normal:  - Normal thyroid function  - Normal liver and kidney function  - Normal blood cell counts - no signs of anemia or blood cell cancer  - Normal/negative HIV test    - She does have prediabetes range blood sugars over the past 3 months  - Her urine showed potential signs of infection but she has no symptoms including no dysuria, urgency, frequency, or abdominal pain  - One of the inflammatory tests was slightly elevated  (CRP) but the other was normal (ESR). This could be signs of an acute inflammatory response from an infection,though she has no symptoms of infection.     We discussed repeating the urine at her next visit, dropping off the stool test for blood, and planning for a chest xray in 2 weeks on 9/18.     She will  the sertraline and start this tonight.     Windy Vines MD

## 2023-09-18 ENCOUNTER — ANCILLARY PROCEDURE (OUTPATIENT)
Dept: GENERAL RADIOLOGY | Facility: CLINIC | Age: 53
End: 2023-09-18
Attending: FAMILY MEDICINE
Payer: COMMERCIAL

## 2023-09-18 ENCOUNTER — OFFICE VISIT (OUTPATIENT)
Dept: FAMILY MEDICINE | Facility: CLINIC | Age: 53
End: 2023-09-18
Payer: COMMERCIAL

## 2023-09-18 VITALS — SYSTOLIC BLOOD PRESSURE: 120 MMHG | DIASTOLIC BLOOD PRESSURE: 80 MMHG | HEART RATE: 77 BPM | TEMPERATURE: 98.1 F

## 2023-09-18 DIAGNOSIS — R63.0 ANOREXIA: ICD-10-CM

## 2023-09-18 DIAGNOSIS — N89.8 VAGINAL DISCHARGE: ICD-10-CM

## 2023-09-18 DIAGNOSIS — R63.4 WEIGHT LOSS: Primary | ICD-10-CM

## 2023-09-18 DIAGNOSIS — R10.13 EPIGASTRIC PAIN: ICD-10-CM

## 2023-09-18 DIAGNOSIS — R82.81 PYURIA: ICD-10-CM

## 2023-09-18 DIAGNOSIS — R31.9 HEMATURIA, UNSPECIFIED TYPE: ICD-10-CM

## 2023-09-18 DIAGNOSIS — I10 ESSENTIAL HYPERTENSION: ICD-10-CM

## 2023-09-18 DIAGNOSIS — R73.03 PREDIABETES: ICD-10-CM

## 2023-09-18 DIAGNOSIS — R63.4 WEIGHT LOSS: ICD-10-CM

## 2023-09-18 LAB
ALBUMIN UR-MCNC: ABNORMAL MG/DL
APPEARANCE UR: CLEAR
BACTERIA #/AREA URNS HPF: ABNORMAL /HPF
BILIRUB UR QL STRIP: NEGATIVE
CLUE CELLS: ABNORMAL
COLOR UR AUTO: YELLOW
GLUCOSE UR STRIP-MCNC: NEGATIVE MG/DL
HEMOCCULT STL QL: NEGATIVE
HGB UR QL STRIP: ABNORMAL
KETONES UR STRIP-MCNC: 15 MG/DL
LEUKOCYTE ESTERASE UR QL STRIP: ABNORMAL
NITRATE UR QL: NEGATIVE
PH UR STRIP: 5.5 [PH] (ref 5–7)
RBC #/AREA URNS AUTO: ABNORMAL /HPF
SP GR UR STRIP: 1.02 (ref 1–1.03)
SQUAMOUS #/AREA URNS AUTO: ABNORMAL /LPF
TRICHOMONAS, WET PREP: ABNORMAL
UROBILINOGEN UR STRIP-ACNC: 0.2 E.U./DL
WBC #/AREA URNS AUTO: ABNORMAL /HPF
WBC'S/HIGH POWER FIELD, WET PREP: ABNORMAL
YEAST, WET PREP: ABNORMAL

## 2023-09-18 PROCEDURE — 99215 OFFICE O/P EST HI 40 MIN: CPT | Performed by: FAMILY MEDICINE

## 2023-09-18 PROCEDURE — 71046 X-RAY EXAM CHEST 2 VIEWS: CPT | Mod: TC | Performed by: RADIOLOGY

## 2023-09-18 PROCEDURE — 87210 SMEAR WET MOUNT SALINE/INK: CPT | Performed by: FAMILY MEDICINE

## 2023-09-18 PROCEDURE — 99417 PROLNG OP E/M EACH 15 MIN: CPT | Performed by: FAMILY MEDICINE

## 2023-09-18 PROCEDURE — 87491 CHLMYD TRACH DNA AMP PROBE: CPT | Performed by: FAMILY MEDICINE

## 2023-09-18 PROCEDURE — 87591 N.GONORRHOEAE DNA AMP PROB: CPT | Performed by: FAMILY MEDICINE

## 2023-09-18 PROCEDURE — 87086 URINE CULTURE/COLONY COUNT: CPT | Performed by: FAMILY MEDICINE

## 2023-09-18 PROCEDURE — 82272 OCCULT BLD FECES 1-3 TESTS: CPT | Performed by: FAMILY MEDICINE

## 2023-09-18 PROCEDURE — 81001 URINALYSIS AUTO W/SCOPE: CPT | Performed by: FAMILY MEDICINE

## 2023-09-18 RX ORDER — LISINOPRIL 10 MG/1
10 TABLET ORAL DAILY
Qty: 90 TABLET | Refills: 1 | Status: SHIPPED | OUTPATIENT
Start: 2023-09-18 | End: 2023-10-30

## 2023-09-18 RX ORDER — ESCITALOPRAM OXALATE 10 MG/1
10 TABLET ORAL DAILY
Qty: 30 TABLET | Refills: 1 | Status: SHIPPED | OUTPATIENT
Start: 2023-09-18 | End: 2023-10-30

## 2023-09-18 NOTE — PROGRESS NOTES
Assessment & Plan     Weight loss  Anorexia  Acute exacerbation of chronic issue. Down 24 lb from Feb. 2023, no further weight loss over the past 2 weeks . Considerable potential for mood-related symptoms per conversation with patient though PHQ-9 was only 6 in Aug. . Did not tolerate mirtazapine or sertraline. Will trial an alternative agent, escitalopram, today. Also starting a PPI given epigastric pain & discussed the importance of obtaining an upper endoscopy. Cancer screening otherwise up-to-date. Continuing work-up for medical causes of weight loss with repeat urinalysis and CXR.  - XR CHEST 2 VW; Future  - escitalopram (LEXAPRO) 10 MG tablet; Take 1 tablet (10 mg) by mouth daily  - UA reflex to Microscopic; Future  - UA reflex to Microscopic  - Begin PPI as below.  - Again discussed that continuing Depo indefinitely is inappropriate given the potential long-term side effects of this medication and masking an underlying serious cause of weight loss.    Epigastric pain  Newly disclosed today. May represent gastritis vs. Ulcer though not affected by eating significantly. Given weight loss, EGD is important. Will begin a trial of PPI and discuss endoscopy again in follow up. FOBT also pending.  - omeprazole (PRILOSEC) 20 MG DR capsule; Take 1 capsule (20 mg) by mouth daily    Pyuria  Hematuria, unspecified type  No other urinary symptoms but evidence of pyuria and hematuria on previous sample obtained to assess causes of weight loss. Repeating today.   - UA reflex to Microscopic; Future  - UA reflex to Microscopic    Vaginal discharge  Thin discharge. Concern for STI low but obtaining work-up as below and will treat accordingly.   - Chlamydia trachomatis/Neisseria gonorrhoeae by PCR  - Wet preparation    Essential hypertension  BP well-controlled on current regimen. Patient is concerned about potential side effects of hydrochlorothiazide. Given weight loss, she may no longer require multiple agents. Will  discontinue hydrochlorothiazide. Keep taking lisinopril and amlodipine. Recheck BP in follow-up.  - lisinopril (ZESTRIL) 10 MG tablet; Take 1 tablet (10 mg) by mouth daily    Prediabetes  Newly diagnosed in work-up of weight loss. Results disclosed but not discussed in detail today. Unlikely to be the cause of her weight loss.   - Will review in more detail at next visit.     I spent a total of 62 minutes on the day of the visit.   Time spent by me doing chart review, history and exam, documentation and further activities per the note       Follow up in 2 weeks.     Windy Vines MD  [facility] [place] Subjective Noverta is a 53 year old, presenting for the following health issues:  Recheck Medication (hctz), Medication Problem, Abdominal Pain, Contractions (DEPO), and Anorexia      HPI     She took the sertraline for one dose and didn't tolerate it. It kept her up all night and she had some stomach issues.  She'd really like to take Depo again because she felt great on this. She reports that her mood is okay. She's sleeping better than before.     She is worried that her hydrochlorothiazide is causing some of the symptoms that she is experiencing. She's had increased bleeding and swelling of the gums in her mouth and some intermittent upper abdominal cramping and aching. Intermittent symptoms for years. She's been on the hydrochlorothiazide since at least 2015. She reports some epigastric tenderness. No GERD. No increased abdominal pain when she eats - it's mostly when she is not eating and feels better when she eats. No swallowing difficulties. No black stools.     History of smoking cannabis regularly but no tobacco use. No coughing or shortness of breath.     No pelvic pain. No urinary frequency, urgency, or dysuria.   Increased vaginal discharge that is watery but doesn't have a strong odor. No new sexual partners. No sexual activity since March and it was not painful. No concerns  for STI.     Cancer screening:   - Pap smear up to date through 3/2024 and no symptoms of vaginal bleeding  - Mammogram normal in 2023 and no new lumps  - Colonoscopy normal in  and no change in stooling pattern   - Lung cancer screening flagged today - patient reports no history of combustible tobacco use, only cannabis    FH: Her mother had to have an upper endoscopy after not being able to eat for almost a year. She doesn't recall that this was related to cancer but isn't sure of the cause. Her mother  from complications of ESRD.        Objective    /80   Pulse 77   Temp 98.1  F (36.7  C) (Oral)   There is no height or weight on file to calculate BMI.  Physical Exam   GENERAL: healthy, alert and no distress  NECK: no adenopathy, no asymmetry, masses, or scars and thyroid normal to palpation  RESP: lungs clear to auscultation - no rales, rhonchi or wheezes  CV: regular rate and rhythm, normal S1 S2, no S3 or S4, no murmur, click or rub, no peripheral edema and peripheral pulses strong  ABDOMEN: soft, mild epigastric tenderness, no hepatosplenomegaly, no masses and bowel sounds normal  PSYCH: mentation appears normal, affect normal/bright    Labs: pending    CXR, 2023: no acute infiltrates or lesions apparent on my read.

## 2023-09-18 NOTE — PATIENT INSTRUCTIONS
- I'll call with your results by tomorrow    - Stop the combination pill with hydrochlorothiazide.    - Start the lisinopril 10 mg pill by itself and keep taking the amlodipine.     - Start the escitalopram once daily - either morning or night depending on how you feel    - Start the omeprazole (antacid for your stomach) and take this once daily before your first meal    - Let's follow up in 2 weeks

## 2023-09-18 NOTE — LETTER
Patient:  Bharath Forbes  :   1970  MRN:     [id number]      2023    Patient Name:  Bharath Forbes    Physician: Windy Vines MD    Bharath was seen in the clinic today. She can return to work on 23 without restrictions.       Sincerely,       Windy Vines MD

## 2023-09-19 LAB
C TRACH DNA SPEC QL PROBE+SIG AMP: NEGATIVE
N GONORRHOEA DNA SPEC QL NAA+PROBE: NEGATIVE

## 2023-09-20 LAB — BACTERIA UR CULT: NO GROWTH

## 2023-10-30 ENCOUNTER — OFFICE VISIT (OUTPATIENT)
Dept: FAMILY MEDICINE | Facility: CLINIC | Age: 53
End: 2023-10-30
Payer: COMMERCIAL

## 2023-10-30 VITALS
OXYGEN SATURATION: 97 % | TEMPERATURE: 97 F | SYSTOLIC BLOOD PRESSURE: 157 MMHG | BODY MASS INDEX: 22.02 KG/M2 | HEIGHT: 66 IN | WEIGHT: 137 LBS | DIASTOLIC BLOOD PRESSURE: 94 MMHG

## 2023-10-30 DIAGNOSIS — R63.4 WEIGHT LOSS: Primary | ICD-10-CM

## 2023-10-30 DIAGNOSIS — I10 ESSENTIAL HYPERTENSION: ICD-10-CM

## 2023-10-30 DIAGNOSIS — R31.21 ASYMPTOMATIC MICROSCOPIC HEMATURIA: ICD-10-CM

## 2023-10-30 DIAGNOSIS — R73.03 PREDIABETES: ICD-10-CM

## 2023-10-30 DIAGNOSIS — R10.13 EPIGASTRIC PAIN: ICD-10-CM

## 2023-10-30 DIAGNOSIS — Z13.220 LIPID SCREENING: ICD-10-CM

## 2023-10-30 DIAGNOSIS — N93.9 VAGINAL SPOTTING: ICD-10-CM

## 2023-10-30 DIAGNOSIS — I10 WHITE COAT SYNDROME WITH DIAGNOSIS OF HYPERTENSION: ICD-10-CM

## 2023-10-30 DIAGNOSIS — R63.0 ANOREXIA: ICD-10-CM

## 2023-10-30 LAB
BACTERIA #/AREA URNS HPF: ABNORMAL /HPF
CHOLEST SERPL-MCNC: 156 MG/DL
CREAT UR-MCNC: 133 MG/DL
HDLC SERPL-MCNC: 56 MG/DL
LDLC SERPL CALC-MCNC: 87 MG/DL
MICROALBUMIN UR-MCNC: 32.2 MG/L
MICROALBUMIN/CREAT UR: 24.21 MG/G CR (ref 0–25)
NONHDLC SERPL-MCNC: 100 MG/DL
RBC #/AREA URNS AUTO: ABNORMAL /HPF
SQUAMOUS #/AREA URNS AUTO: ABNORMAL /LPF
TRIGL SERPL-MCNC: 64 MG/DL
WBC #/AREA URNS AUTO: ABNORMAL /HPF

## 2023-10-30 PROCEDURE — 81015 MICROSCOPIC EXAM OF URINE: CPT | Performed by: FAMILY MEDICINE

## 2023-10-30 PROCEDURE — 80061 LIPID PANEL: CPT | Performed by: FAMILY MEDICINE

## 2023-10-30 PROCEDURE — 82570 ASSAY OF URINE CREATININE: CPT | Performed by: FAMILY MEDICINE

## 2023-10-30 PROCEDURE — 82043 UR ALBUMIN QUANTITATIVE: CPT | Performed by: FAMILY MEDICINE

## 2023-10-30 PROCEDURE — 87086 URINE CULTURE/COLONY COUNT: CPT | Performed by: FAMILY MEDICINE

## 2023-10-30 PROCEDURE — 36415 COLL VENOUS BLD VENIPUNCTURE: CPT | Performed by: FAMILY MEDICINE

## 2023-10-30 PROCEDURE — 99215 OFFICE O/P EST HI 40 MIN: CPT | Performed by: FAMILY MEDICINE

## 2023-10-30 ASSESSMENT — PATIENT HEALTH QUESTIONNAIRE - PHQ9: SUM OF ALL RESPONSES TO PHQ QUESTIONS 1-9: 1

## 2023-10-30 NOTE — COMMUNITY RESOURCES LIST (ENGLISH)
10/30/2023   [facility] - Outpatient Clinics  N/A  For additional resource needs, please contact your health insurance member services or your primary care team.  Phone: [phone]   Email: N/A   Address: [address] [place] [address]   Hours: N/A        Transportation       Free or low-cost transportation  1  [facility]  Office - [facility] - Gas vouchers and transportation assistance - Free or low-cost transportation Distance: 0.77 miles      In-Person, Phone/Virtual   [address] [place] [address]  Language: English  Hours: Mon - Fri 8:00 AM - 12:00 PM , Mon - Fri 1:00 PM - 4:00 PM  Fees: Free   Phone: (519) 631-3256 Email: [email or web address] Website: [email or web address]/social-services-office-[email or web address]/     2  [employer] - [facility] Circulator Bus Distance: 5.56 miles      In-Person   [address] [place] [address]  Language: English  Hours: Tue 9:00 AM - 2:00 PM  Fees: Self Pay   Phone: (433) 926-8639 Email: info@[employer] Website: http://www.[employer].org/     Transportation to medical appointments  3  [employer] Distance: 6.53 miles      [address] [place] [address]  Language: English, Chinese, Hmong, Finnish, Congolese  Hours: Mon - Sun 5:00 AM - 10:00 PM  Fees: Insurance, Self Pay   Phone: (434) 382-4001 Email: [employer]_mob[employer] Website: https://[email or web address].info/Providers/[employer]/Transportation/1?pos=9     4  [facility] - Non-Emergency Medical Transportation Distance: 7.19 miles      In-Person   [address] [place] [address]  Language: English  Hours: Mon - Fri 8:00 AM - 4:00 PM Appt. Only  Fees: Self Pay   Phone: (567) 987-1752 Website: http://www.[email or web address]/Medical-Services/Emergency-medical-services/Non-emergency-transportation/          Important Numbers & Websites        [facility]   [address].org  Poison Control   (599) 689-2159 [place]  Suicide and Crisis Lifeline   988 988[facility].org  [facility]help [place] Child Abuse Hotline   468.393.9855 Childhelphotline.org  [place] Sexual Assault Hotline   (130) 935-6776 (HOPE) [facility] Runaway Safeline   (571) 831-4388 (RUNAWAY) [facility].org  Pregnancy & Postpartum Support [place]   Call/text 130-844-7846 Ppsupport[place].org  Substance Abuse National Helpline ([facility]   697-949-HELP (4410) Findtreatment.gov  Emergency Services   914

## 2023-10-30 NOTE — PATIENT INSTRUCTIONS
- If you continue to have abnormal vaginal discharge with possible spotting, please come back in the next 2-4 weeks.     - You have prediabetes. We should recheck this in one year. Work on cutting back on red meat, eating more fruits and vegetables, and incorporating more whole grains.     - I will call with the results of your urine and blood tests. If they are off, we will need to do more tests like a CT scan.    - Continue to take amlodipine only for now. If you start to see your blood pressures increasing above 140/90 consistently, we need to adjust your medications.     - Follow up in 4 weeks

## 2023-10-30 NOTE — COMMUNITY RESOURCES LIST (ENGLISH)
10/30/2023   [facility]  N/A  For additional resource needs, please contact your health insurance member services or your primary care team.  Phone: [phone]   Email: N/A   Address: [address] [place] [address]   Hours: N/A        Transportation       Free or low-cost transportation  1  [facility]  Office - [facility] - Gas vouchers and transportation assistance - Free or low-cost transportation Distance: 0.77 miles      In-Person, Phone/Virtual   [address] [place] [address]  Language: English  Hours: Mon - Fri 8:00 AM - 12:00 PM , Mon - Fri 1:00 PM - 4:00 PM  Fees: Free   Phone: (883) 538-4180 Email: [email or web address] Website: https://[facility]/social-services-office-[place]/     2  [employer] - [facility] Circulator Bus Distance: 5.56 miles      In-Person   [address] [place] [address]  Language: English  Hours: Tue 9:00 AM - 2:00 PM  Fees: Self Pay   Phone: (329) 942-8978 Email: info@[facility] Website: http://www.[employer].org/     Transportation to medical appointments  3  [employer] Distance: 6.53 miles      [address] [place] [address]  Language: English, Bahraini, Hmong, Fijian, Comoran  Hours: Mon - Sun 5:00 AM - 10:00 PM  Fees: Insurance, Self Pay   Phone: (944) 496-1163 Email: [email or web address]_mobility@[employer] Website: https://www.[facility].info/Providers/[employer]_Mob[employer]C/Transportation/1?pos=9     4  [facility] - Non-Emergency Medical Transportation Distance: 7.19 miles      In-Person   [address] [place] [address]  Language: English  Hours: Mon - Fri 8:00 AM - 4:00 PM Appt. Only  Fees: Self Pay   Phone: (495) 725-8204 Website: http://www.[facility].org/Medical-Services/Emergency-medical-services/Non-emergency-transportation/          Important Numbers & Websites        [facility]   211 211unitedway.org  Poison Control   (331) 792-4801 [place]  Suicide and Crisis Lifeline   988 988[facility] Hospit[facility].org  Childhelp [place] Child Abuse Hotline   379.860.4050 Childhelphotline.org  [place] Sexual Assault Hotline   (548) 126-8050 (HOPE) [facility] Runaway Safeline   (472) 626-8041 (RUNAWAY) [facility].org  Pregnancy & Postpartum Support [place]   Call/text 861-818-5465 Ppsupportmn.org  Substance Abuse National Helpline ([facility]   740-106-HELP (7351) Findtreatment.gov  Emergency Services   919

## 2023-10-30 NOTE — PROGRESS NOTES
"  {PROVIDER CHARTING PREFERENCE:033536}    Mike Sinha is a 53 year old, presenting for the following health issues:  RECHECK (Bp check ) and depo shot       10/30/2023     8:56 AM   Additional Questions   Roomed by leonarda PALACIOS     {MA/LPN/RN Pre-Provider Visit Orders- hCG/UA/Strep (Optional):456176}  {SUPERLIST (Optional):139735}  {additonal problems for provider to add (Optional):999862}      Review of Systems   {ROS COMP (Optional):475343}      Objective    BP (!) 175/82   Temp 97  F (36.1  C) (Oral)   Ht 1.676 m (5' 6\")   Wt 62.1 kg (137 lb)   SpO2 97%   BMI 22.11 kg/m    Body mass index is 22.11 kg/m .  Physical Exam   {Exam List (Optional):881098}    {Diagnostic Test Results (Optional):227652}    {AMBULATORY ATTESTATION (Optional):256785}              "

## 2023-10-30 NOTE — COMMUNITY RESOURCES LIST (ENGLISH)
10/30/2023   [facility]  N/A  For questions about this resource list or additional care needs, please contact your primary care clinic or care manager.  Phone: [phone]   Email: N/A   Address: [address] [place] [address]   Hours: N/A        Transportation       Free or low-cost transportation  1  [facility]  Office - [facility] - Gas vouchers and transportation assistance - Free or low-cost transportation Distance: 0.77 miles      In-Person, Phone/Virtual   [address] [place] [address]  Language: English  Hours: Mon - Fri 8:00 AM - 12:00 PM , Mon - Fri 1:00 PM - 4:00 PM  Fees: Free   Phone: (413) 187-4386 Email: elias@[facility] Website: https://[facility]/social-services-office-[place]/     2  [employer] - [facility] Circulator Bus Distance: 5.56 miles      In-Person   [address] [place] [address]  Language: English  Hours: Tue 9:00 AM - 2:00 PM  Fees: Self Pay   Phone: (377) 569-3967 Email: info@[employer] Website: http://www.[employer].org/     Transportation to medical appointments  3  [employer] Distance: 6.53 miles      [address] [place] [address]  Language: English, New Zealander, Hmong, Micronesian, Lao  Hours: Mon - Sun 5:00 AM - 10:00 PM  Fees: Insurance, Self Pay   Phone: (972) 639-8989 Email: maximalcare_mobility@[employer] Website: https://www.[facility].info/Providers/Max[employer]_Mobility_LLC/Transportation/1?pos=9     4  [facility] - Non-Emergency Medical Transportation Distance: 7.19 miles      In-Person   [address] [place] [address]  Language: English  Hours: Mon - Fri 8:00 AM - 4:00 PM Appt. Only  Fees: Self Pay   Phone: (765) 797-5905 Website: http://www.[facility].org/Medical-Services/Emergency-medical-services/Non-emergency-transportation/          Important Numbers &  Websites       Emergency Services   [address]  [place] [address]  Poison Control   (552) 282-2945  Suicide Prevention Lifeline   (769) 773-8357 (TALK)  Child Abuse Hotline   (175) 461-2328 (4-A-Child)  Sexual Assault Hotline   (975) 866-6077 (HOPE)  National Runaway Safeline   (264) 174-4456 (RUNAWAY)  All-[employer] Talkline   (900) 682-2291  Substance Abuse Referral   (823) 228-8822 (HELP)

## 2023-10-30 NOTE — PROGRESS NOTES
Assessment & Plan     Weight loss  Anorexia  Acute exacerbation of chronic issue. Down 25 lb from Feb. 2023, though only 1 lb decreased from September so is stabilizing. Appetite/food intake improving in the past month. Perhaps this is due to cannabis use? Initial work-up has been unrevealing for a clear cause aside from asymptomatic microscopic hematuria and prediabetes. Cancer screening up to date. Was experiencing transient epigastric pain that improved spontaneously without PPI. Will begin further evaluation of hematuria today.   - Urine Microscopic Exam; Future  - Albumin Random Urine Quantitative with Creat Ratio; Future  - Urine Culture  - Urine Microscopic Exam  - Albumin Random Urine Quantitative with Creat Ratio  - Continue to closely monitor appetite and weight gain.  - Counseled on risks of combustible cannabis especially and how to mitigate them    Essential hypertension  White coat syndrome with diagnosis of hypertension  BP well controlled at home on amlodipine only. Range is primarily 130s/80s. Medication requirements may be lower due to recent weight loss. She prefers to avoid additional meds.  - Continue on amlodipine.   - Continue to monitor home BP. Close follow up.    Lipid screening  Due for routine screening.   - Lipid panel reflex to direct LDL Non-fasting; Future  - Lipid panel reflex to direct LDL Non-fasting    Asymptomatic microscopic hematuria  Noted on labs obtained for unexplained weight loss. Persistent since August 2023 and prior urine cultures did not show any signs of infection. May be from a contaminant with vaginal discharge? Concerning in the setting of unexplained weight loss for malignancy.  - Urine Microscopic Exam; Future  - Albumin Random Urine Quantitative with Creat Ratio; Future  - Urine Microscopic Exam  - Albumin Random Urine Quantitative with Creat Ratio  - Anticipate CT and urology referral if urine albumin is not abnormal.     Vaginal spotting  Several month  history of thin vaginal discharge that is often a large volume. Previous work-up with wet prep and GC/CT testing in Sept was unremarkable. Today is reporting some blood-tinged spotting for the past week. 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   - Discussed that vaginal bleeding after menopause is abnormal and should be evaluated, especially since our infection work-up has been negative.   - Pelvic exam and low threshold for pelvic US in follow up if persisting.    Epigastric pain  Spontaneously resolved off PPI. Unclear what the cause might have been. The only other changes were discontinuing lisinopril and hydrochlorothiazide. Weight loss but no other red flags with stooling changes. Neg FOBT in Sept. 2023 is reassuring.  - Will continue to follow. Low threshold for EGD if persistent weight loss.     Prediabetes  Reviewed A1c of 5.7 again from August 2023 and what this means. She has a strong FH of DM II.  - Discussed ways to improve glycemic control through diet and exercise today.   - Repeat A1c in one year.    I spent a total of 48 minutes on the day of the visit.   Time spent by me doing chart review, history and exam, documentation and further activities per the note      Follow up in 4 weeks or sooner if any worsening.    Windy Vines MD  [facility]    Mike Sinha is a 53 year old, presenting for the following health issues:  RECHECK (Bp check ) and depo shot         10/30/2023     8:56 AM   Additional Questions   Roomed by leonarda PALACIOS     - Blood pressures: Have been in the 130s systolic and 80-90s at home. Has only been taking amlodipine. She stopped the lisinopril medication along with the hydrochlorothiazide after our last visit. She doesn't feel that she needs it and wonders if this is what was making her feel off/not able to eat. No shortness of breath, chest pain, headaches, lightheadedness, or visual changes.     - She  "has been eating more closely at her baseline. Smoking cannabis more regularly - 2 joints per day - which she feels helps. Feels like her mood and sleep are also much better. Weight is relatively steady since our last visit in September. She would like to weigh more but is happy that she's able to eat more again and that her weight isn't continuing to drop.    - Has been having some vaginal discharge that seems tinged with blood. This started last week. Light brown in color. Some fluctuations in the amount and nature of her discharge. Has to wear a liner every day. Brownish color comes and goes. No pelvic pain or cramping. Feels like she would be due for her Depo.     - Microscopic hematuria: We reviewed her recent urine testing. She again denies any urinary symptoms. She does not feel like she was having spotting at the time of her last visit with the urine specimen.     - Stomach upset. She never started the medication. No more pain. It improved quite quickly after her last visit. She thinks maybe this is because one of the bp meds was causing it. No changes in her stools. No blood or melena.     Preventive health:  - Pap smear due in March 2023  - Vaccines: declines influenza and COVID today        Objective    BP (!) 157/94   Temp 97  F (36.1  C) (Oral)   Ht 1.676 m (5' 6\")   Wt 62.1 kg (137 lb)   SpO2 97%   BMI 22.11 kg/m    Body mass index is 22.11 kg/m .  Physical Exam   GENERAL: healthy, alert and no distress  RESP: Normal rate and effort  PSYCH: mentation normal, mood upbeat          10/17/2017     2:57 PM 8/28/2023     1:23 PM 10/30/2023    10:04 AM   PHQ   PHQ-9 Total Score 10 6 1   Q9: Thoughts of better off dead/self-harm past 2 weeks Not at all Not at all Not at all           "

## 2023-10-31 LAB — BACTERIA UR CULT: NORMAL

## 2023-11-02 ENCOUNTER — TELEPHONE (OUTPATIENT)
Dept: FAMILY MEDICINE | Facility: CLINIC | Age: 53
End: 2023-11-02
Payer: COMMERCIAL

## 2023-11-02 DIAGNOSIS — R31.21 ASYMPTOMATIC MICROSCOPIC HEMATURIA: Primary | ICD-10-CM

## 2023-11-02 DIAGNOSIS — R63.4 WEIGHT LOSS: ICD-10-CM

## 2023-11-02 NOTE — TELEPHONE ENCOUNTER
Can we please call Sudhakardottie to share the following results:    Her cholesterol looks great!    She continues to have trace amounts of blood in her urine and her urine culture again confirms that this is not from an infection. Her kidneys appear to be functioning in the normal range also. As we discussed at her visit this week, the next step in determining where this blood is coming from is to a) do a CT scan of her abdomen and pelvis and b) set up an appointment with a urologist. I placed the orders for both of these today.     If she has questions about this plan, I'll be back in the clinic on Monday and can call her then.     Thanks!    Windy Vines MD

## 2023-11-03 NOTE — TELEPHONE ENCOUNTER
Called and relay message to pt. Pt is requesting for a call back form Dr. Vines on Monday. Please call pt back on Monday.

## 2023-11-07 NOTE — TELEPHONE ENCOUNTER
I again tried to call Bharath at her work number and did not reach her. Will try again later this week. If she calls back, please tell her that I am back in the office on Friday and could talk then.     Thanks!    Windy Vines MD

## 2023-11-10 NOTE — TELEPHONE ENCOUNTER
I again attempted to call Bharath to discuss her lab results and next steps in the work-up. She did not answer. I left a message for her to call back.     Windy Vines MD

## 2023-11-13 ENCOUNTER — TELEPHONE (OUTPATIENT)
Dept: FAMILY MEDICINE | Facility: CLINIC | Age: 53
End: 2023-11-13
Payer: COMMERCIAL

## 2023-11-13 NOTE — TELEPHONE ENCOUNTER
I again called Bharath to address her questions. She doesn't have any specific questions and wants to move forward with scheduling the CT scan that I ordered. Can we please reach out to her at home/cell phone number ending in [phone] to schedule the CT scan.     Thanks!    Windy Vines MD

## 2023-11-13 NOTE — TELEPHONE ENCOUNTER
Patient called back,Dr lott is running abit behind and will call her back at lunch hour. Patient verbalize ok. Will wait for call. Please call and advise, thank you.

## 2023-11-19 ENCOUNTER — HOSPITAL ENCOUNTER (OUTPATIENT)
Dept: CT IMAGING | Facility: HOSPITAL | Age: 53
Discharge: HOME | End: 2023-11-19
Attending: FAMILY MEDICINE | Admitting: FAMILY MEDICINE
Payer: COMMERCIAL

## 2023-11-19 DIAGNOSIS — R63.4 WEIGHT LOSS: ICD-10-CM

## 2023-11-19 DIAGNOSIS — R31.21 ASYMPTOMATIC MICROSCOPIC HEMATURIA: ICD-10-CM

## 2023-11-19 PROCEDURE — 74178 CT ABD&PLV WO CNTR FLWD CNTR: CPT

## 2023-11-19 PROCEDURE — 250N000011 HC RX IP 250 OP 636: Performed by: FAMILY MEDICINE

## 2023-11-19 RX ORDER — IOPAMIDOL 755 MG/ML
90 INJECTION, SOLUTION INTRAVASCULAR ONCE
Status: COMPLETED | OUTPATIENT
Start: 2023-11-19 | End: 2023-11-19

## 2023-11-19 RX ADMIN — IOPAMIDOL 90 ML: 755 INJECTION, SOLUTION INTRAVENOUS at 10:34

## 2023-11-22 ENCOUNTER — TELEPHONE (OUTPATIENT)
Dept: FAMILY MEDICINE | Facility: CLINIC | Age: 53
End: 2023-11-22
Payer: COMMERCIAL

## 2023-11-22 DIAGNOSIS — N30.01 ACUTE CYSTITIS WITH HEMATURIA: Primary | ICD-10-CM

## 2023-11-22 RX ORDER — NITROFURANTOIN 25; 75 MG/1; MG/1
100 CAPSULE ORAL 2 TIMES DAILY
Qty: 10 CAPSULE | Refills: 0 | Status: SHIPPED | OUTPATIENT
Start: 2023-11-22 | End: 2023-11-27

## 2023-11-22 NOTE — TELEPHONE ENCOUNTER
I called Bharath to review the results of her recent CT pelvis. The imaging was significant for diffuse thickening of the bladder wall that was suggestive of cystitis. She continues to not have any significant symptoms aside from intermittent frequency when drinking different beverages. No dysuria. Previous urine cultures have not been consistent with infection. However, given the bladder abnormalities, will proceed with empiric treatment for acute cystitis with a 5 day course of nitrofurantoin. She will also call to schedule the urology appointment for further assessment of her microscopic hematuria.     Windy Vines MD

## 2023-12-01 ENCOUNTER — OFFICE VISIT (OUTPATIENT)
Dept: FAMILY MEDICINE | Facility: CLINIC | Age: 53
End: 2023-12-01
Payer: COMMERCIAL

## 2023-12-01 VITALS
WEIGHT: 136 LBS | BODY MASS INDEX: 21.86 KG/M2 | HEIGHT: 66 IN | DIASTOLIC BLOOD PRESSURE: 83 MMHG | HEART RATE: 90 BPM | RESPIRATION RATE: 20 BRPM | SYSTOLIC BLOOD PRESSURE: 178 MMHG | OXYGEN SATURATION: 98 %

## 2023-12-01 DIAGNOSIS — F41.1 GAD (GENERALIZED ANXIETY DISORDER): ICD-10-CM

## 2023-12-01 DIAGNOSIS — N93.9 VAGINAL SPOTTING: ICD-10-CM

## 2023-12-01 DIAGNOSIS — R63.4 WEIGHT LOSS: Primary | ICD-10-CM

## 2023-12-01 DIAGNOSIS — R31.21 ASYMPTOMATIC MICROSCOPIC HEMATURIA: ICD-10-CM

## 2023-12-01 DIAGNOSIS — I10 ESSENTIAL HYPERTENSION: ICD-10-CM

## 2023-12-01 DIAGNOSIS — R63.0 ANOREXIA: ICD-10-CM

## 2023-12-01 LAB
VIT B12 SERPL-MCNC: 1146 PG/ML (ref 232–1245)
VIT D+METAB SERPL-MCNC: 30 NG/ML (ref 20–50)

## 2023-12-01 PROCEDURE — 82306 VITAMIN D 25 HYDROXY: CPT | Performed by: FAMILY MEDICINE

## 2023-12-01 PROCEDURE — 82607 VITAMIN B-12: CPT | Performed by: FAMILY MEDICINE

## 2023-12-01 PROCEDURE — 36415 COLL VENOUS BLD VENIPUNCTURE: CPT | Performed by: FAMILY MEDICINE

## 2023-12-01 PROCEDURE — 99215 OFFICE O/P EST HI 40 MIN: CPT | Performed by: FAMILY MEDICINE

## 2023-12-01 RX ORDER — CITALOPRAM HYDROBROMIDE 10 MG/1
10 TABLET ORAL DAILY
Qty: 30 TABLET | Refills: 1 | Status: SHIPPED | OUTPATIENT
Start: 2023-12-01

## 2023-12-01 NOTE — PROGRESS NOTES
Assessment & Plan     Weight loss  Anorexia  Acute exacerbation of chronic issue. Related to poor appetite. Down 24 lb from Feb. 2023, weight loss is now stabilizing though patient is requiring cannabis to maintain appetite. Extensive lab work-up has been unremarkable for any evidence of renal, liver, thyroid, inflammatory disease, infections or anemia. The only abnormality has been microscopic hematuria. Normal CXR. Cancer screening otherwise up-to-date. Next pap due in March 2022 after normal colposcopy in 2023. Some vaginal spotting, so cannot exclude endometrial proliferation without further imaging. Suspect that anxiety disorder is contributing. Obtaining vitamin D and B12 levels today at the patient's request, though suspect low yield for a breakthrough with either.  - Vitamin D deficiency screening; Future  - Trial of citalopram (CELEXA) 10 MG tablet; Take 1 tablet (10 mg) by mouth daily  - Vitamin B12; Future  - Vitamin D deficiency screening  - Vitamin B12  - Referral to urology for microscopic hematuria.   - Low threshold to obtain pelvic US to evaluate vaginal spotting if persistent.    [place] (generalized anxiety disorder)  Suspected based on history of worrying, irritability, sleep disruption. Symptoms better with cannabis, which she prefers to use, but cannot afford to keep up this habit. Could have an adjustment disorder component with losses earlier this year, but she denies that these are contributing now and reports always having some anxiety.   - Trial of citalopram. Will start low and gradually increase if tolerating. Has not previously tolerated sertraline and mirtazapine, though trials were only for a few days max.  - Counseled on risks of combustible cannabis use especially.  - Close follow up    Asymptomatic microscopic hematuria  Noted on labs obtained for unexplained weight loss. Persistent since August 2023 and prior urine cultures did not show any signs of infection. However, CT pelvis  showed diffuse bladder wall thickening suggestive of an infection. Now on day 4/5 of Bactrim for empiric UTI treatment. Symptoms are concerning for malignancy in the setting of unexplained weight loss. May be a contaminant given history of vaginal spotting?  - Finish antibiotics.   - Repeat urinalysis and culture in follow up in 2 weeks.  - Urology appointment scheduling help with our referral coordinator for further assessment.    Vaginal spotting  Several month vague history of thin vaginal discharge. Approximately 1 month history of some blood-tinged component with dark brown blood. This is now decreasing.  Previous work-up with wet prep and GC/CT testing in Sept was unremarkable. No other symptoms. History of high risk HPV with normal cytology and colposcopy in 2023 - due for repeat co-testing in March 2024.  - Will continue to follow closely  - Low threshold to obtain further imaging with a pelvic US if not fully resolved.    Essential hypertension  Significantly elevated blood pressure today. On amlodipine and consistently taking it. Suspect anxiety and frustration with unresolved reasons for weight loss and poor appetite are driving this.   - Continue amlodipine   - Track home BP - log provided  - Follow up in 2 weeks to reassess.     I spent a total of 68 minutes on the day of the visit.   Time spent by me doing chart review, history and exam, documentation and further activities per the note       Follow up in 2 weeks. Will repeat BP, urine testing, and reassess anxiety at that time.     Windy Vines MD  [facility]    Mike Sinha is a 53 year old, presenting for the following health issues:  Weight Check and Contraception (Depo)    HPI     She desperately wants to restart Depo so she can gain weight. She has been smoking cannabis to keep her weight up, about twice per day, $20. Weight is down 1 lb since. She cannot afford to keep taking cannabis for this issue and  "felt like Depo worked so well. Cannabis has helped tremendously with her sleep and helps her to worry less and to be less irritable. She agrees that she has chronically been dealing with these issues and that they are consistent with anxiety.    She has about 1-2 month history of vaginal spotting with dark blood. This has decreased in amount over the past week and is now just a very slight amount on a liner a few times per day. No abdominal or pelvic pain, dyspareunia, vaginal itching or irritation, dysuria, urinary frequency, bloating, or changes to her bowel habits. She is very concerned that her HPV is causing her symptoms also because she found on [employer] that advanced cervical cancer can cause weight loss and poor appetite.     She hasn't yet made an appointment with urology because all of the options given to her were in places that are either far ([place]) or that she hasn't heard of.     No further upper abdominal pain. Not taking PPI. No blood in stools or melena.        Objective    BP (!) 178/83   Pulse 90   Resp 20   Ht 1.676 m (5' 6\")   Wt 61.7 kg (136 lb)   SpO2 98%   BMI 21.95 kg/m    Body mass index is 21.95 kg/m .  Physical Exam   GENERAL: anxious, tearful at times, but alert and no distress  RESP: normal rate and effort  PSYCH: Mentation normal. Appears anxious at times, tears welling up when discussing her weight concerns.             " normal... Well appearing, well nourished, awake, alert, oriented  and in no apparent distress.

## 2023-12-01 NOTE — PATIENT INSTRUCTIONS
- We are checking your vitamin B12 and D today. If they are low, we will start supplements    - Make an appointment with the urologist to further evaluate the reason why you have been having microscopic blood in your urine    - Start taking the citalopram tomorrow morning. Call with any concerns or side effects so we can discuss them further. If you are tolerating the medication okay, keep taking it and we will reassess in 2 weeks. This will not help with anxiety immediately but should start to help with your appetite and anxiety over time as we increase the dose.    - We will reassess your urine for blood and your blood pressure at the next visit

## 2023-12-04 DIAGNOSIS — R63.4 WEIGHT LOSS: Primary | ICD-10-CM

## 2023-12-04 DIAGNOSIS — E55.9 HYPOVITAMINOSIS D: ICD-10-CM

## 2023-12-04 RX ORDER — SWAB
2 SWAB, NON-MEDICATED MISCELLANEOUS DAILY
Qty: 180 CAPSULE | Refills: 3 | Status: SHIPPED | OUTPATIENT
Start: 2023-12-04

## 2023-12-10 NOTE — TELEPHONE ENCOUNTER
MEDICAL RECORDS REQUEST   [facility]  Urology Clinic  [address] [place] [address]  PHONE: [phone]  Fax: [phone]        FUTURE VISIT INFORMATION                                                   Sudhakardottie ISAC Forbes, : 1970 scheduled for future visit at [facility]    APPOINTMENT INFORMATION:  Date: 2024  Provider:  Tristen Umaña PA-C  Reason for Visit/Diagnosis: Persistent asymptomatic microscopic hematuria without evidence for renal dysfunction or bladder infection    REFERRAL INFORMATION:  Referring provider:  Windy Vines MD in [facility]      RECORDS REQUESTED FOR VISIT                                                     NOTES  STATUS/DETAILS   OFFICE NOTE from referring provider  yes, 2023, 2023 -- Windy Vines MD in [facility]   MEDICATION LIST  yes   LABS     URINALYSIS (UA)  yes   IMAGES  yes, 2023 -- CT ABD PELVIS  2023, -- XR CHEST     PRE-VISIT CHECKLIST      Joint diagnostic appointment coordinated correctly          (ensure right order & amount of time) Yes   RECORD COLLECTION COMPLETE Yes

## 2023-12-11 ENCOUNTER — OFFICE VISIT (OUTPATIENT)
Dept: FAMILY MEDICINE | Facility: CLINIC | Age: 53
End: 2023-12-11
Payer: COMMERCIAL

## 2023-12-11 VITALS
HEIGHT: 67 IN | SYSTOLIC BLOOD PRESSURE: 163 MMHG | DIASTOLIC BLOOD PRESSURE: 97 MMHG | BODY MASS INDEX: 21.35 KG/M2 | RESPIRATION RATE: 20 BRPM | WEIGHT: 136 LBS | OXYGEN SATURATION: 97 % | TEMPERATURE: 98.4 F | HEART RATE: 95 BPM

## 2023-12-11 DIAGNOSIS — R31.29 MICROSCOPIC HEMATURIA: Primary | ICD-10-CM

## 2023-12-11 DIAGNOSIS — R63.4 WEIGHT LOSS: ICD-10-CM

## 2023-12-11 DIAGNOSIS — F41.1 GAD (GENERALIZED ANXIETY DISORDER): ICD-10-CM

## 2023-12-11 DIAGNOSIS — I10 ESSENTIAL HYPERTENSION: ICD-10-CM

## 2023-12-11 LAB
AMORPH CRY #/AREA URNS HPF: ABNORMAL /HPF
RBC URINE: 4 /HPF
SQUAMOUS EPITHELIAL: 1 /HPF
WBC URINE: 4 /HPF

## 2023-12-11 PROCEDURE — 81015 MICROSCOPIC EXAM OF URINE: CPT | Performed by: FAMILY MEDICINE

## 2023-12-11 PROCEDURE — 99214 OFFICE O/P EST MOD 30 MIN: CPT | Performed by: FAMILY MEDICINE

## 2023-12-11 RX ORDER — BENAZEPRIL HYDROCHLORIDE 10 MG/1
10 TABLET ORAL DAILY
Qty: 30 TABLET | Refills: 1 | Status: SHIPPED | OUTPATIENT
Start: 2023-12-11 | End: 2023-12-29

## 2023-12-11 ASSESSMENT — ANXIETY QUESTIONNAIRES
6. BECOMING EASILY ANNOYED OR IRRITABLE: SEVERAL DAYS
GAD7 TOTAL SCORE: 2
3. WORRYING TOO MUCH ABOUT DIFFERENT THINGS: SEVERAL DAYS
2. NOT BEING ABLE TO STOP OR CONTROL WORRYING: NOT AT ALL
1. FEELING NERVOUS, ANXIOUS, OR ON EDGE: NOT AT ALL
7. FEELING AFRAID AS IF SOMETHING AWFUL MIGHT HAPPEN: NOT AT ALL
GAD7 TOTAL SCORE: 2
5. BEING SO RESTLESS THAT IT IS HARD TO SIT STILL: NOT AT ALL

## 2023-12-11 ASSESSMENT — PATIENT HEALTH QUESTIONNAIRE - PHQ9
SUM OF ALL RESPONSES TO PHQ QUESTIONS 1-9: 0
5. POOR APPETITE OR OVEREATING: NOT AT ALL

## 2023-12-11 NOTE — PATIENT INSTRUCTIONS
- Keep taking the amlodipine and add in the benazepril one tablet each day.   - Keep checking your blood pressures about once per day at least 30 min. after taking your meds or longer.  - We will check your blood test in about 2 weeks to make sure that your potassium stays in a healthy range with the new benazepril medication.   - Continue on the acid medication (omeprazole) if you think that it is helping.  -  the citalopram and give it a try.   - For the vitamin D, it's called cholecalciferol and you should be on 800 units per day. That's the best maintenance.

## 2023-12-11 NOTE — PROGRESS NOTES
Assessment & Plan     Microscopic hematuria  Noted on labs obtained for unexplained weight loss in summer 2023. Asymptomatic. No evidence of infection on repeat urine culture. CT pelvis in November 2023 showed diffuse bladder wall thickening suggestive of an infection. Completed empiric Bactrim for UTI treatment. Will be seeing urology in Jan. 2024 for further assessment. Likely too early for resolution if inflammatory but will obtain urine microscopy per patient to reassess.  - Urine Microscopic Exam; Future  - Urine Microscopic Exam  - Urology in Jan. 2024.    Essential hypertension  BP remains above goal of < 140/90 with home measurements on 10 mg amlodipine alone. Will add back in ACE-I with plans to move towards a combination pill.   - Continue amlodipine.  - Start benazepril (LOTENSIN) 10 MG tablet; Take 1 tablet (10 mg) by mouth daily  - Ongoing home BP measurements with follow up in 2-3 weeks.    Weight loss  Acute exacerbation of chronic issue. Related to poor appetite. Down 24 lb from Feb. 2023, weight loss is now stabilizing though patient is requiring cannabis to maintain appetite. Extensive lab and CXR work-up has been unremarkable aside from asymptomatic microscopic hematuria. Cancer screening otherwise up-to-date. Next pap due in March 2024 after normal colposcopy in 2023. Some vaginal spotting, so cannot exclude endometrial proliferation without further imaging.   - Continue omeprazole for now. 8 week trial with plans to deescalate therapy.  - Urged beginning citalopram trial. She will consider.  - Continue regular small meals.    JAMAICA (generalized anxiety disorder)  Suspected based on history of worrying, irritability, sleep disruption. JAMAICA of 2 today. Symptoms better with cannabis, which she prefers to use, but cannot afford to keep up this habit. Could have an adjustment disorder component with losses earlier this year, but she denies that these are contributing now and reports always having some  anxiety.   - Urged trial of citalopram as above  - Close follow up.    I spent a total of 38 minutes on the day of the visit.   Time spent by me doing chart review, history and exam, documentation and further activities per the note       Follow up in 2-3 weeks or sooner. BMP needed to reassess potassium at that visit.    Windy Vines MD  [facility] [place] Subjective Noverta is a 53 year old, presenting for the following health issues:  Weight Loss        12/11/2023     8:59 AM   Additional Questions   Roomed by Rianna   Accompanied by self       HPI       - Weight loss and appetite. Here to follow up. Improving appetite. No further weight loss. Continues to use cannabis regularly for this issue. Hasn't started the citalopram yet because there was reportedly an issue at the pharmacy. I don't see a prior authorization and it is stated as having already been dispensed. She did start taking omeprazole daily and says that this is providing her with relief of intermittent upper abdominal pain.     - Blood pressure elevated today. She has been checking her blood pressure. About an hour after taking her amlodipine, she is recording BP in the systolic 130-140s/80-98. Systolics are generally close to goal of <140, but her diastolic is most often in the mid to higher 90s. She was previously on hydrochlorothiazide (but discontinued it due to concerns of side effects over the summer) and has also taking lisinopril and benazepril before. Unclear if she has had any side effects with these. No chest pain, shortness of breath, lightheadedness.     - Here today to follow up on microscopic hematuria. Completed antibiotics late early December. Urology follow up will be in January 29th for further assessment. No urinary symptoms. Would like to recheck for blood today.          Objective    BP (!) 163/97 (BP Location: Left arm, Patient Position: Sitting, Cuff Size: Adult Regular)   Pulse 95   Temp  "98.4  F (36.9  C) (Oral)   Resp 20   Ht 1.71 m (5' 7.32\")   Wt 61.7 kg (136 lb)   SpO2 97%   BMI 21.10 kg/m    Body mass index is 21.1 kg/m .  Physical Exam   GENERAL: healthy, alert and no distress  ABDOMEN: soft, nontender, no hepatosplenomegaly, no masses and bowel sounds normal  MS: no gross musculoskeletal defects noted, no edema  PSYCH: Mentation within normal limits. Affect upbeat, consistent with congruent mood.          8/28/2023     1:23 PM 10/30/2023    10:04 AM 12/11/2023    10:00 AM   PHQ   PHQ-9 Total Score 6 1 0   Q9: Thoughts of better off dead/self-harm past 2 weeks Not at all Not at all Not at all         10/17/2017     2:57 PM 12/11/2023    10:00 AM   JAMAICA-7 SCORE   Total Score 9 2           "

## 2023-12-29 ENCOUNTER — OFFICE VISIT (OUTPATIENT)
Dept: FAMILY MEDICINE | Facility: CLINIC | Age: 53
End: 2023-12-29
Payer: COMMERCIAL

## 2023-12-29 VITALS
DIASTOLIC BLOOD PRESSURE: 85 MMHG | WEIGHT: 135 LBS | HEIGHT: 67 IN | BODY MASS INDEX: 21.19 KG/M2 | OXYGEN SATURATION: 99 % | HEART RATE: 81 BPM | TEMPERATURE: 98 F | SYSTOLIC BLOOD PRESSURE: 156 MMHG | RESPIRATION RATE: 16 BRPM

## 2023-12-29 DIAGNOSIS — I10 ESSENTIAL HYPERTENSION: Primary | ICD-10-CM

## 2023-12-29 DIAGNOSIS — R31.29 MICROSCOPIC HEMATURIA: ICD-10-CM

## 2023-12-29 LAB
ALBUMIN UR-MCNC: NEGATIVE MG/DL
ANION GAP SERPL CALCULATED.3IONS-SCNC: 11 MMOL/L (ref 7–15)
APPEARANCE UR: CLEAR
BACTERIA #/AREA URNS HPF: ABNORMAL /HPF
BILIRUB UR QL STRIP: NEGATIVE
BUN SERPL-MCNC: 12 MG/DL (ref 6–20)
CALCIUM SERPL-MCNC: 9.5 MG/DL (ref 8.6–10)
CHLORIDE SERPL-SCNC: 101 MMOL/L (ref 98–107)
COLOR UR AUTO: YELLOW
CREAT SERPL-MCNC: 0.62 MG/DL (ref 0.51–0.95)
DEPRECATED HCO3 PLAS-SCNC: 29 MMOL/L (ref 22–29)
EGFRCR SERPLBLD CKD-EPI 2021: >90 ML/MIN/1.73M2
GLUCOSE SERPL-MCNC: 93 MG/DL (ref 70–99)
GLUCOSE UR STRIP-MCNC: NEGATIVE MG/DL
HGB UR QL STRIP: ABNORMAL
KETONES UR STRIP-MCNC: NEGATIVE MG/DL
LEUKOCYTE ESTERASE UR QL STRIP: ABNORMAL
NITRATE UR QL: NEGATIVE
PH UR STRIP: 8.5 [PH] (ref 5–7)
POTASSIUM SERPL-SCNC: 3.4 MMOL/L (ref 3.4–5.3)
RBC #/AREA URNS AUTO: ABNORMAL /HPF
SODIUM SERPL-SCNC: 141 MMOL/L (ref 135–145)
SP GR UR STRIP: 1.02 (ref 1–1.03)
SQUAMOUS #/AREA URNS AUTO: ABNORMAL /LPF
UROBILINOGEN UR STRIP-ACNC: 1 E.U./DL
WBC #/AREA URNS AUTO: ABNORMAL /HPF

## 2023-12-29 PROCEDURE — 99214 OFFICE O/P EST MOD 30 MIN: CPT | Mod: GC

## 2023-12-29 PROCEDURE — 81001 URINALYSIS AUTO W/SCOPE: CPT

## 2023-12-29 PROCEDURE — 80048 BASIC METABOLIC PNL TOTAL CA: CPT

## 2023-12-29 PROCEDURE — 36415 COLL VENOUS BLD VENIPUNCTURE: CPT

## 2023-12-29 RX ORDER — BENAZEPRIL HYDROCHLORIDE 20 MG/1
20 TABLET ORAL DAILY
Qty: 30 TABLET | Refills: 0 | Status: SHIPPED | OUTPATIENT
Start: 2023-12-29

## 2023-12-29 NOTE — PROGRESS NOTES
Assessment & Plan     Essential hypertension  Here today after visiting the dentist and was informed her amlodipine was causing changes in her gums. She is currently on amlodipine 10mg and recently started on benazepril 10mg. Will check BMP today. Will plan to stop amlodipine and increase Benazepril to 20mg daily. She has follow up scheduled early in January.  - benazepril (LOTENSIN) 20 MG tablet; Take 1 tablet (20 mg) by mouth daily  - Basic metabolic panel  - UA Macroscopic with reflex to Microscopic and Culture  - UA Microscopic with Reflex to Culture    Microscopic hematuria  Noted in the past on work-up for unintended weight loss. Plans to recheck to see if this has cleared. Will recheck today. Also has plans with urology.  - UA Macroscopic with reflex to Microscopic and Culture  - UA Microscopic with Reflex to Culture    Of note, patient was inquiring about another depo provera injection. Discussed that she has had a detailed conversation with Dr. Vines in the past and I am not comfortable giving today as I do not see an indication for it and she is undergoing a work-up for vaginal symptoms. Will defer to further discussion with Dr. Vines at her next visit.    Follow up early January w/ Dr. Mohinder Ortiz MD  [facility] [place]    Mike Sinha is a 53 year old, presenting for the following health issues:  Recheck Medication (Follow up medication: Amoldipine causing mouth issues)    HPI     Concerns regarding blood pressure medication  - went to the dentist today and was told her that her gums were abnormal due to amlodipine  - now has to have various dental procedures completed (unclear exact diagnosis)  - she has been on amlodipine 10mg for 7-8 years  - she has been working with Dr. Vines regarding blood pressure control  - was recently started on Benazepril 10mg earlier in December  - she also checks her BP at home  - today is mildly above goal at 156/85  -  "noted to have microscopic hematuria, expecting another test today to see if cleared    Also inquiring about depo for her vaginal symptoms  Does not endorse periods since stopping    Review of Systems   Constitutional, HEENT, cardiovascular, pulmonary, gi and gu systems are negative, except as otherwise noted.      Objective    BP (!) 156/85   Pulse 81   Temp 98  F (36.7  C) (Oral)   Resp 16   Ht 1.702 m (5' 7\")   Wt 61.2 kg (135 lb)   SpO2 99%   BMI 21.14 kg/m    Body mass index is 21.14 kg/m .  Physical Exam   Gen: Pleasant. No distress.    HEENT: MMM.   Neck: No overt asymmetry.   CV: Appears well-perfused. RRR. No murmur.   Resp: Breathing comfortably on room air. Lungs clear to auscultation bilaterally without wheeze or crackle.   Abd: Non-distended, non-tender.   Ext: No edema or overt asymmetry/deformity.   Skin: No overt rash on easily visualized skin.   Neuro: Non-focal.   Psych: Calm.        "

## 2024-01-15 ENCOUNTER — PATIENT OUTREACH (OUTPATIENT)
Dept: CARE COORDINATION | Facility: CLINIC | Age: 54
End: 2024-01-15

## 2024-01-29 ENCOUNTER — PRE VISIT (OUTPATIENT)
Dept: UROLOGY | Facility: CLINIC | Age: 54
End: 2024-01-29

## 2024-09-24 ENCOUNTER — PATIENT OUTREACH (OUTPATIENT)
Dept: FAMILY MEDICINE | Facility: CLINIC | Age: 54
End: 2024-09-24

## 2024-09-24 NOTE — TELEPHONE ENCOUNTER
Patient Quality Outreach    Patient is due for the following:   Hypertension -  Hypertension follow-up visit    Next Steps:   Patient not available.  Left message for patient to call back    Type of outreach:    Phone, left message for patient/parent to call back.    Next Steps:  Reach out within 90 days via Phone.    Max number of attempts reached: No. Will try again in 90 days if patient still on fail list.    Questions for provider review:    None           Chandra Bullock CMA

## 2024-11-02 ENCOUNTER — HOSPITAL ENCOUNTER (EMERGENCY)
Facility: CLINIC | Age: 54
Discharge: HOME | End: 2024-11-02
Admitting: PHYSICIAN ASSISTANT

## 2024-11-02 VITALS
HEIGHT: 67 IN | HEART RATE: 76 BPM | DIASTOLIC BLOOD PRESSURE: 89 MMHG | SYSTOLIC BLOOD PRESSURE: 167 MMHG | OXYGEN SATURATION: 100 % | WEIGHT: 135 LBS | BODY MASS INDEX: 21.19 KG/M2 | TEMPERATURE: 98.4 F | RESPIRATION RATE: 16 BRPM

## 2024-11-02 DIAGNOSIS — N39.0 UTI (URINARY TRACT INFECTION): ICD-10-CM

## 2024-11-02 DIAGNOSIS — I10 UNCONTROLLED HYPERTENSION: ICD-10-CM

## 2024-11-02 DIAGNOSIS — R11.2 NAUSEA AND VOMITING: ICD-10-CM

## 2024-11-02 LAB
ALBUMIN SERPL BCG-MCNC: 5 G/DL (ref 3.5–5.2)
ALBUMIN UR-MCNC: 10 MG/DL
ALP SERPL-CCNC: 107 U/L (ref 40–150)
ALT SERPL W P-5'-P-CCNC: 27 U/L (ref 0–50)
ANION GAP SERPL CALCULATED.3IONS-SCNC: 16 MMOL/L (ref 7–15)
APPEARANCE UR: CLEAR
AST SERPL W P-5'-P-CCNC: 33 U/L (ref 0–45)
ATRIAL RATE - MUSE: 111 BPM
ATRIAL RATE - MUSE: 73 BPM
BASOPHILS # BLD AUTO: 0 10E3/UL (ref 0–0.2)
BASOPHILS NFR BLD AUTO: 0 %
BILIRUB SERPL-MCNC: 0.7 MG/DL
BILIRUB UR QL STRIP: NEGATIVE
BUN SERPL-MCNC: 15.5 MG/DL (ref 6–20)
CALCIUM SERPL-MCNC: 10.4 MG/DL (ref 8.8–10.4)
CHLORIDE SERPL-SCNC: 96 MMOL/L (ref 98–107)
COLOR UR AUTO: ABNORMAL
CREAT SERPL-MCNC: 1.01 MG/DL (ref 0.51–0.95)
DIASTOLIC BLOOD PRESSURE - MUSE: 98 MMHG
DIASTOLIC BLOOD PRESSURE - MUSE: NORMAL MMHG
EGFRCR SERPLBLD CKD-EPI 2021: 65 ML/MIN/1.73M2
EOSINOPHIL # BLD AUTO: 0 10E3/UL (ref 0–0.7)
EOSINOPHIL NFR BLD AUTO: 1 %
ERYTHROCYTE [DISTWIDTH] IN BLOOD BY AUTOMATED COUNT: 15.1 % (ref 10–15)
FLUAV RNA SPEC QL NAA+PROBE: NEGATIVE
FLUBV RNA RESP QL NAA+PROBE: NEGATIVE
GLUCOSE SERPL-MCNC: 94 MG/DL (ref 70–99)
GLUCOSE UR STRIP-MCNC: NEGATIVE MG/DL
HCG SERPL QL: NEGATIVE
HCO3 SERPL-SCNC: 28 MMOL/L (ref 22–29)
HCT VFR BLD AUTO: 45.7 % (ref 35–47)
HGB BLD-MCNC: 15.5 G/DL (ref 11.7–15.7)
HGB UR QL STRIP: ABNORMAL
HYALINE CASTS: 4 /LPF
IMM GRANULOCYTES # BLD: 0 10E3/UL
IMM GRANULOCYTES NFR BLD: 0 %
INTERPRETATION ECG - MUSE: NORMAL
INTERPRETATION ECG - MUSE: NORMAL
KETONES UR STRIP-MCNC: 10 MG/DL
LEUKOCYTE ESTERASE UR QL STRIP: ABNORMAL
LIPASE SERPL-CCNC: 33 U/L (ref 13–60)
LYMPHOCYTES # BLD AUTO: 2.6 10E3/UL (ref 0.8–5.3)
LYMPHOCYTES NFR BLD AUTO: 32 %
MAGNESIUM SERPL-MCNC: 2.1 MG/DL (ref 1.7–2.3)
MCH RBC QN AUTO: 27.5 PG (ref 26.5–33)
MCHC RBC AUTO-ENTMCNC: 33.9 G/DL (ref 31.5–36.5)
MCV RBC AUTO: 81 FL (ref 78–100)
MONOCYTES # BLD AUTO: 0.6 10E3/UL (ref 0–1.3)
MONOCYTES NFR BLD AUTO: 7 %
MUCOUS THREADS #/AREA URNS LPF: PRESENT /LPF
NEUTROPHILS # BLD AUTO: 5 10E3/UL (ref 1.6–8.3)
NEUTROPHILS NFR BLD AUTO: 60 %
NITRATE UR QL: NEGATIVE
NRBC # BLD AUTO: 0 10E3/UL
NRBC BLD AUTO-RTO: 0 /100
P AXIS - MUSE: 83 DEGREES
P AXIS - MUSE: NORMAL DEGREES
PH UR STRIP: 5 [PH] (ref 5–7)
PLATELET # BLD AUTO: 363 10E3/UL (ref 150–450)
POTASSIUM SERPL-SCNC: 3.6 MMOL/L (ref 3.4–5.3)
PR INTERVAL - MUSE: 186 MS
PR INTERVAL - MUSE: NORMAL MS
PROT SERPL-MCNC: 8.3 G/DL (ref 6.4–8.3)
QRS DURATION - MUSE: 74 MS
QRS DURATION - MUSE: 76 MS
QT - MUSE: 432 MS
QT - MUSE: 474 MS
QTC - MUSE: 475 MS
QTC - MUSE: 644 MS
R AXIS - MUSE: 63 DEGREES
R AXIS - MUSE: 87 DEGREES
RBC # BLD AUTO: 5.63 10E6/UL (ref 3.8–5.2)
RBC URINE: 8 /HPF
RSV RNA SPEC NAA+PROBE: NEGATIVE
SARS-COV-2 RNA RESP QL NAA+PROBE: NEGATIVE
SODIUM SERPL-SCNC: 140 MMOL/L (ref 135–145)
SP GR UR STRIP: 1.01 (ref 1–1.03)
SQUAMOUS EPITHELIAL: 2 /HPF
SYSTOLIC BLOOD PRESSURE - MUSE: 177 MMHG
SYSTOLIC BLOOD PRESSURE - MUSE: NORMAL MMHG
T AXIS - MUSE: 66 DEGREES
T AXIS - MUSE: 85 DEGREES
TROPONIN T SERPL HS-MCNC: 7 NG/L
TROPONIN T SERPL HS-MCNC: 8 NG/L
TSH SERPL DL<=0.005 MIU/L-ACNC: 0.75 UIU/ML (ref 0.3–4.2)
UROBILINOGEN UR STRIP-MCNC: <2 MG/DL
VENTRICULAR RATE- MUSE: 111 BPM
VENTRICULAR RATE- MUSE: 73 BPM
WBC # BLD AUTO: 8.2 10E3/UL (ref 4–11)
WBC URINE: 45 /HPF

## 2024-11-02 PROCEDURE — 82435 ASSAY OF BLOOD CHLORIDE: CPT | Performed by: EMERGENCY MEDICINE

## 2024-11-02 PROCEDURE — 87637 SARSCOV2&INF A&B&RSV AMP PRB: CPT | Performed by: PHYSICIAN ASSISTANT

## 2024-11-02 PROCEDURE — 36415 COLL VENOUS BLD VENIPUNCTURE: CPT | Performed by: EMERGENCY MEDICINE

## 2024-11-02 PROCEDURE — 96376 TX/PRO/DX INJ SAME DRUG ADON: CPT

## 2024-11-02 PROCEDURE — 84443 ASSAY THYROID STIM HORMONE: CPT | Performed by: EMERGENCY MEDICINE

## 2024-11-02 PROCEDURE — 84484 ASSAY OF TROPONIN QUANT: CPT | Performed by: EMERGENCY MEDICINE

## 2024-11-02 PROCEDURE — 81003 URINALYSIS AUTO W/O SCOPE: CPT | Performed by: PHYSICIAN ASSISTANT

## 2024-11-02 PROCEDURE — 258N000003 HC RX IP 258 OP 636: Performed by: PHYSICIAN ASSISTANT

## 2024-11-02 PROCEDURE — 85025 COMPLETE CBC W/AUTO DIFF WBC: CPT | Performed by: EMERGENCY MEDICINE

## 2024-11-02 PROCEDURE — 87086 URINE CULTURE/COLONY COUNT: CPT | Performed by: PHYSICIAN ASSISTANT

## 2024-11-02 PROCEDURE — 83690 ASSAY OF LIPASE: CPT | Performed by: EMERGENCY MEDICINE

## 2024-11-02 PROCEDURE — 36415 COLL VENOUS BLD VENIPUNCTURE: CPT | Performed by: PHYSICIAN ASSISTANT

## 2024-11-02 PROCEDURE — 93005 ELECTROCARDIOGRAM TRACING: CPT | Performed by: PHYSICIAN ASSISTANT

## 2024-11-02 PROCEDURE — 96361 HYDRATE IV INFUSION ADD-ON: CPT

## 2024-11-02 PROCEDURE — 96365 THER/PROPH/DIAG IV INF INIT: CPT

## 2024-11-02 PROCEDURE — 93005 ELECTROCARDIOGRAM TRACING: CPT | Performed by: EMERGENCY MEDICINE

## 2024-11-02 PROCEDURE — 84703 CHORIONIC GONADOTROPIN ASSAY: CPT | Performed by: PHYSICIAN ASSISTANT

## 2024-11-02 PROCEDURE — 99284 EMERGENCY DEPT VISIT MOD MDM: CPT | Mod: 25

## 2024-11-02 PROCEDURE — 83735 ASSAY OF MAGNESIUM: CPT | Performed by: EMERGENCY MEDICINE

## 2024-11-02 PROCEDURE — 250N000011 HC RX IP 250 OP 636: Performed by: PHYSICIAN ASSISTANT

## 2024-11-02 RX ORDER — CEFTRIAXONE 1 G/1
1 INJECTION, POWDER, FOR SOLUTION INTRAMUSCULAR; INTRAVENOUS ONCE
Status: COMPLETED | OUTPATIENT
Start: 2024-11-02 | End: 2024-11-02

## 2024-11-02 RX ORDER — ONDANSETRON 4 MG/1
4 TABLET, ORALLY DISINTEGRATING ORAL EVERY 8 HOURS PRN
Qty: 10 TABLET | Refills: 0 | Status: SHIPPED | OUTPATIENT
Start: 2024-11-02 | End: 2024-11-05

## 2024-11-02 RX ORDER — CEFDINIR 300 MG/1
300 CAPSULE ORAL 2 TIMES DAILY
Qty: 14 CAPSULE | Refills: 0 | Status: SHIPPED | OUTPATIENT
Start: 2024-11-02 | End: 2024-11-09

## 2024-11-02 RX ORDER — ONDANSETRON 2 MG/ML
4 INJECTION INTRAMUSCULAR; INTRAVENOUS ONCE
Status: COMPLETED | OUTPATIENT
Start: 2024-11-02 | End: 2024-11-02

## 2024-11-02 RX ORDER — BENAZEPRIL HYDROCHLORIDE 40 MG/1
40 TABLET ORAL DAILY
Qty: 14 TABLET | Refills: 0 | Status: SHIPPED | OUTPATIENT
Start: 2024-11-02 | End: 2024-11-16

## 2024-11-02 RX ADMIN — SODIUM CHLORIDE 500 ML: 9 INJECTION, SOLUTION INTRAVENOUS at 15:57

## 2024-11-02 RX ADMIN — ONDANSETRON 4 MG: 2 INJECTION INTRAMUSCULAR; INTRAVENOUS at 15:58

## 2024-11-02 RX ADMIN — CEFTRIAXONE 1 G: 1 INJECTION, POWDER, FOR SOLUTION INTRAMUSCULAR; INTRAVENOUS at 18:40

## 2024-11-02 ASSESSMENT — ACTIVITIES OF DAILY LIVING (ADL)
ADLS_ACUITY_SCORE: 0

## 2024-11-02 ASSESSMENT — COLUMBIA-SUICIDE SEVERITY RATING SCALE - C-SSRS
1. IN THE PAST MONTH, HAVE YOU WISHED YOU WERE DEAD OR WISHED YOU COULD GO TO SLEEP AND NOT WAKE UP?: NO
6. HAVE YOU EVER DONE ANYTHING, STARTED TO DO ANYTHING, OR PREPARED TO DO ANYTHING TO END YOUR LIFE?: NO
2. HAVE YOU ACTUALLY HAD ANY THOUGHTS OF KILLING YOURSELF IN THE PAST MONTH?: NO

## 2024-11-02 NOTE — ED PROVIDER NOTES
Emergency Department Encounter   NAME: Bharath Forbes ; AGE: 54 year old female ; YOB: 1970 ; MRN: [id number] ; PCP: Samuel King   ED PROVIDER: Daija Pepe PA-C    Evaluation Date & Time:   No admission date for patient encounter.    CHIEF COMPLAINT:  Emesis and Hypertension      Impression and Plan   MDM: Bharath Forbes is a 54 year old female who presents to the ED for evaluation of vomiting.  The patient presents to the emergency department for evaluation of nausea and vomiting that started yesterday as well as feeling some palpitations with a heart rate up to 105, and elevated blood pressure readings at home.  Upon arrival to the ER, she is generally well-appearing and nontoxic, in no acute distress.  Her initial blood pressure was 169/92 and she is mildly tachycardic to 110 though vitally stable.  She does have dry mucous membranes.  Considered broad differential including dehydration, metabolic derangements, KIMBERYL, UTI, pyelonephritis, appendicitis, SBO, atypical ACS. We discussed plan for EKG, UA, and labs - 500cc fluid bolus and zofran ordered for symptomatic relief. Her abdominal exam is completely benign without any reproducible tenderness, distention, and certainly no evidence of peritonitis.  Very low suspicion for obstruction or emergent or surgical pathology in the abdomen or pelvis.  Given this, imaging deferred at this time.  No headache or visual changes, and no neurologic symptoms and neuroexam is intact -no concern for CVA, spontaneous ICH, or PRESS.    EKG showed accelerated junctional rhythm with a heart rate of 111.  Nonspecific ST abnormality though not consistent with STEMI.  No concerning T wave abnormalities.  EKG calculated abnormal QTc at 644 though after further examination of the EKG, her QTc appears to be within normal limits and I suspect the computer is calculating with her P wave. No previous EKGs for comparison.  Potassium and magnesium are within normal  limits.  No near syncope or dizziness.  After patient had been in the department and heart rate has floated down into the 70s.  Repeat EKG shows PVCs though Qtc measurements seems more appropriate at 475.  Troponin x 2 returned within normal limits for age.  No concern at this time for ACS.  Labs notable for a very mild elevation in her creatinine to 1.01 which may be due to some mild dehydration treated with IV fluids.  Plan to have this rechecked in her primary care clinic.  UA with signs of infection with 500 leukocytes and 45 WBCs with minimal contamination and fits clinically with her history of urinary frequency yesterday.  I suspect that this is the likely source of her illness and vomiting.  She is afebrile, no signs of sepsis, no CVA tenderness.  I do not think she has a fulminant kidney infection, and she has not had any flank or lower abdominal pain to suggest obstructing stone.  No indication for imaging.  UTI may be ascending up the urinary tract given the nausea and will treat with a dose of IV Rocephin here in the ED and a prescription for cefdinir for home.  Urine culture sent and pending.  She has not had any vomiting in the department and is tolerating p.o.  Blood pressure improved to the 150s without intervention.  Reviewed her cardiology note from February 2024, and it was directed that she start chlorthalidone on top of her Benzepril.  When I discussed this with the patient she was unaware that she was supposed to continue her Benzepril and has not been taking it which is the likely source of her untreated hypertension. No signs of end organ dysfunction.  A 2-week prescription for the medicine was sent to her pharmacy, and she will contact her primary care provider to schedule a recheck in clinic in the next 2 weeks to have her blood pressure and symptoms rechecked at which time they can refill her annual prescription.  No patient is comfortable with plan for discharge and we reviewed concerning  signs and symptoms return to the ER and she verbalized understanding.  Discharged home in good condition.      *Patient examination and workup was initiated in triage due to inpatient hospital and Emergency Department bed shortage resulting in long waiting room wait times. Patient and/or guardian's consent was obtained.          Medical Decision Making  Obtained supplemental history:Supplemental history obtained?: No  Reviewed external records: External records reviewed?: Outpatient Record: Cardiology note on 2/1/2024  Care impacted by chronic illness:Hypertension  Did you consider but not order tests?: Work up considered but not performed and documented in chart, if applicable  Did you interpret images independently?: Independent interpretation of ECG and images noted in documentation, when applicable.  Consultation discussion with other provider:Did you involve another provider (consultant, , pharmacy, etc.)?: No  Discharge. I prescribed additional prescription strength medication(s) as charted. See documentation for any additional details.    MIPS: Not Applicable      ED COURSE:  3:26 PM I met and introduced myself to the patient. I gathered initial history and performed my physical exam. We discussed plan for initial workup.   7:03 PM I rechecked the patient and discussed results, discharge, follow up, and reasons to return to the ED.     At the conclusion of the encounter I discussed the results of all the tests and the disposition. The questions were answered. The patient or family acknowledged understanding and was agreeable with the care plan.    FINAL IMPRESSION:    ICD-10-CM    1. Nausea and vomiting  R11.2       2. UTI (urinary tract infection)  N39.0       3. Uncontrolled hypertension  I10             MEDICATIONS GIVEN IN THE EMERGENCY DEPARTMENT:  Medications   sodium chloride 0.9% BOLUS 500 mL (0 mLs Intravenous Stopped 11/2/24 7040)   ondansetron (ZOFRAN) injection 4 mg (4 mg Intravenous $Given  11/2/24 3513)   cefTRIAXone (ROCEPHIN) 1 g vial to attach to  mL bag for ADULTS or NS 50 mL bag for PEDS (1 g Intravenous $New Bag 11/2/24 0590)         NEW PRESCRIPTIONS STARTED AT TODAY'S ED VISIT:  New Prescriptions    BENAZEPRIL (LOTENSIN) 40 MG TABLET    Take 1 tablet (40 mg) by mouth daily for 14 days.    CEFDINIR (OMNICEF) 300 MG CAPSULE    Take 1 capsule (300 mg) by mouth 2 times daily for 7 days.    ONDANSETRON (ZOFRAN ODT) 4 MG ODT TAB    Take 1 tablet (4 mg) by mouth every 8 hours as needed for nausea.         HPI   Use of Intrepreter: N/A     Bharath ISAC Forbes is a 54 year old female with a pertinent history of HTN, prediabetes, who presents to the ED by private vehicle for evaluation of vomiting.     Per patient, she began feeling ill yesterday with nausea and vomiting.  She had vomiting throughout the day and in the evening she felt that her heart was racing, and checked her pulse at 105.  This heart rate is abnormal for her.  She spoke with a friend about her symptoms and they advised checking her blood pressure which ranged anywhere from 170 systolic with 1 BP recheck in the 200's systolic which caused her concern.  She has not had any vomiting today though has continued to feel nauseated and has not attempted to drink much due to concerns for vomiting.  She has not had any headache, vision changes, arm or leg weakness or numbness, chest pain or shortness of breath, belly pain or changes in her stool.  No recent sick contacts.    REVIEW OF SYSTEMS:  Pertinent positive and negative symptoms per HPI.       Medical History     Past Medical History:   Diagnosis Date    ASCUS with positive high risk HPV cervical 3/16/2021    HTN (hypertension)     Hypertension        Past Surgical History:   Procedure Laterality Date    NO HISTORY OF SURGERY      [facility] COLONOSCOPY W/WO BRUSH/WASH N/A 2/6/2020    Procedure: COLONOSCOPY;  Surgeon: Hector Renae III, MD;  [facility];  Service:  "Gastroenterology       Family History   Problem Relation Age of Onset    Hypertension Mother     Diabetes No family hx of     Coronary Artery Disease No family hx of     Breast Cancer No family hx of     Cancer - colorectal No family hx of     Ovarian Cancer No family hx of     Prostate Cancer No family hx of     Other Cancer No family hx of     Cerebrovascular Disease No family hx of     Thyroid Disease No family hx of     Asthma No family hx of        Social History     Tobacco Use    Smoking status: Never    Smokeless tobacco: Never   Substance Use Topics    Alcohol use: Yes     Alcohol/week: 0.0 standard drinks of alcohol     Comment: Ocassionally    Drug use: Yes     Types: Marijuana       benazepril (LOTENSIN) 40 MG tablet  cefdinir (OMNICEF) 300 MG capsule  ondansetron (ZOFRAN ODT) 4 MG ODT tab  benazepril (LOTENSIN) 20 MG tablet  citalopram (CELEXA) 10 MG tablet  omeprazole (PRILOSEC) 20 MG DR capsule  vitamin D3 (CHOLECALCIFEROL) 10 MCG (400 UNIT) capsule          Physical Exam     First Vitals:  Patient Vitals for the past 24 hrs:   BP Temp Temp src Pulse Resp SpO2 Height Weight   11/02/24 1800 (!) 159/91 -- -- 72 -- 100 % -- --   11/02/24 1730 (!) 171/93 -- -- 72 -- 99 % -- --   11/02/24 1715 (!) 172/109 -- -- 81 -- 100 % -- --   11/02/24 1645 (!) 179/104 -- -- 81 -- 98 % -- --   11/02/24 1324 (!) 169/92 98.4  F (36.9  C) Oral 110 16 97 % 1.702 m (5' 7\") 61.2 kg (135 lb)         PHYSICAL EXAM:   Physical Exam  Vitals reviewed.   Constitutional:       General: She is not in acute distress.     Appearance: She is not ill-appearing or toxic-appearing.   HENT:      Mouth/Throat:      Mouth: Mucous membranes are dry.   Eyes:      Extraocular Movements: Extraocular movements intact.      Conjunctiva/sclera: Conjunctivae normal.      Pupils: Pupils are equal, round, and reactive to light.   Cardiovascular:      Rate and Rhythm: Regular rhythm. Tachycardia present.      Pulses: Normal pulses.      Heart sounds: " Normal heart sounds. No murmur heard.  Pulmonary:      Effort: Pulmonary effort is normal. No respiratory distress.      Breath sounds: Normal breath sounds.   Abdominal:      General: Abdomen is flat. Bowel sounds are normal. There is no distension.      Palpations: Abdomen is soft.      Tenderness: There is no abdominal tenderness. There is no right CVA tenderness, left CVA tenderness, guarding or rebound.   Musculoskeletal:      Cervical back: Normal range of motion and neck supple. No rigidity.      Right lower leg: No edema.      Left lower leg: No edema.   Skin:     General: Skin is warm and dry.   Neurological:      Mental Status: She is alert and oriented to person, place, and time. Mental status is at baseline.      Comments: CN 3-12 intact. Normal speech. No facial asymmetry. Normal gait              Results     LAB:  All pertinent labs reviewed and interpreted  Labs Ordered and Resulted from Time of ED Arrival to Time of ED Departure   COMPREHENSIVE METABOLIC PANEL - Abnormal       Result Value    Sodium 140      Potassium 3.6      Carbon Dioxide (CO2) 28      Anion Gap 16 (*)     Urea Nitrogen 15.5      Creatinine 1.01 (*)     GFR Estimate 65      Calcium 10.4      Chloride 96 (*)     Glucose 94      Alkaline Phosphatase 107      AST 33      ALT 27      Protein Total 8.3      Albumin 5.0      Bilirubin Total 0.7     ROUTINE UA WITH MICROSCOPIC REFLEX TO CULTURE - Abnormal    Color Urine Light Yellow      Appearance Urine Clear      Glucose Urine Negative      Bilirubin Urine Negative      Ketones Urine 10 (*)     Specific Gravity Urine 1.015      Blood Urine 0.2 mg/dL (*)     pH Urine 5.0      Protein Albumin Urine 10 (*)     Urobilinogen Urine <2.0      Nitrite Urine Negative      Leukocyte Esterase Urine 500 Mitchell/uL (*)     Mucus Urine Present (*)     RBC Urine 8 (*)     WBC Urine 45 (*)     Squamous Epithelials Urine 2 (*)     Hyaline Casts Urine 4 (*)    CBC WITH PLATELETS AND DIFFERENTIAL - Abnormal     WBC Count 8.2      RBC Count 5.63 (*)     Hemoglobin 15.5      Hematocrit 45.7      MCV 81      MCH 27.5      MCHC 33.9      RDW 15.1 (*)     Platelet Count 363      % Neutrophils 60      % Lymphocytes 32      % Monocytes 7      % Eosinophils 1      % Basophils 0      % Immature Granulocytes 0      NRBCs per 100 WBC 0      Absolute Neutrophils 5.0      Absolute Lymphocytes 2.6      Absolute Monocytes 0.6      Absolute Eosinophils 0.0      Absolute Basophils 0.0      Absolute Immature Granulocytes 0.0      Absolute NRBCs 0.0     LIPASE - Normal    Lipase 33     TSH WITH FREE T4 REFLEX - Normal    TSH 0.75     MAGNESIUM - Normal    Magnesium 2.1     TROPONIN T, HIGH SENSITIVITY - Normal    Troponin T, High Sensitivity 7     HCG QUALITATIVE PREGNANCY - Normal    hCG Serum Qualitative Negative     INFLUENZA A/B, RSV, & SARS-COV2 PCR - Normal    Influenza A PCR Negative      Influenza B PCR Negative      RSV PCR Negative      SARS CoV2 PCR Negative     TROPONIN T, HIGH SENSITIVITY - Normal    Troponin T, High Sensitivity 8     URINE CULTURE       RADIOLOGY:  No orders to display         ECG:    Performed at: 11/02/2024 at 19:15    Impression: Sinus rhythm with occasional PVCs. Nonspecific T wave abnormality. Prolonged QT.    Rate: 73 bpm  Rhythm: Sinus rhythm with occasional PVCs  Axis: 63  QRS Interval: 76 ms  QTc Interval: 475 ms  ST Changes: Non-specific change in ST segment in Inferior leads. ST no longer depressed in anterior leads.  Comparison: When compared with ECG of 11/02/2024 at 13:30. Sinus rhythm has replaced Junctional rhythm. Vent. Rate has decreased by 38 bpm. Nonspecific T wave abnormality, worse in anterior leads.    Performed at: 11/02/2024 at 13:30    Impression: Accelerated Junctional rhythm. Nonspecific ST abnormality. Prolonged QT    Rate: 111 bpm  Rhythm: Accelerated junctional rhythm  Axis: 87  QRS Interval: 74 ms  QTc Interval: 644 ms  ST Changes: No ST changes  Comparison: No previous ECGs  available    EKG results reviewed and interpreted by Dr. Zuhair Rosas, ED MD.       Daija Pepe PA-C   Emergency Medicine   [facility] EMERGENCY ROOM       Daija Pepe PA-C  11/02/24 1927

## 2024-11-02 NOTE — DISCHARGE INSTRUCTIONS
As we discussed, for your nausea we will send you home with a prescription for Zofran.    I suspect a bladder infection as a source of your symptoms.  Please take the antibiotic cefdinir as prescribed.  We will call you if we need to change the antibiotic based on your urine culture.  If at any point you develop fever chills, flank or abdominal pain, uncontrolled vomiting or feel more ill please return to the ER for further evaluation.    As we discussed, I reviewed your cardiology note from February and they wanted you on 40 mg of benazepril daily along with the new prescription for chlorthalidone.  I sent a 2-week prescription for BenzePrO to your pharmacy.  Please reach out to your primary care provider tomorrow to have them refill this permanently, schedule follow-up in clinic within the next 2 weeks to have a blood pressure recheck.  If you develop severe headache, visual changes, confusion, arm or leg weakness or numbness, chest pain or shortness of breath please return to the ER for further evaluation.

## 2024-11-02 NOTE — ED TRIAGE NOTES
Patient presents to ED with emesis that began yesterday, having HTN today and feels like heart is racing, denies pain.  Hali Lares RN.......11/2/2024 1:26 PM     Triage Assessment (Adult)       Row Name 11/02/24 1325          Triage Assessment    Airway WDL WDL        Respiratory WDL    Respiratory WDL WDL        Skin Circulation/Temperature WDL    Skin Circulation/Temperature WDL WDL        Cardiac WDL    Cardiac WDL WDL        Peripheral/Neurovascular WDL    Peripheral Neurovascular WDL WDL        Cognitive/Neuro/Behavioral WDL    Cognitive/Neuro/Behavioral WDL WDL

## 2024-11-03 LAB — BACTERIA UR CULT: NORMAL

## 2024-11-03 NOTE — RESULT ENCOUNTER NOTE
Final urine culture report is negative.  Adult: Negative urine culture parameters per protocol: Any # urogenital hugo, single or mixed   [facility] Emergency Dept discharge antibiotic prescribed (If applicable): Cefdinir  Treatment recommendations per [facility] ED Lab Result Urine Culture protocol: No change in plan of care.

## 2024-11-06 ENCOUNTER — PATIENT OUTREACH (OUTPATIENT)
Dept: FAMILY MEDICINE | Facility: CLINIC | Age: 54
End: 2024-11-06

## 2024-11-06 NOTE — TELEPHONE ENCOUNTER
Patient Quality Outreach    Patient is due for the following:   Hypertension -  Hypertension follow-up visit    Next Steps:   Calling patient but patient not available. Can't leave message due to voicemail is not set up.    Type of outreach:    Calling patient but patient not available.    Next Steps:  Reach out within 90 days via Phone.    Max number of attempts reached: No. Will try again in 90 days if patient still on fail list.    Questions for provider review:    None           Chandra Bullock MA

## 2025-02-19 ENCOUNTER — APPOINTMENT (OUTPATIENT)
Dept: CT IMAGING | Facility: CLINIC | Age: 55
DRG: 281 | End: 2025-02-19
Attending: PHYSICIAN ASSISTANT

## 2025-02-19 ENCOUNTER — HOSPITAL ENCOUNTER (INPATIENT)
Facility: CLINIC | Age: 55
LOS: 1 days | Discharge: OTHER INSTITUTION | End: 2025-02-20
Attending: EMERGENCY MEDICINE

## 2025-02-19 DIAGNOSIS — R07.9 CHEST PAIN: ICD-10-CM

## 2025-02-19 DIAGNOSIS — R11.2 NAUSEA AND VOMITING: ICD-10-CM

## 2025-02-19 DIAGNOSIS — I21.4 NSTEMI (NON-ST ELEVATED MYOCARDIAL INFARCTION) (H): Primary | ICD-10-CM

## 2025-02-19 DIAGNOSIS — E87.6 HYPOKALEMIA: ICD-10-CM

## 2025-02-19 LAB
ALBUMIN SERPL BCG-MCNC: 4.6 G/DL (ref 3.5–5.2)
ALBUMIN UR-MCNC: NEGATIVE MG/DL
ALP SERPL-CCNC: 119 U/L (ref 40–150)
ALT SERPL W P-5'-P-CCNC: 31 U/L (ref 0–50)
ANION GAP SERPL CALCULATED.3IONS-SCNC: 12 MMOL/L (ref 7–15)
APPEARANCE UR: CLEAR
AST SERPL W P-5'-P-CCNC: 37 U/L (ref 0–45)
ATRIAL RATE - MUSE: 92 BPM
ATRIAL RATE - MUSE: 94 BPM
BILIRUB DIRECT SERPL-MCNC: 0.2 MG/DL (ref 0–0.3)
BILIRUB SERPL-MCNC: 0.4 MG/DL
BILIRUB UR QL STRIP: NEGATIVE
BUN SERPL-MCNC: 9.7 MG/DL (ref 6–20)
CALCIUM SERPL-MCNC: 9 MG/DL (ref 8.8–10.4)
CHLORIDE SERPL-SCNC: 99 MMOL/L (ref 98–107)
COLOR UR AUTO: COLORLESS
CREAT SERPL-MCNC: 0.74 MG/DL (ref 0.51–0.95)
D DIMER PPP FEU-MCNC: 1.04 UG/ML FEU (ref 0–0.5)
DIASTOLIC BLOOD PRESSURE - MUSE: NORMAL MMHG
DIASTOLIC BLOOD PRESSURE - MUSE: NORMAL MMHG
EGFRCR SERPLBLD CKD-EPI 2021: >90 ML/MIN/1.73M2
ERYTHROCYTE [DISTWIDTH] IN BLOOD BY AUTOMATED COUNT: 16.4 % (ref 10–15)
ERYTHROCYTE [DISTWIDTH] IN BLOOD BY AUTOMATED COUNT: 16.5 % (ref 10–15)
FLUAV RNA SPEC QL NAA+PROBE: NEGATIVE
FLUBV RNA RESP QL NAA+PROBE: NEGATIVE
GLUCOSE SERPL-MCNC: 133 MG/DL (ref 70–99)
GLUCOSE UR STRIP-MCNC: NEGATIVE MG/DL
HCO3 SERPL-SCNC: 28 MMOL/L (ref 22–29)
HCT VFR BLD AUTO: 36.5 % (ref 35–47)
HCT VFR BLD AUTO: 38.2 % (ref 35–47)
HGB BLD-MCNC: 12.2 G/DL (ref 11.7–15.7)
HGB BLD-MCNC: 12.6 G/DL (ref 11.7–15.7)
HGB UR QL STRIP: ABNORMAL
HOLD SPECIMEN: NORMAL
INTERPRETATION ECG - MUSE: NORMAL
INTERPRETATION ECG - MUSE: NORMAL
KETONES UR STRIP-MCNC: NEGATIVE MG/DL
LEUKOCYTE ESTERASE UR QL STRIP: ABNORMAL
LIPASE SERPL-CCNC: 90 U/L (ref 13–60)
MCH RBC QN AUTO: 27.3 PG (ref 26.5–33)
MCH RBC QN AUTO: 27.7 PG (ref 26.5–33)
MCHC RBC AUTO-ENTMCNC: 33 G/DL (ref 31.5–36.5)
MCHC RBC AUTO-ENTMCNC: 33.4 G/DL (ref 31.5–36.5)
MCV RBC AUTO: 83 FL (ref 78–100)
MCV RBC AUTO: 83 FL (ref 78–100)
NITRATE UR QL: NEGATIVE
P AXIS - MUSE: 85 DEGREES
P AXIS - MUSE: 88 DEGREES
PH UR STRIP: 8 [PH] (ref 5–7)
PLATELET # BLD AUTO: 272 10E3/UL (ref 150–450)
PLATELET # BLD AUTO: 297 10E3/UL (ref 150–450)
POTASSIUM SERPL-SCNC: 3.1 MMOL/L (ref 3.4–5.3)
PR INTERVAL - MUSE: 200 MS
PR INTERVAL - MUSE: 202 MS
PROT SERPL-MCNC: 7.6 G/DL (ref 6.4–8.3)
QRS DURATION - MUSE: 80 MS
QRS DURATION - MUSE: 86 MS
QT - MUSE: 392 MS
QT - MUSE: 400 MS
QTC - MUSE: 484 MS
QTC - MUSE: 500 MS
R AXIS - MUSE: 52 DEGREES
R AXIS - MUSE: 53 DEGREES
RADIOLOGIST FLAGS: NORMAL
RADIOLOGIST FLAGS: NORMAL
RBC # BLD AUTO: 4.4 10E6/UL (ref 3.8–5.2)
RBC # BLD AUTO: 4.61 10E6/UL (ref 3.8–5.2)
RBC URINE: 13 /HPF
RSV RNA SPEC NAA+PROBE: NEGATIVE
SARS-COV-2 RNA RESP QL NAA+PROBE: NEGATIVE
SODIUM SERPL-SCNC: 139 MMOL/L (ref 135–145)
SP GR UR STRIP: <1.005 (ref 1–1.03)
SQUAMOUS EPITHELIAL: <1 /HPF
SYSTOLIC BLOOD PRESSURE - MUSE: NORMAL MMHG
SYSTOLIC BLOOD PRESSURE - MUSE: NORMAL MMHG
T AXIS - MUSE: 98 DEGREES
T AXIS - MUSE: 99 DEGREES
TROPONIN T SERPL HS-MCNC: 108 NG/L
TROPONIN T SERPL HS-MCNC: 241 NG/L
UROBILINOGEN UR STRIP-MCNC: <2 MG/DL
VENTRICULAR RATE- MUSE: 92 BPM
VENTRICULAR RATE- MUSE: 94 BPM
WBC # BLD AUTO: 11.6 10E3/UL (ref 4–11)
WBC # BLD AUTO: 9 10E3/UL (ref 4–11)
WBC URINE: 6 /HPF

## 2025-02-19 PROCEDURE — 120N000001 HC R&B MED SURG/OB

## 2025-02-19 PROCEDURE — 250N000013 HC RX MED GY IP 250 OP 250 PS 637: Performed by: PHYSICIAN ASSISTANT

## 2025-02-19 PROCEDURE — 84484 ASSAY OF TROPONIN QUANT: CPT | Performed by: PHYSICIAN ASSISTANT

## 2025-02-19 PROCEDURE — 71275 CT ANGIOGRAPHY CHEST: CPT

## 2025-02-19 PROCEDURE — 80048 BASIC METABOLIC PNL TOTAL CA: CPT | Performed by: PHYSICIAN ASSISTANT

## 2025-02-19 PROCEDURE — 36415 COLL VENOUS BLD VENIPUNCTURE: CPT | Performed by: PHYSICIAN ASSISTANT

## 2025-02-19 PROCEDURE — 74177 CT ABD & PELVIS W/CONTRAST: CPT

## 2025-02-19 PROCEDURE — 82248 BILIRUBIN DIRECT: CPT | Performed by: PHYSICIAN ASSISTANT

## 2025-02-19 PROCEDURE — 85041 AUTOMATED RBC COUNT: CPT | Performed by: PHYSICIAN ASSISTANT

## 2025-02-19 PROCEDURE — 96374 THER/PROPH/DIAG INJ IV PUSH: CPT | Mod: 59

## 2025-02-19 PROCEDURE — 85014 HEMATOCRIT: CPT | Performed by: PHYSICIAN ASSISTANT

## 2025-02-19 PROCEDURE — 93005 ELECTROCARDIOGRAM TRACING: CPT | Performed by: PHYSICIAN ASSISTANT

## 2025-02-19 PROCEDURE — 250N000011 HC RX IP 250 OP 636: Performed by: PHYSICIAN ASSISTANT

## 2025-02-19 PROCEDURE — 93005 ELECTROCARDIOGRAM TRACING: CPT | Performed by: EMERGENCY MEDICINE

## 2025-02-19 PROCEDURE — 250N000011 HC RX IP 250 OP 636: Performed by: EMERGENCY MEDICINE

## 2025-02-19 PROCEDURE — 83690 ASSAY OF LIPASE: CPT | Performed by: PHYSICIAN ASSISTANT

## 2025-02-19 PROCEDURE — 85379 FIBRIN DEGRADATION QUANT: CPT | Performed by: PHYSICIAN ASSISTANT

## 2025-02-19 PROCEDURE — 99285 EMERGENCY DEPT VISIT HI MDM: CPT | Mod: 25

## 2025-02-19 PROCEDURE — 85027 COMPLETE CBC AUTOMATED: CPT | Performed by: EMERGENCY MEDICINE

## 2025-02-19 PROCEDURE — 99223 1ST HOSP IP/OBS HIGH 75: CPT | Performed by: INTERNAL MEDICINE

## 2025-02-19 PROCEDURE — 81003 URINALYSIS AUTO W/O SCOPE: CPT | Performed by: EMERGENCY MEDICINE

## 2025-02-19 PROCEDURE — 87637 SARSCOV2&INF A&B&RSV AMP PRB: CPT | Performed by: PHYSICIAN ASSISTANT

## 2025-02-19 PROCEDURE — 83735 ASSAY OF MAGNESIUM: CPT | Performed by: INTERNAL MEDICINE

## 2025-02-19 PROCEDURE — 80053 COMPREHEN METABOLIC PANEL: CPT | Performed by: PHYSICIAN ASSISTANT

## 2025-02-19 RX ORDER — NITROGLYCERIN 0.4 MG/1
0.4 TABLET SUBLINGUAL EVERY 5 MIN PRN
Status: DISCONTINUED | OUTPATIENT
Start: 2025-02-19 | End: 2025-02-19

## 2025-02-19 RX ORDER — NITROGLYCERIN 0.4 MG/1
0.4 TABLET SUBLINGUAL EVERY 5 MIN PRN
Status: DISCONTINUED | OUTPATIENT
Start: 2025-02-19 | End: 2025-02-20 | Stop reason: HOSPADM

## 2025-02-19 RX ORDER — HEPARIN SODIUM 10000 [USP'U]/100ML
0-5000 INJECTION, SOLUTION INTRAVENOUS CONTINUOUS
Status: DISCONTINUED | OUTPATIENT
Start: 2025-02-19 | End: 2025-02-20 | Stop reason: HOSPADM

## 2025-02-19 RX ORDER — ASPIRIN 325 MG
325 TABLET ORAL ONCE
Status: COMPLETED | OUTPATIENT
Start: 2025-02-19 | End: 2025-02-19

## 2025-02-19 RX ORDER — MORPHINE SULFATE 4 MG/ML
4 INJECTION, SOLUTION INTRAMUSCULAR; INTRAVENOUS ONCE
Status: COMPLETED | OUTPATIENT
Start: 2025-02-19 | End: 2025-02-19

## 2025-02-19 RX ORDER — ONDANSETRON 2 MG/ML
4 INJECTION INTRAMUSCULAR; INTRAVENOUS ONCE
Status: COMPLETED | OUTPATIENT
Start: 2025-02-19 | End: 2025-02-19

## 2025-02-19 RX ORDER — POTASSIUM CHLORIDE 1500 MG/1
40 TABLET, EXTENDED RELEASE ORAL ONCE
Status: COMPLETED | OUTPATIENT
Start: 2025-02-19 | End: 2025-02-19

## 2025-02-19 RX ORDER — OXYCODONE HYDROCHLORIDE 5 MG/1
5 TABLET ORAL ONCE
Status: COMPLETED | OUTPATIENT
Start: 2025-02-19 | End: 2025-02-19

## 2025-02-19 RX ORDER — HEPARIN SODIUM 10000 [USP'U]/100ML
0-5000 INJECTION, SOLUTION INTRAVENOUS CONTINUOUS
Status: DISCONTINUED | OUTPATIENT
Start: 2025-02-19 | End: 2025-02-19

## 2025-02-19 RX ORDER — IOPAMIDOL 755 MG/ML
90 INJECTION, SOLUTION INTRAVASCULAR ONCE
Status: COMPLETED | OUTPATIENT
Start: 2025-02-19 | End: 2025-02-19

## 2025-02-19 RX ADMIN — MORPHINE SULFATE 4 MG: 4 INJECTION, SOLUTION INTRAMUSCULAR; INTRAVENOUS at 23:19

## 2025-02-19 RX ADMIN — POTASSIUM CHLORIDE 40 MEQ: 1500 TABLET, EXTENDED RELEASE ORAL at 21:57

## 2025-02-19 RX ADMIN — ONDANSETRON 4 MG: 2 INJECTION, SOLUTION INTRAMUSCULAR; INTRAVENOUS at 20:49

## 2025-02-19 RX ADMIN — OXYCODONE 5 MG: 5 TABLET ORAL at 20:49

## 2025-02-19 RX ADMIN — HEPARIN SODIUM 750 UNITS/HR: 10000 INJECTION, SOLUTION INTRAVENOUS at 23:36

## 2025-02-19 RX ADMIN — IOPAMIDOL 75 ML: 755 INJECTION, SOLUTION INTRAVENOUS at 21:43

## 2025-02-19 RX ADMIN — ASPIRIN 325 MG ORAL TABLET 325 MG: 325 PILL ORAL at 21:57

## 2025-02-19 ASSESSMENT — COLUMBIA-SUICIDE SEVERITY RATING SCALE - C-SSRS
2. HAVE YOU ACTUALLY HAD ANY THOUGHTS OF KILLING YOURSELF IN THE PAST MONTH?: NO
6. HAVE YOU EVER DONE ANYTHING, STARTED TO DO ANYTHING, OR PREPARED TO DO ANYTHING TO END YOUR LIFE?: NO
1. IN THE PAST MONTH, HAVE YOU WISHED YOU WERE DEAD OR WISHED YOU COULD GO TO SLEEP AND NOT WAKE UP?: NO

## 2025-02-19 ASSESSMENT — ACTIVITIES OF DAILY LIVING (ADL): ADLS_ACUITY_SCORE: 41

## 2025-02-20 ENCOUNTER — HOSPITAL ENCOUNTER (INPATIENT)
Facility: HOSPITAL | Age: 55
LOS: 2 days | Discharge: HOME | DRG: 281 | End: 2025-02-22
Attending: INTERNAL MEDICINE | Admitting: INTERNAL MEDICINE

## 2025-02-20 ENCOUNTER — APPOINTMENT (OUTPATIENT)
Dept: CARDIOLOGY | Facility: CLINIC | Age: 55
DRG: 281 | End: 2025-02-20
Attending: INTERNAL MEDICINE

## 2025-02-20 VITALS
BODY MASS INDEX: 20.89 KG/M2 | HEART RATE: 77 BPM | WEIGHT: 130 LBS | TEMPERATURE: 98.5 F | RESPIRATION RATE: 22 BRPM | OXYGEN SATURATION: 100 % | HEIGHT: 66 IN | DIASTOLIC BLOOD PRESSURE: 74 MMHG | SYSTOLIC BLOOD PRESSURE: 140 MMHG

## 2025-02-20 DIAGNOSIS — I21.4 NSTEMI (NON-ST ELEVATED MYOCARDIAL INFARCTION) (H): ICD-10-CM

## 2025-02-20 DIAGNOSIS — R63.0 ANOREXIA: Primary | ICD-10-CM

## 2025-02-20 PROBLEM — R94.30 LOW LEFT VENTRICULAR EJECTION FRACTION: Status: ACTIVE | Noted: 2025-02-20

## 2025-02-20 PROBLEM — R63.4 WEIGHT LOSS, ABNORMAL: Status: ACTIVE | Noted: 2023-09-18

## 2025-02-20 PROBLEM — I51.81 TAKOTSUBO SYNDROME: Status: ACTIVE | Noted: 2025-02-20

## 2025-02-20 PROBLEM — F41.1 GENERALIZED ANXIETY DISORDER: Status: RESOLVED | Noted: 2025-02-20 | Resolved: 2025-02-20

## 2025-02-20 PROBLEM — R93.1 LOW LEFT VENTRICULAR EJECTION FRACTION: Status: ACTIVE | Noted: 2025-02-20

## 2025-02-20 PROBLEM — R07.9 CHEST PAIN: Status: RESOLVED | Noted: 2025-02-19 | Resolved: 2025-02-20

## 2025-02-20 PROBLEM — R73.03 PREDIABETES: Status: ACTIVE | Noted: 2023-09-18

## 2025-02-20 PROBLEM — F41.1 GENERALIZED ANXIETY DISORDER: Status: ACTIVE | Noted: 2025-02-20

## 2025-02-20 PROBLEM — R10.13 EPIGASTRIC PAIN: Status: ACTIVE | Noted: 2025-02-20

## 2025-02-20 LAB
ABO + RH BLD: NORMAL
ALBUMIN SERPL BCG-MCNC: 3.9 G/DL (ref 3.5–5.2)
ALBUMIN UR-MCNC: NEGATIVE MG/DL
ALP SERPL-CCNC: 88 U/L (ref 40–150)
ALT SERPL W P-5'-P-CCNC: 25 U/L (ref 0–50)
ANION GAP SERPL CALCULATED.3IONS-SCNC: 10 MMOL/L (ref 7–15)
APPEARANCE UR: CLEAR
AST SERPL W P-5'-P-CCNC: 34 U/L (ref 0–45)
ATRIAL RATE - MUSE: 61 BPM
BASOPHILS # BLD AUTO: 0 10E3/UL (ref 0–0.2)
BASOPHILS NFR BLD AUTO: 0 %
BILIRUB SERPL-MCNC: 0.5 MG/DL
BILIRUB UR QL STRIP: NEGATIVE
BLD GP AB SCN SERPL QL: NEGATIVE
BUN SERPL-MCNC: 8 MG/DL (ref 6–20)
CALCIUM SERPL-MCNC: 9 MG/DL (ref 8.8–10.4)
CHLORIDE SERPL-SCNC: 103 MMOL/L (ref 98–107)
CHOLEST SERPL-MCNC: 153 MG/DL
COLOR UR AUTO: ABNORMAL
CREAT SERPL-MCNC: 0.65 MG/DL (ref 0.51–0.95)
DIASTOLIC BLOOD PRESSURE - MUSE: NORMAL MMHG
EGFRCR SERPLBLD CKD-EPI 2021: >90 ML/MIN/1.73M2
EOSINOPHIL # BLD AUTO: 0 10E3/UL (ref 0–0.7)
EOSINOPHIL NFR BLD AUTO: 0 %
ERYTHROCYTE [DISTWIDTH] IN BLOOD BY AUTOMATED COUNT: 16.3 % (ref 10–15)
GLUCOSE SERPL-MCNC: 96 MG/DL (ref 70–99)
GLUCOSE UR STRIP-MCNC: NEGATIVE MG/DL
HCO3 SERPL-SCNC: 27 MMOL/L (ref 22–29)
HCT VFR BLD AUTO: 34.1 % (ref 35–47)
HDLC SERPL-MCNC: 72 MG/DL
HGB BLD-MCNC: 11.4 G/DL (ref 11.7–15.7)
HGB UR QL STRIP: ABNORMAL
IMM GRANULOCYTES # BLD: 0 10E3/UL
IMM GRANULOCYTES NFR BLD: 0 %
INTERPRETATION ECG - MUSE: NORMAL
KETONES UR STRIP-MCNC: ABNORMAL MG/DL
LDLC SERPL CALC-MCNC: 74 MG/DL
LEUKOCYTE ESTERASE UR QL STRIP: NEGATIVE
LVEF ECHO: NORMAL
LYMPHOCYTES # BLD AUTO: 1.9 10E3/UL (ref 0.8–5.3)
LYMPHOCYTES NFR BLD AUTO: 25 %
MAGNESIUM SERPL-MCNC: 1.8 MG/DL (ref 1.7–2.3)
MCH RBC QN AUTO: 27.6 PG (ref 26.5–33)
MCHC RBC AUTO-ENTMCNC: 33.4 G/DL (ref 31.5–36.5)
MCV RBC AUTO: 83 FL (ref 78–100)
MONOCYTES # BLD AUTO: 0.5 10E3/UL (ref 0–1.3)
MONOCYTES NFR BLD AUTO: 6 %
MUCOUS THREADS #/AREA URNS LPF: PRESENT /LPF
NEUTROPHILS # BLD AUTO: 5.1 10E3/UL (ref 1.6–8.3)
NEUTROPHILS NFR BLD AUTO: 68 %
NITRATE UR QL: NEGATIVE
NONHDLC SERPL-MCNC: 81 MG/DL
NRBC # BLD AUTO: 0 10E3/UL
NRBC BLD AUTO-RTO: 0 /100
P AXIS - MUSE: 75 DEGREES
PH UR STRIP: 6.5 [PH] (ref 5–7)
PLATELET # BLD AUTO: 255 10E3/UL (ref 150–450)
POTASSIUM SERPL-SCNC: 3.8 MMOL/L (ref 3.4–5.3)
POTASSIUM SERPL-SCNC: 4 MMOL/L (ref 3.4–5.3)
PR INTERVAL - MUSE: 172 MS
PROT SERPL-MCNC: 6.3 G/DL (ref 6.4–8.3)
QRS DURATION - MUSE: 80 MS
QT - MUSE: 484 MS
QTC - MUSE: 487 MS
R AXIS - MUSE: 61 DEGREES
RBC # BLD AUTO: 4.13 10E6/UL (ref 3.8–5.2)
RBC URINE: 29 /HPF
SODIUM SERPL-SCNC: 140 MMOL/L (ref 135–145)
SP GR UR STRIP: 1.03 (ref 1–1.03)
SPECIMEN EXP DATE BLD: NORMAL
SQUAMOUS EPITHELIAL: <1 /HPF
SYSTOLIC BLOOD PRESSURE - MUSE: NORMAL MMHG
T AXIS - MUSE: 135 DEGREES
TRIGL SERPL-MCNC: 36 MG/DL
UFH PPP CHRO-ACNC: 0.29 IU/ML
UFH PPP CHRO-ACNC: 0.41 IU/ML
UROBILINOGEN UR STRIP-MCNC: <2 MG/DL
VENTRICULAR RATE- MUSE: 61 BPM
WBC # BLD AUTO: 7.5 10E3/UL (ref 4–11)
WBC URINE: 1 /HPF

## 2025-02-20 PROCEDURE — 84132 ASSAY OF SERUM POTASSIUM: CPT | Performed by: INTERNAL MEDICINE

## 2025-02-20 PROCEDURE — 85004 AUTOMATED DIFF WBC COUNT: CPT | Performed by: INTERNAL MEDICINE

## 2025-02-20 PROCEDURE — 85520 HEPARIN ASSAY: CPT | Performed by: EMERGENCY MEDICINE

## 2025-02-20 PROCEDURE — 250N000013 HC RX MED GY IP 250 OP 250 PS 637: Performed by: INTERNAL MEDICINE

## 2025-02-20 PROCEDURE — 99232 SBSQ HOSP IP/OBS MODERATE 35: CPT | Mod: FS | Performed by: INTERNAL MEDICINE

## 2025-02-20 PROCEDURE — C1769 GUIDE WIRE: HCPCS | Performed by: INTERNAL MEDICINE

## 2025-02-20 PROCEDURE — 250N000009 HC RX 250: Performed by: INTERNAL MEDICINE

## 2025-02-20 PROCEDURE — 250N000013 HC RX MED GY IP 250 OP 250 PS 637: Performed by: PHYSICIAN ASSISTANT

## 2025-02-20 PROCEDURE — B2111ZZ FLUOROSCOPY OF MULTIPLE CORONARY ARTERIES USING LOW OSMOLAR CONTRAST: ICD-10-PCS | Performed by: INTERNAL MEDICINE

## 2025-02-20 PROCEDURE — 99152 MOD SED SAME PHYS/QHP 5/>YRS: CPT | Performed by: INTERNAL MEDICINE

## 2025-02-20 PROCEDURE — C1887 CATHETER, GUIDING: HCPCS | Performed by: INTERNAL MEDICINE

## 2025-02-20 PROCEDURE — 255N000002 HC RX 255 OP 636: Performed by: INTERNAL MEDICINE

## 2025-02-20 PROCEDURE — 93458 L HRT ARTERY/VENTRICLE ANGIO: CPT | Performed by: INTERNAL MEDICINE

## 2025-02-20 PROCEDURE — C8929 TTE W OR WO FOL WCON,DOPPLER: HCPCS

## 2025-02-20 PROCEDURE — 93005 ELECTROCARDIOGRAM TRACING: CPT

## 2025-02-20 PROCEDURE — 84132 ASSAY OF SERUM POTASSIUM: CPT | Performed by: EMERGENCY MEDICINE

## 2025-02-20 PROCEDURE — 85520 HEPARIN ASSAY: CPT | Performed by: INTERNAL MEDICINE

## 2025-02-20 PROCEDURE — C1894 INTRO/SHEATH, NON-LASER: HCPCS | Performed by: INTERNAL MEDICINE

## 2025-02-20 PROCEDURE — 93306 TTE W/DOPPLER COMPLETE: CPT | Mod: 26 | Performed by: STUDENT IN AN ORGANIZED HEALTH CARE EDUCATION/TRAINING PROGRAM

## 2025-02-20 PROCEDURE — 258N000003 HC RX IP 258 OP 636: Performed by: INTERNAL MEDICINE

## 2025-02-20 PROCEDURE — 93458 L HRT ARTERY/VENTRICLE ANGIO: CPT | Mod: 26 | Performed by: INTERNAL MEDICINE

## 2025-02-20 PROCEDURE — 81001 URINALYSIS AUTO W/SCOPE: CPT | Performed by: INTERNAL MEDICINE

## 2025-02-20 PROCEDURE — B2151ZZ FLUOROSCOPY OF LEFT HEART USING LOW OSMOLAR CONTRAST: ICD-10-PCS | Performed by: INTERNAL MEDICINE

## 2025-02-20 PROCEDURE — 36415 COLL VENOUS BLD VENIPUNCTURE: CPT | Performed by: INTERNAL MEDICINE

## 2025-02-20 PROCEDURE — 36415 COLL VENOUS BLD VENIPUNCTURE: CPT | Performed by: EMERGENCY MEDICINE

## 2025-02-20 PROCEDURE — 272N000001 HC OR GENERAL SUPPLY STERILE: Performed by: INTERNAL MEDICINE

## 2025-02-20 PROCEDURE — 80061 LIPID PANEL: CPT | Performed by: INTERNAL MEDICINE

## 2025-02-20 PROCEDURE — 99207 PR NO BILLABLE SERVICE THIS VISIT: CPT | Performed by: HOSPITALIST

## 2025-02-20 PROCEDURE — 250N000013 HC RX MED GY IP 250 OP 250 PS 637: Performed by: NURSE PRACTITIONER

## 2025-02-20 PROCEDURE — 86900 BLOOD TYPING SEROLOGIC ABO: CPT | Performed by: INTERNAL MEDICINE

## 2025-02-20 PROCEDURE — 99207 PR APP CREDIT; MD BILLING SHARED VISIT: CPT | Performed by: PHYSICIAN ASSISTANT

## 2025-02-20 PROCEDURE — 210N000001 HC R&B IMCU HEART CARE

## 2025-02-20 PROCEDURE — 250N000011 HC RX IP 250 OP 636: Performed by: INTERNAL MEDICINE

## 2025-02-20 PROCEDURE — 80053 COMPREHEN METABOLIC PANEL: CPT | Performed by: EMERGENCY MEDICINE

## 2025-02-20 PROCEDURE — 93010 ELECTROCARDIOGRAM REPORT: CPT | Mod: RTG | Performed by: INTERNAL MEDICINE

## 2025-02-20 PROCEDURE — 99255 IP/OBS CONSLTJ NEW/EST HI 80: CPT | Performed by: INTERNAL MEDICINE

## 2025-02-20 PROCEDURE — 4A023N7 MEASUREMENT OF CARDIAC SAMPLING AND PRESSURE, LEFT HEART, PERCUTANEOUS APPROACH: ICD-10-PCS | Performed by: INTERNAL MEDICINE

## 2025-02-20 PROCEDURE — 99233 SBSQ HOSP IP/OBS HIGH 50: CPT | Performed by: INTERNAL MEDICINE

## 2025-02-20 RX ORDER — SODIUM CHLORIDE, SODIUM LACTATE, POTASSIUM CHLORIDE, CALCIUM CHLORIDE 600; 310; 30; 20 MG/100ML; MG/100ML; MG/100ML; MG/100ML
INJECTION, SOLUTION INTRAVENOUS CONTINUOUS
Status: DISCONTINUED | OUTPATIENT
Start: 2025-02-20 | End: 2025-02-20

## 2025-02-20 RX ORDER — SODIUM CHLORIDE 9 MG/ML
INJECTION, SOLUTION INTRAVENOUS CONTINUOUS
Status: DISCONTINUED | OUTPATIENT
Start: 2025-02-20 | End: 2025-02-20 | Stop reason: HOSPADM

## 2025-02-20 RX ORDER — MULTIVITAMIN
1 TABLET ORAL DAILY
COMMUNITY

## 2025-02-20 RX ORDER — FENTANYL CITRATE 50 UG/ML
25 INJECTION, SOLUTION INTRAMUSCULAR; INTRAVENOUS
Status: DISCONTINUED | OUTPATIENT
Start: 2025-02-20 | End: 2025-02-20

## 2025-02-20 RX ORDER — CALCIUM CARBONATE 500 MG/1
1000 TABLET, CHEWABLE ORAL 4 TIMES DAILY PRN
Status: DISCONTINUED | OUTPATIENT
Start: 2025-02-20 | End: 2025-02-22 | Stop reason: HOSPADM

## 2025-02-20 RX ORDER — SODIUM CHLORIDE 9 MG/ML
INJECTION, SOLUTION INTRAVENOUS CONTINUOUS
Status: CANCELLED | OUTPATIENT
Start: 2025-02-20

## 2025-02-20 RX ORDER — ONDANSETRON 4 MG/1
4 TABLET, ORALLY DISINTEGRATING ORAL EVERY 6 HOURS PRN
Status: DISCONTINUED | OUTPATIENT
Start: 2025-02-20 | End: 2025-02-22 | Stop reason: HOSPADM

## 2025-02-20 RX ORDER — LIDOCAINE 40 MG/G
CREAM TOPICAL
Status: DISCONTINUED | OUTPATIENT
Start: 2025-02-20 | End: 2025-02-22 | Stop reason: HOSPADM

## 2025-02-20 RX ORDER — PROCHLORPERAZINE MALEATE 10 MG
10 TABLET ORAL EVERY 6 HOURS PRN
Status: CANCELLED | OUTPATIENT
Start: 2025-02-20

## 2025-02-20 RX ORDER — NITROGLYCERIN 0.4 MG/1
0.4 TABLET SUBLINGUAL EVERY 5 MIN PRN
Status: DISCONTINUED | OUTPATIENT
Start: 2025-02-20 | End: 2025-02-22 | Stop reason: HOSPADM

## 2025-02-20 RX ORDER — SODIUM CHLORIDE AND POTASSIUM CHLORIDE 150; 900 MG/100ML; MG/100ML
INJECTION, SOLUTION INTRAVENOUS CONTINUOUS
Status: DISCONTINUED | OUTPATIENT
Start: 2025-02-20 | End: 2025-02-20

## 2025-02-20 RX ORDER — CALCIUM CARBONATE 500 MG/1
1000 TABLET, CHEWABLE ORAL 4 TIMES DAILY PRN
Status: CANCELLED | OUTPATIENT
Start: 2025-02-20

## 2025-02-20 RX ORDER — MORPHINE SULFATE 2 MG/ML
2 INJECTION, SOLUTION INTRAMUSCULAR; INTRAVENOUS
Status: DISCONTINUED | OUTPATIENT
Start: 2025-02-20 | End: 2025-02-22 | Stop reason: HOSPADM

## 2025-02-20 RX ORDER — AMOXICILLIN 250 MG
2 CAPSULE ORAL 2 TIMES DAILY PRN
Status: DISCONTINUED | OUTPATIENT
Start: 2025-02-20 | End: 2025-02-22 | Stop reason: HOSPADM

## 2025-02-20 RX ORDER — MORPHINE SULFATE 2 MG/ML
2 INJECTION, SOLUTION INTRAMUSCULAR; INTRAVENOUS
Status: DISCONTINUED | OUTPATIENT
Start: 2025-02-20 | End: 2025-02-20 | Stop reason: HOSPADM

## 2025-02-20 RX ORDER — IODIXANOL 320 MG/ML
INJECTION, SOLUTION INTRAVASCULAR
Status: DISCONTINUED | OUTPATIENT
Start: 2025-02-20 | End: 2025-02-20 | Stop reason: HOSPADM

## 2025-02-20 RX ORDER — SODIUM CHLORIDE 9 MG/ML
INJECTION, SOLUTION INTRAVENOUS CONTINUOUS
Status: DISCONTINUED | OUTPATIENT
Start: 2025-02-20 | End: 2025-02-20

## 2025-02-20 RX ORDER — LIDOCAINE 40 MG/G
CREAM TOPICAL
Status: DISCONTINUED | OUTPATIENT
Start: 2025-02-20 | End: 2025-02-20 | Stop reason: HOSPADM

## 2025-02-20 RX ORDER — ROSUVASTATIN CALCIUM 5 MG/1
20 TABLET, COATED ORAL DAILY
Status: DISCONTINUED | OUTPATIENT
Start: 2025-02-20 | End: 2025-02-20 | Stop reason: HOSPADM

## 2025-02-20 RX ORDER — ACETAMINOPHEN 650 MG/1
650 SUPPOSITORY RECTAL EVERY 4 HOURS PRN
Status: CANCELLED | OUTPATIENT
Start: 2025-02-20

## 2025-02-20 RX ORDER — ASPIRIN 325 MG
325 TABLET ORAL ONCE
Status: COMPLETED | OUTPATIENT
Start: 2025-02-20 | End: 2025-02-20

## 2025-02-20 RX ORDER — NITROGLYCERIN 0.4 MG/1
0.4 TABLET SUBLINGUAL EVERY 5 MIN PRN
Status: CANCELLED | OUTPATIENT
Start: 2025-02-20

## 2025-02-20 RX ORDER — ASPIRIN 81 MG/1
162 TABLET, CHEWABLE ORAL DAILY
Status: CANCELLED | OUTPATIENT
Start: 2025-02-21

## 2025-02-20 RX ORDER — METOPROLOL SUCCINATE 25 MG/1
25 TABLET, EXTENDED RELEASE ORAL DAILY
Status: DISCONTINUED | OUTPATIENT
Start: 2025-02-21 | End: 2025-02-22 | Stop reason: HOSPADM

## 2025-02-20 RX ORDER — NALOXONE HYDROCHLORIDE 0.4 MG/ML
0.2 INJECTION, SOLUTION INTRAMUSCULAR; INTRAVENOUS; SUBCUTANEOUS
Status: ACTIVE | OUTPATIENT
Start: 2025-02-20 | End: 2025-02-21

## 2025-02-20 RX ORDER — PROCHLORPERAZINE MALEATE 10 MG
10 TABLET ORAL EVERY 6 HOURS PRN
Status: DISCONTINUED | OUTPATIENT
Start: 2025-02-20 | End: 2025-02-22 | Stop reason: HOSPADM

## 2025-02-20 RX ORDER — HYDROMORPHONE HCL IN WATER/PF 6 MG/30 ML
0.2 PATIENT CONTROLLED ANALGESIA SYRINGE INTRAVENOUS EVERY 4 HOURS PRN
Status: DISCONTINUED | OUTPATIENT
Start: 2025-02-20 | End: 2025-02-22 | Stop reason: HOSPADM

## 2025-02-20 RX ORDER — FLUMAZENIL 0.1 MG/ML
0.2 INJECTION, SOLUTION INTRAVENOUS
Status: ACTIVE | OUTPATIENT
Start: 2025-02-20 | End: 2025-02-21

## 2025-02-20 RX ORDER — ROSUVASTATIN CALCIUM 10 MG/1
20 TABLET, COATED ORAL DAILY
Status: DISCONTINUED | OUTPATIENT
Start: 2025-02-21 | End: 2025-02-22 | Stop reason: HOSPADM

## 2025-02-20 RX ORDER — HYDROMORPHONE HCL IN WATER/PF 6 MG/30 ML
0.2 PATIENT CONTROLLED ANALGESIA SYRINGE INTRAVENOUS EVERY 4 HOURS PRN
Status: CANCELLED | OUTPATIENT
Start: 2025-02-20

## 2025-02-20 RX ORDER — AMOXICILLIN 250 MG
2 CAPSULE ORAL 2 TIMES DAILY PRN
Status: CANCELLED | OUTPATIENT
Start: 2025-02-20

## 2025-02-20 RX ORDER — METOPROLOL SUCCINATE 25 MG/1
25 TABLET, EXTENDED RELEASE ORAL DAILY
Status: CANCELLED | OUTPATIENT
Start: 2025-02-20

## 2025-02-20 RX ORDER — ASPIRIN 81 MG/1
243 TABLET, CHEWABLE ORAL ONCE
Status: CANCELLED | OUTPATIENT
Start: 2025-02-20

## 2025-02-20 RX ORDER — ASPIRIN 81 MG/1
162 TABLET, CHEWABLE ORAL DAILY
Status: DISCONTINUED | OUTPATIENT
Start: 2025-02-21 | End: 2025-02-22 | Stop reason: HOSPADM

## 2025-02-20 RX ORDER — ACETAMINOPHEN 650 MG/1
650 SUPPOSITORY RECTAL EVERY 4 HOURS PRN
Status: DISCONTINUED | OUTPATIENT
Start: 2025-02-20 | End: 2025-02-22 | Stop reason: HOSPADM

## 2025-02-20 RX ORDER — AMOXICILLIN 250 MG
1 CAPSULE ORAL 2 TIMES DAILY PRN
Status: CANCELLED | OUTPATIENT
Start: 2025-02-20

## 2025-02-20 RX ORDER — FAMOTIDINE 20 MG/1
20 TABLET, FILM COATED ORAL 2 TIMES DAILY
Status: DISCONTINUED | OUTPATIENT
Start: 2025-02-20 | End: 2025-02-22 | Stop reason: HOSPADM

## 2025-02-20 RX ORDER — ONDANSETRON 2 MG/ML
4 INJECTION INTRAMUSCULAR; INTRAVENOUS EVERY 6 HOURS PRN
Status: DISCONTINUED | OUTPATIENT
Start: 2025-02-20 | End: 2025-02-20 | Stop reason: HOSPADM

## 2025-02-20 RX ORDER — ONDANSETRON 4 MG/1
4 TABLET, ORALLY DISINTEGRATING ORAL EVERY 6 HOURS PRN
Status: DISCONTINUED | OUTPATIENT
Start: 2025-02-20 | End: 2025-02-20 | Stop reason: HOSPADM

## 2025-02-20 RX ORDER — MORPHINE SULFATE 2 MG/ML
2 INJECTION, SOLUTION INTRAMUSCULAR; INTRAVENOUS ONCE
Status: DISCONTINUED | OUTPATIENT
Start: 2025-02-20 | End: 2025-02-20

## 2025-02-20 RX ORDER — NALOXONE HYDROCHLORIDE 0.4 MG/ML
0.4 INJECTION, SOLUTION INTRAMUSCULAR; INTRAVENOUS; SUBCUTANEOUS
Status: ACTIVE | OUTPATIENT
Start: 2025-02-20 | End: 2025-02-21

## 2025-02-20 RX ORDER — AMOXICILLIN 250 MG
1 CAPSULE ORAL 2 TIMES DAILY PRN
Status: DISCONTINUED | OUTPATIENT
Start: 2025-02-20 | End: 2025-02-22 | Stop reason: HOSPADM

## 2025-02-20 RX ORDER — HEPARIN SODIUM 10000 [USP'U]/100ML
0-5000 INJECTION, SOLUTION INTRAVENOUS CONTINUOUS
Status: CANCELLED | OUTPATIENT
Start: 2025-02-20

## 2025-02-20 RX ORDER — ONDANSETRON 4 MG/1
4 TABLET, ORALLY DISINTEGRATING ORAL EVERY 6 HOURS PRN
Status: CANCELLED | OUTPATIENT
Start: 2025-02-20

## 2025-02-20 RX ORDER — LIDOCAINE 40 MG/G
CREAM TOPICAL
Status: CANCELLED | OUTPATIENT
Start: 2025-02-20

## 2025-02-20 RX ORDER — CALCIUM CARBONATE 500 MG/1
1000 TABLET, CHEWABLE ORAL 4 TIMES DAILY PRN
Status: DISCONTINUED | OUTPATIENT
Start: 2025-02-20 | End: 2025-02-20 | Stop reason: HOSPADM

## 2025-02-20 RX ORDER — ACETAMINOPHEN 650 MG/1
650 SUPPOSITORY RECTAL EVERY 4 HOURS PRN
Status: DISCONTINUED | OUTPATIENT
Start: 2025-02-20 | End: 2025-02-20 | Stop reason: HOSPADM

## 2025-02-20 RX ORDER — ACETAMINOPHEN 325 MG/1
650 TABLET ORAL EVERY 4 HOURS PRN
Status: DISCONTINUED | OUTPATIENT
Start: 2025-02-20 | End: 2025-02-22 | Stop reason: HOSPADM

## 2025-02-20 RX ORDER — ACETAMINOPHEN 325 MG/1
650 TABLET ORAL EVERY 4 HOURS PRN
Status: DISCONTINUED | OUTPATIENT
Start: 2025-02-20 | End: 2025-02-20 | Stop reason: HOSPADM

## 2025-02-20 RX ORDER — ASPIRIN 81 MG/1
243 TABLET, CHEWABLE ORAL ONCE
Status: COMPLETED | OUTPATIENT
Start: 2025-02-20 | End: 2025-02-20

## 2025-02-20 RX ORDER — MORPHINE SULFATE 2 MG/ML
2 INJECTION, SOLUTION INTRAMUSCULAR; INTRAVENOUS
Status: CANCELLED | OUTPATIENT
Start: 2025-02-20

## 2025-02-20 RX ORDER — METOPROLOL SUCCINATE 25 MG/1
25 TABLET, EXTENDED RELEASE ORAL DAILY
Status: DISCONTINUED | OUTPATIENT
Start: 2025-02-20 | End: 2025-02-20 | Stop reason: HOSPADM

## 2025-02-20 RX ORDER — HYDROMORPHONE HCL IN WATER/PF 6 MG/30 ML
0.2 PATIENT CONTROLLED ANALGESIA SYRINGE INTRAVENOUS EVERY 4 HOURS PRN
Status: DISCONTINUED | OUTPATIENT
Start: 2025-02-20 | End: 2025-02-20 | Stop reason: HOSPADM

## 2025-02-20 RX ORDER — HYDRALAZINE HYDROCHLORIDE 20 MG/ML
10 INJECTION INTRAMUSCULAR; INTRAVENOUS
Status: ACTIVE | OUTPATIENT
Start: 2025-02-20 | End: 2025-02-20

## 2025-02-20 RX ORDER — AMOXICILLIN 250 MG
1 CAPSULE ORAL 2 TIMES DAILY PRN
Status: DISCONTINUED | OUTPATIENT
Start: 2025-02-20 | End: 2025-02-20 | Stop reason: HOSPADM

## 2025-02-20 RX ORDER — ASPIRIN 81 MG/1
162 TABLET, CHEWABLE ORAL DAILY
Status: DISCONTINUED | OUTPATIENT
Start: 2025-02-20 | End: 2025-02-20 | Stop reason: HOSPADM

## 2025-02-20 RX ORDER — HYDROCODONE BITARTRATE AND ACETAMINOPHEN 5; 325 MG/1; MG/1
1 TABLET ORAL EVERY 4 HOURS PRN
Status: CANCELLED | OUTPATIENT
Start: 2025-02-20

## 2025-02-20 RX ORDER — ONDANSETRON 2 MG/ML
4 INJECTION INTRAMUSCULAR; INTRAVENOUS EVERY 6 HOURS PRN
Status: CANCELLED | OUTPATIENT
Start: 2025-02-20

## 2025-02-20 RX ORDER — ONDANSETRON 2 MG/ML
4 INJECTION INTRAMUSCULAR; INTRAVENOUS EVERY 6 HOURS PRN
Status: DISCONTINUED | OUTPATIENT
Start: 2025-02-20 | End: 2025-02-22 | Stop reason: HOSPADM

## 2025-02-20 RX ORDER — SODIUM CHLORIDE 9 MG/ML
INJECTION, SOLUTION INTRAVENOUS ONCE
Status: COMPLETED | OUTPATIENT
Start: 2025-02-20 | End: 2025-02-20

## 2025-02-20 RX ORDER — HYDROCODONE BITARTRATE AND ACETAMINOPHEN 5; 325 MG/1; MG/1
1 TABLET ORAL EVERY 4 HOURS PRN
Status: DISCONTINUED | OUTPATIENT
Start: 2025-02-20 | End: 2025-02-22 | Stop reason: HOSPADM

## 2025-02-20 RX ORDER — AMOXICILLIN 250 MG
2 CAPSULE ORAL 2 TIMES DAILY PRN
Status: DISCONTINUED | OUTPATIENT
Start: 2025-02-20 | End: 2025-02-20 | Stop reason: HOSPADM

## 2025-02-20 RX ORDER — FENTANYL CITRATE 50 UG/ML
INJECTION, SOLUTION INTRAMUSCULAR; INTRAVENOUS
Status: DISCONTINUED | OUTPATIENT
Start: 2025-02-20 | End: 2025-02-20 | Stop reason: HOSPADM

## 2025-02-20 RX ORDER — ASPIRIN 325 MG
325 TABLET ORAL ONCE
Status: CANCELLED | OUTPATIENT
Start: 2025-02-20 | End: 2025-02-20

## 2025-02-20 RX ORDER — HEPARIN SODIUM 10000 [USP'U]/100ML
0-5000 INJECTION, SOLUTION INTRAVENOUS CONTINUOUS
Status: DISCONTINUED | OUTPATIENT
Start: 2025-02-20 | End: 2025-02-20

## 2025-02-20 RX ORDER — ACETAMINOPHEN 325 MG/1
650 TABLET ORAL EVERY 4 HOURS PRN
Status: CANCELLED | OUTPATIENT
Start: 2025-02-20

## 2025-02-20 RX ORDER — DIAZEPAM 10 MG/1
10 TABLET ORAL ONCE
Status: DISCONTINUED | OUTPATIENT
Start: 2025-02-20 | End: 2025-02-20

## 2025-02-20 RX ORDER — ROSUVASTATIN CALCIUM 5 MG/1
20 TABLET, COATED ORAL DAILY
Status: CANCELLED | OUTPATIENT
Start: 2025-02-21

## 2025-02-20 RX ORDER — HYDROCODONE BITARTRATE AND ACETAMINOPHEN 5; 325 MG/1; MG/1
1 TABLET ORAL EVERY 4 HOURS PRN
Status: DISCONTINUED | OUTPATIENT
Start: 2025-02-20 | End: 2025-02-20 | Stop reason: HOSPADM

## 2025-02-20 RX ORDER — PROCHLORPERAZINE MALEATE 10 MG
10 TABLET ORAL EVERY 6 HOURS PRN
Status: DISCONTINUED | OUTPATIENT
Start: 2025-02-20 | End: 2025-02-20 | Stop reason: HOSPADM

## 2025-02-20 RX ORDER — ATROPINE SULFATE 0.1 MG/ML
0.5 INJECTION INTRAVENOUS
Status: ACTIVE | OUTPATIENT
Start: 2025-02-20 | End: 2025-02-21

## 2025-02-20 RX ADMIN — ASPIRIN 81 MG CHEWABLE TABLET 162 MG: 81 TABLET CHEWABLE at 09:18

## 2025-02-20 RX ADMIN — DIAZEPAM 10 MG: 10 TABLET ORAL at 14:48

## 2025-02-20 RX ADMIN — PERFLUTREN 2 ML: 6.52 INJECTION, SUSPENSION INTRAVENOUS at 07:46

## 2025-02-20 RX ADMIN — METOPROLOL SUCCINATE 25 MG: 25 TABLET, EXTENDED RELEASE ORAL at 11:23

## 2025-02-20 RX ADMIN — NITROGLYCERIN 0.4 MG: 0.4 TABLET SUBLINGUAL at 09:35

## 2025-02-20 RX ADMIN — ROSUVASTATIN 20 MG: 5 TABLET, FILM COATED ORAL at 11:21

## 2025-02-20 RX ADMIN — NITROGLYCERIN 0.4 MG: 0.4 TABLET SUBLINGUAL at 09:27

## 2025-02-20 RX ADMIN — SODIUM CHLORIDE: 0.9 INJECTION, SOLUTION INTRAVENOUS at 02:51

## 2025-02-20 RX ADMIN — FAMOTIDINE 20 MG: 20 TABLET, FILM COATED ORAL at 23:48

## 2025-02-20 RX ADMIN — SODIUM CHLORIDE: 0.9 INJECTION, SOLUTION INTRAVENOUS at 11:21

## 2025-02-20 RX ADMIN — ASPIRIN 81 MG CHEWABLE TABLET 162 MG: 81 TABLET CHEWABLE at 13:47

## 2025-02-20 ASSESSMENT — ACTIVITIES OF DAILY LIVING (ADL)
DOING_ERRANDS_INDEPENDENTLY_DIFFICULTY: NO
ADLS_ACUITY_SCORE: 46
ADLS_ACUITY_SCORE: 41
ADLS_ACUITY_SCORE: 23
ADLS_ACUITY_SCORE: 41
ADLS_ACUITY_SCORE: 41
ADLS_ACUITY_SCORE: 46
ADLS_ACUITY_SCORE: 23
ADLS_ACUITY_SCORE: 46
ADLS_ACUITY_SCORE: 46
ADLS_ACUITY_SCORE: 41
ADLS_ACUITY_SCORE: 41
ADLS_ACUITY_SCORE: 23
ADLS_ACUITY_SCORE: 23
ADLS_ACUITY_SCORE: 41
ADLS_ACUITY_SCORE: 46
ADLS_ACUITY_SCORE: 41
ADLS_ACUITY_SCORE: 41
ADLS_ACUITY_SCORE: 23
ADLS_ACUITY_SCORE: 24
ADLS_ACUITY_SCORE: 46
ADLS_ACUITY_SCORE: 41

## 2025-02-20 ASSESSMENT — EJECTION FRACTION: EF_VALUE: .23

## 2025-02-20 NOTE — PRE-PROCEDURE
GENERAL PRE-PROCEDURE:   Procedure:  Coronary angiogram with possible PCI  Date/Time:  2/20/2025 1:49 PM    Written consent obtained?: Yes    Risks and benefits: Risks, benefits and alternatives were discussed    Consent given by:  Patient  Patient states understanding of procedure being performed: Yes    Patient's understanding of procedure matches consent: Yes    Procedure consent matches procedure scheduled: Yes    Expected level of sedation:  Moderate  Appropriately NPO:  Yes  ASA Class:  4 (NSTEMI, HTN, cardiomyopathy)  Mallampati  :  Grade 2- soft palate, base of uvula, tonsillar pillars, and portion of posterior pharyngeal wall visible  Lungs:  Lungs clear with good breath sounds bilaterally  Heart:  Normal heart sounds and rate  History & Physical reviewed:  History and physical reviewed and updates made (see comment)  H&P Comments:  Clinically Significant Risk Factors Present on Admission    Cardiovascular : NSTEMI, HTN, cardiomyopathy    Fluid & Electrolyte Disorders : Not present on admission    Gastroenterology : Not present on admission    Hematology/Oncology : Not present on admission    Nephrology : Not present on admission    Neurology : Not present on admission    Pulmonology : Not present on admission    Systemic : Not present on admission    Statement of review:  I have reviewed the lab findings, diagnostic data, medications, and the plan for sedation

## 2025-02-20 NOTE — PHARMACY-ADMISSION MEDICATION HISTORY
Admission medication history completed at [facility]. Please see Pharmacist Admission Medication History note from 02/20/2025.

## 2025-02-20 NOTE — PROGRESS NOTES
[facility]    Hospitalist Progress Note    Assessment & Plan    54 year old female with hypertension who presented for chest pain on 2/19/25 to [facility] ER.   Was at work ([employer]) when she developed sudden nausea and vomiting. This was followed by severe chest pain and pressure. Non-radiating. Unable to exert herself due to severity of pain. Found to have elevated troponin at 108.  Started on heparin.   Repeat Troponin 2.5 hours later was 241.   Had Chest pain this AM releaved with one NTG.    Transferred to [facility] for Coronary Angiogram.     1.  Large-caliber coronary arteries in a codominant system.    2.  30% proximal to mid LAD narrowing.  Remainder of the LAD appears normal.  3.  Large codominant left circumflex appears normal.  4.  Small caliber RCA supplies PDA only.  No stenoses.  5.  Akinesia of large segment of mid anterior wall and mid inferior wall with preserved apical function and hyperdynamic basilar function of anterior and inferior segments.  EF 30% by visual estimate.  Pattern consistent with stress cardiomyopathy.  6.  LVEDP = 22 mmHg    Impression:   Principal Problem:    Takotsubo syndrome -- is under stress related to work   -- care manager to assist        Essential hypertension -- was on Lotensin, switched to Metoprolol       Prediabetes -- Hgb A1C 5.8 on 8/28/23      Weight loss, abnormal -- 35 lbs in 10 months    -- will evaluate her reported weight loss, further workup if has lost that much weight      Hypokalemia -- replaced       Nausea and vomiting -- better      Low LVEF at 30-35% on Echo 2/19/25      Epigastric pain -- ?etiology, will try Pepcid 20 mg bid for possible stress gastritis, and further workup if persists.       Plan:  as above    DVT Prophylaxis: Low Risk/Ambulatory with no VTE prophylaxis indicated  Code Status: Full Code    Disposition: Expected discharge home tomorrow  Medically Ready for Discharge: Anticipated Tomorrow      Other  Diagnoses  Clinically Significant Risk Factors Present on Admission        # Hypokalemia: Lowest K = 3.1 mmol/L in last 2 days, will replace as needed            # Hypertension: Noted on problem list    # Chronic heart failure with reduced ejection fraction: last echo with EF <40%                       Hilario Borges MD  Pager [phone]  Cell Phone [phone]  Text Page (7am to 6pm)    Interval History   Currently feels OK, no chest pain.  She reports her wt was 165 April 2024, and now 130 and not able to put weight on and decreased appetite and mild epigastric pain for the past month but no heartburn.  Pain not affected by eating.     Physical Exam   Temp: 97.5  F (36.4  C) Temp src: Oral BP: 139/75 Pulse: 57   Resp: 18 SpO2: 100 % O2 Device: Nasal cannula Oxygen Delivery: 2 LPM  Vitals:    02/20/25 1324   Weight: 59 kg (130 lb)     Vital Signs with Ranges  Temp:  [97.5  F (36.4  C)-98.5  F (36.9  C)] 97.5  F (36.4  C)  Pulse:  [53-86] 57  Resp:  [9-22] 18  BP: (114-154)/(70-88) 139/75  SpO2:  [99 %-100 %] 100 %  No intake/output data recorded.    # Pain Assessment:      2/20/2025     5:04 PM   Current Pain Score   Patient currently in pain? bruce johnson pain level was assessed and she currently denies pain.        Constitutional: Awake, alert, cooperative, no apparent distress  Respiratory: Clear to auscultation bilaterally, no crackles or wheezing  Cardiovascular: Regular rate and rhythm, normal S1 and S2, and no murmur noted  GI: Normal bowel sounds, soft, non-distended, mild epigastric tenderness   Extrem: No calf tenderness, no ankle edema  Neuro: Ox3, no focal motor or sensory deficits    Medications   Current Facility-Administered Medications   Medication Dose Route Frequency Provider Last Rate Last Admin     Current Facility-Administered Medications   Medication Dose Route Frequency Provider Last Rate Last Admin    [START ON 2/21/2025] aspirin (ASA) chewable tablet 162 mg  162 mg Oral  Daily Asia Newby CNP        famotidine (PEPCID) tablet 20 mg  20 mg Oral BID Hilario Jiang MD        [START ON 2025] metoprolol succinate ER (TOPROL XL) 24 hr tablet 25 mg  25 mg Oral Daily Asia Newby CNP        [START ON 2025] rosuvastatin (CRESTOR) tablet 20 mg  20 mg Oral Daily Asia Newby CNP        sodium chloride (PF) 0.9% PF flush 3 mL  3 mL Intracatheter Q8H Asia Newby CNP   3 mL at 25 1836       Data   Recent Labs   Lab 25  0616 25  0305 25  2318 25  2046   WBC 7.5  --  11.6* 9.0   HGB 11.4*  --  12.2 12.6   MCV 83  --  83 83     --  272 297     --   --  139   POTASSIUM 4.0 3.8  --  3.1*   CHLORIDE 103  --   --  99   CO2 27  --   --  28   BUN 8.0  --   --  9.7   CR 0.65  --   --  0.74   ANIONGAP 10  --   --  12   GENESIS 9.0  --   --  9.0   GLC 96  --   --  133*   ALBUMIN 3.9  --   --  4.6   PROTTOTAL 6.3*  --   --  7.6   BILITOTAL 0.5  --   --  0.4   ALKPHOS 88  --   --  119   ALT 25  --   --  31   AST 34  --   --  37   LIPASE  --   --   --  90*       Imaging:   Recent Results (from the past 24 hours)   Echocardiogram Complete   Result Value    LVEF  30-35% (moderately reduced)    [facility]    [id number]  [id number]  [id number]  [id number][address]ZA[address] [address]     Name: KRISTEN VEGA  MRN: [id number]  : 1970  Study Date: 2025 07:06 AM  Age: 54 yrs  Gender: Female  Patient Location: [facility]  Reason For Study: Chest Pain  Ordering Physician: LIZ BERTRAND  Performed By: AD     BSA: 1.7 m2  Height: 66 in  Weight: 130 lb  HR: 69  ______________________________________________________________________________  Procedure  Echocardiogram with two-dimensional, color and spectral Doppler. Definity (NDC  #[id number]) given intravenously. Adequate quality two-dimensional was  performed and interpreted. There is no comparison study  available.  ______________________________________________________________________________  Interpretation Summary     The left ventricle is normal in size. There is mild concentric left  ventricular hypertrophy.  Left ventricular function is decreased. The ejection fraction is 30-35%  (moderately reduced). There is severe hypokinesis of the mid to apical  segments with relatively preserved function of the basal segments. Consider a  diagnosis of Takotsubo Cardiomyopathy.     The right ventricle is normal in size and function.  Normal left atrial size. Right atrial size is normal.  IVC diameter <2.1 cm collapsing >50% with sniff suggests a normal RA pressure  of 3 mmHg.  There is no comparison study available.  ______________________________________________________________________________  Left Ventricle  The left ventricle is normal in size. Left ventricular function is decreased.  The ejection fraction is 30-35% (moderately reduced). There is mild concentric  left ventricular hypertrophy. Left ventricular diastolic function is  indeterminate. There is severe hypokinesis of the mid to apical segments with  relatively preserved function of the basal segments. Consider a diagnosis of  Takotsubo Cardiomyopathy.     Right Ventricle  The right ventricle is normal in size and function.     Atria  Normal left atrial size. Right atrial size is normal.     Mitral Valve  Mitral valve leaflets appear normal. There is trace mitral regurgitation.  There is no mitral valve stenosis.     Tricuspid Valve  Tricuspid valve leaflets appear normal. There is trace tricuspid  regurgitation. Right ventricular systolic pressure could not be approximated  due to inadequate tricuspid regurgitation.     Aortic Valve  The aortic valve is trileaflet. Aortic valve leaflets appear normal. No aortic  regurgitation is present. No aortic stenosis is present.     Pulmonic Valve  The pulmonic valve is not well visualized. There is trace pulmonic  valvular  regurgitation.     Vessels  The aorta root is normal. IVC diameter <2.1 cm collapsing >50% with sniff  suggests a normal RA pressure of 3 mmHg.     Pericardium  There is no pericardial effusion.     Rhythm  Sinus rhythm was noted.  ______________________________________________________________________________  MMode/2D Measurements & Calculations  IVSd: 1.2 cm  LVIDd: 4.2 cm  LVIDs: 3.0 cm  LVPWd: 1.2 cm  FS: 29.5 %  LV mass(C)d: 169.6 grams  LV mass(C)dI: 101.8 grams/m2  Ao root diam: 2.8 cm  LA dimension: 3.1 cm  asc Aorta Diam: 2.9 cm  LA/Ao: 1.1  LVOT diam: 1.9 cm  LVOT area: 2.8 cm2  Ao root diam index Ht(cm/m): 1.7  Ao root diam index BSA (cm/m2): 1.7  Asc Ao diam index BSA (cm/m2): 1.7  Asc Ao diam index Ht(cm/m): 1.7  EF Biplane: 39.0 %     LA Volume Indexed (AL/bp): 22.1 ml/m2  RV Base: 3.0 cm  RWT: 0.55  TAPSE: 2.6 cm     Time Measurements  MM HR: 67.0 BPM     Doppler Measurements & Calculations  MV E max stiven: 110.0 cm/sec  MV A max stiven: 47.6 cm/sec  MV E/A: 2.3  MV max PG: 3.4 mmHg  MV mean P.0 mmHg  MV V2 VTI: 29.1 cm  MVA(VTI): 2.0 cm2  MV dec slope: 538.0 cm/sec2  MV dec time: 0.21 sec  Ao V2 max: 125.0 cm/sec  Ao max P.0 mmHg  Ao V2 mean: 86.7 cm/sec  Ao mean PG: 3.0 mmHg  Ao V2 VTI: 25.9 cm  ONEYDA(I,D): 2.3 cm2  ONEYDA(V,D): 2.2 cm2     LV V1 max PG: 3.7 mmHg  LV V1 max: 96.4 cm/sec  LV V1 VTI: 20.6 cm  SV(LVOT): 58.4 ml  SI(LVOT): 35.1 ml/m2  PA acc time: 0.20 sec  AV Stiven Ratio (DI): 0.77  ONEYDA Index (cm2/m2): 1.4  E/E': 14.1  E/E' av.5  Lateral E/e': 13.0  Medial E/e': 14.0  Peak E' Stiven: 7.8 cm/sec  RV S Stiven: 11.1 cm/sec     ______________________________________________________________________________  Report approved by: Alexander Bullock MD on 2025 08:58 AM         Cardiac Catheterization    Narrative      Prox LAD to Mid LAD lesion is 30% stenosed.    1.  Large-caliber coronary arteries in a codominant system.    2.  30% proximal to mid LAD narrowing.  Remainder of the LAD  appears   normal.  3.  Large codominant left circumflex appears normal.  4.  Small caliber RCA supplies PDA only.  No stenoses.  5.  Akinesia of large segment of mid anterior wall and mid inferior wall   with preserved apical function and hyperdynamic basilar function of   anterior and inferior segments.  EF 30% by visual estimate.  Pattern   consistent with stress cardiomyopathy.  6.  LVEDP = 22 mmHg

## 2025-02-20 NOTE — DISCHARGE SUMMARY
[facility]  Hospitalist Discharge Summary      Date of Admission:  2/19/2025  Date of Discharge:  2/20/2025  Discharging Provider: Evan Kc MD  Discharge Service: Hospitalist Service    Discharge Diagnoses   NSTEMI  Takotsubo's cardiomyopathy  Elevated blood pressure reading    Clinically Significant Risk Factors          Follow-ups Needed After Discharge   To be determined    Unresulted Labs Ordered in the Past 30 Days of this Admission       No orders found for last 31 day(s).        These results will be followed up by     Discharge Disposition   Transferred to [facility] for cardiac cath  Condition at discharge: Stable    Hospital Course   Identification: Bharath Forbes is a 54 year old female  and grandmother  PMHx: Prediabetes, hypertension     February 19: While working, she developed an episode of chest pressure with emesis x 5 and defecation.  She took Advil for her symptoms without much improvement.  She presented to the emergency department where she was hypertensive (140/88).  Workup showed EKG with T wave abnormality.  She had hypokalemia (3.1) that was replaced.  She had mild lipase elevation (90) likely due to recent vomiting.  Initial troponin was elevated (108, then 241 on repeat) cardiology was consulted.  Full dose aspirin was given.  Echocardiogram showed left ventricular hypertrophy with reduced ejection fraction 30-35% and severe hypokinesis of apical segment possibly suggestive of Takotsubo cardiomyopathy.  On discussion she did report increased social stressors with her children, grandchildren, bills, and worklife balance.  Cardiology was consulted and recommended left heart cath.  Prior to transfer, patient was signed out to hospitalist at [facility].    Consultations This Hospital Stay   PHARMACY IP CONSULT  PHARMACY IP CONSULT  CARDIOLOGY IP CONSULT  PHARMACY IP CONSULT    Code Status   Full Code    Time Spent on this Encounter   Evan HINOJOSA  MD Yamini, personally saw the patient today and spent greater than 30 minutes discharging this patient.       Evan Kc MD  [facility] EMERGENCY ROOM  [address] [address]  Phone: [phone]  Fax: [phone]  ______________________________________________________________________    Physical Exam   Vital Signs: Temp: 98.3  F (36.8  C) Temp src: Oral BP: (!) 149/75 Pulse: 80   Resp: 18 SpO2: 99 % O2 Device: None (Room air)    Weight: 130 lbs 0 oz  Thin, black female lying in the bed no acute distress  Awake, alert, appropriate in conversation  Makes eye contact during conversation  I was able to get her to laugh once  Gap between incisors, otherwise appears to have intact dentition  Breathing comfortably on room air  Regular rate and rhythm, no murmur  Lungs clear to auscultation bilaterally  Nonacute abdomen  Minimal peripheral edema         Primary Care Physician   Lauren Morgan    Discharge Orders   No discharge procedures on file.    Significant Results and Procedures   Most Recent 3 CBC's:  Recent Labs   Lab Test 02/20/25  0616 02/19/25 2318 02/19/25 2046   WBC 7.5 11.6* 9.0   HGB 11.4* 12.2 12.6   MCV 83 83 83    272 297     Most Recent 3 BMP's:  Recent Labs   Lab Test 02/20/25  0616 02/20/25  0305 02/19/25 2046 11/02/24  1555     --  139 140   POTASSIUM 4.0 3.8 3.1* 3.6   CHLORIDE 103  --  99 96*   CO2 27  --  28 28   BUN 8.0  --  9.7 15.5   CR 0.65  --  0.74 1.01*   ANIONGAP 10  --  12 16*   GENESIS 9.0  --  9.0 10.4   GLC 96  --  133* 94     Most Recent 2 LFT's:  Recent Labs   Lab Test 02/20/25  0616 02/19/25 2046   AST 34 37   ALT 25 31   ALKPHOS 88 119   BILITOTAL 0.5 0.4   ,   Results for orders placed or performed during the hospital encounter of 02/19/25   CT Chest Pulmonary Embolism w Contrast     Value    Radiologist flags Incidental right upper lobe nodules    Narrative    EXAM: CT ABDOMEN PELVIS W CONTRAST, CT CHEST  PULMONARY EMBOLISM W CONTRAST  LOCATION: [facility]  DATE: 2/19/2025    INDICATION: Chest pain. Diffuse epigastric abdominal pain. Vomiting. Elevated d-dimer.  COMPARISON: CT abdomen/pelvis 11/19/2023  TECHNIQUE: CT chest pulmonary angiogram and routine CT abdomen pelvis with IV contrast. Arterial phase through the chest and venous phase through the abdomen and pelvis. Multiplanar reformats and MIP reconstructions were performed. Dose reduction   techniques were used.   CONTRAST: ISOVUE 370 75 mL    FINDINGS:   ANGIOGRAM CHEST: Pulmonary arteries are normal caliber and negative for pulmonary emboli. Mild aortic calcifications. Thoracic aorta is negative for dissection. No CT evidence of right heart strain.     LUNGS AND PLEURA: Right upper lobe nodules measuring 0.3 cm (series 5, image 67) and 0.3 cm (image 83). No focal airspace disease. No pleural effusion or pneumothorax.    MEDIASTINUM/AXILLAE: No lymphadenopathy. No pericardial effusion.    CORONARY ARTERY CALCIFICATION: None.    HEPATOBILIARY: Normal.    PANCREAS: Normal.    SPLEEN: Normal.    ADRENAL GLANDS: Normal.    KIDNEYS/BLADDER: Unchanged left renal upper pole simple cyst measuring 0.8 cm; no follow-up indicated. Kidneys otherwise appear normal. No hydronephrosis. Underdistended urinary bladder. Mild concentric urinary bladder wall thickening.    BOWEL: No obstruction or inflammatory change. Normal appendix. No evidence of acute appendicitis.    LYMPH NODES: No lymphadenopathy.    VASCULATURE: Minimal atherosclerotic calcifications.    PELVIC ORGANS: Unremarkable.    MUSCULOSKELETAL: Mild multilevel degenerative changes of the spine.      Impression    IMPRESSION:   1.  No acute findings in the chest. No acute pulmonary embolism.    2.  Incidental right upper lobe nodules measuring 0.3 cm. If no history of malignancy, consider follow-up per Fleischner recommendations: For low-risk patients, no routine follow-up required. For  high-risk patients, optional CT at 12 months.    3.  Mild urinary bladder wall thickening, possibly due to underdistention. However, correlate with urinalysis to assess for cystitis.    4.  Otherwise, no acute findings in the abdomen or pelvis.      [Consider Follow Up: Incidental right upper lobe nodules]    This report will be copied to the [facility] to ensure a provider acknowledges the finding.      CT Abdomen Pelvis w Contrast     Value    Radiologist flags Incidental right upper lobe nodules    Narrative    EXAM: CT ABDOMEN PELVIS W CONTRAST, CT CHEST PULMONARY EMBOLISM W CONTRAST  LOCATION: [facility]  DATE: 2/19/2025    INDICATION: Chest pain. Diffuse epigastric abdominal pain. Vomiting. Elevated d-dimer.  COMPARISON: CT abdomen/pelvis 11/19/2023  TECHNIQUE: CT chest pulmonary angiogram and routine CT abdomen pelvis with IV contrast. Arterial phase through the chest and venous phase through the abdomen and pelvis. Multiplanar reformats and MIP reconstructions were performed. Dose reduction   techniques were used.   CONTRAST: ISOVUE 370 75 mL    FINDINGS:   ANGIOGRAM CHEST: Pulmonary arteries are normal caliber and negative for pulmonary emboli. Mild aortic calcifications. Thoracic aorta is negative for dissection. No CT evidence of right heart strain.     LUNGS AND PLEURA: Right upper lobe nodules measuring 0.3 cm (series 5, image 67) and 0.3 cm (image 83). No focal airspace disease. No pleural effusion or pneumothorax.    MEDIASTINUM/AXILLAE: No lymphadenopathy. No pericardial effusion.    CORONARY ARTERY CALCIFICATION: None.    HEPATOBILIARY: Normal.    PANCREAS: Normal.    SPLEEN: Normal.    ADRENAL GLANDS: Normal.    KIDNEYS/BLADDER: Unchanged left renal upper pole simple cyst measuring 0.8 cm; no follow-up indicated. Kidneys otherwise appear normal. No hydronephrosis. Underdistended urinary bladder. Mild concentric urinary bladder wall thickening.    BOWEL: No  obstruction or inflammatory change. Normal appendix. No evidence of acute appendicitis.    LYMPH NODES: No lymphadenopathy.    VASCULATURE: Minimal atherosclerotic calcifications.    PELVIC ORGANS: Unremarkable.    MUSCULOSKELETAL: Mild multilevel degenerative changes of the spine.      Impression    IMPRESSION:   1.  No acute findings in the chest. No acute pulmonary embolism.    2.  Incidental right upper lobe nodules measuring 0.3 cm. If no history of malignancy, consider follow-up per Fleischner recommendations: For low-risk patients, no routine follow-up required. For high-risk patients, optional CT at 12 months.    3.  Mild urinary bladder wall thickening, possibly due to underdistention. However, correlate with urinalysis to assess for cystitis.    4.  Otherwise, no acute findings in the abdomen or pelvis.      [Consider Follow Up: Incidental right upper lobe nodules]    This report will be copied to the [facility] to ensure a provider acknowledges the finding.      Echocardiogram Complete     Value    LVEF  30-35% (moderately reduced)    Narrative    [id number]  [id number]  [id number]  [id number]^ELZA^[place] [address]     Name: KRISTEN VEGA  MRN: [id number]  : 1970  Study Date: 2025 07:06 AM  Age: 54 yrs  Gender: Female  Patient Location: [facility]  Reason For Study: Chest Pain  Ordering Physician: LIZ BERTRAND  Performed By: MELA     BSA: 1.7 m2  Height: 66 in  Weight: 130 lb  HR: 69  ______________________________________________________________________________  Procedure  Echocardiogram with two-dimensional, color and spectral Doppler. Definity (NDC  #70848-004) given intravenously. Adequate quality two-dimensional was  performed and interpreted. There is no comparison study available.  ______________________________________________________________________________  Interpretation Summary     The left ventricle is normal  in size. There is mild concentric left  ventricular hypertrophy.  Left ventricular function is decreased. The ejection fraction is 30-35%  (moderately reduced). There is severe hypokinesis of the mid to apical  segments with relatively preserved function of the basal segments. Consider a  diagnosis of Takotsubo Cardiomyopathy.     The right ventricle is normal in size and function.  Normal left atrial size. Right atrial size is normal.  IVC diameter <2.1 cm collapsing >50% with sniff suggests a normal RA pressure  of 3 mmHg.  There is no comparison study available.  ______________________________________________________________________________  Left Ventricle  The left ventricle is normal in size. Left ventricular function is decreased.  The ejection fraction is 30-35% (moderately reduced). There is mild concentric  left ventricular hypertrophy. Left ventricular diastolic function is  indeterminate. There is severe hypokinesis of the mid to apical segments with  relatively preserved function of the basal segments. Consider a diagnosis of  Takotsubo Cardiomyopathy.     Right Ventricle  The right ventricle is normal in size and function.     Atria  Normal left atrial size. Right atrial size is normal.     Mitral Valve  Mitral valve leaflets appear normal. There is trace mitral regurgitation.  There is no mitral valve stenosis.     Tricuspid Valve  Tricuspid valve leaflets appear normal. There is trace tricuspid  regurgitation. Right ventricular systolic pressure could not be approximated  due to inadequate tricuspid regurgitation.     Aortic Valve  The aortic valve is trileaflet. Aortic valve leaflets appear normal. No aortic  regurgitation is present. No aortic stenosis is present.     Pulmonic Valve  The pulmonic valve is not well visualized. There is trace pulmonic valvular  regurgitation.     Vessels  The aorta root is normal. IVC diameter <2.1 cm collapsing >50% with sniff  suggests a normal RA pressure of 3  mmHg.     Pericardium  There is no pericardial effusion.     Rhythm  Sinus rhythm was noted.  ______________________________________________________________________________  MMode/2D Measurements & Calculations  IVSd: 1.2 cm  LVIDd: 4.2 cm  LVIDs: 3.0 cm  LVPWd: 1.2 cm  FS: 29.5 %  LV mass(C)d: 169.6 grams  LV mass(C)dI: 101.8 grams/m2  Ao root diam: 2.8 cm  LA dimension: 3.1 cm  asc Aorta Diam: 2.9 cm  LA/Ao: 1.1  LVOT diam: 1.9 cm  LVOT area: 2.8 cm2  Ao root diam index Ht(cm/m): 1.7  Ao root diam index BSA (cm/m2): 1.7  Asc Ao diam index BSA (cm/m2): 1.7  Asc Ao diam index Ht(cm/m): 1.7  EF Biplane: 39.0 %     LA Volume Indexed (AL/bp): 22.1 ml/m2  RV Base: 3.0 cm  RWT: 0.55  TAPSE: 2.6 cm     Time Measurements  MM HR: 67.0 BPM     Doppler Measurements & Calculations  MV E max stiven: 110.0 cm/sec  MV A max stiven: 47.6 cm/sec  MV E/A: 2.3  MV max PG: 3.4 mmHg  MV mean P.0 mmHg  MV V2 VTI: 29.1 cm  MVA(VTI): 2.0 cm2  MV dec slope: 538.0 cm/sec2  MV dec time: 0.21 sec  Ao V2 max: 125.0 cm/sec  Ao max P.0 mmHg  Ao V2 mean: 86.7 cm/sec  Ao mean PG: 3.0 mmHg  Ao V2 VTI: 25.9 cm  ONEYDA(I,D): 2.3 cm2  ONEYDA(V,D): 2.2 cm2     LV V1 max PG: 3.7 mmHg  LV V1 max: 96.4 cm/sec  LV V1 VTI: 20.6 cm  SV(LVOT): 58.4 ml  SI(LVOT): 35.1 ml/m2  PA acc time: 0.20 sec  AV Stiven Ratio (DI): 0.77  ONEYDA Index (cm2/m2): 1.4  E/E': 14.1  E/E' av.5  Lateral E/e': 13.0  Medial E/e': 14.0  Peak E' Stiven: 7.8 cm/sec  RV S Stiven: 11.1 cm/sec     ______________________________________________________________________________  Report approved by: Alexander Bullock MD on 2025 08:58 AM             Discharge Medications   Current Discharge Medication List        CONTINUE these medications which have NOT CHANGED    Details   benazepril (LOTENSIN) 40 MG tablet Take 1 tablet (40 mg) by mouth daily for 14 days.  Qty: 14 tablet, Refills: 0      multivitamin w/minerals (MULTI-VITAMIN) tablet Take 1 tablet by mouth daily.           Allergies   Allergies    Allergen Reactions    Nka [No Known Allergies]

## 2025-02-20 NOTE — PHARMACY-ADMISSION MEDICATION HISTORY
Pharmacist Admission Medication History    Admission medication history is complete. The information provided in this note is only as accurate as the sources available at the time of the update.    Medication reconciliation/reorder completed by provider prior to medication history? No    Information Source(s): Patient and CareEverywhere/SureScripts via in-person    Pertinent Information:     Changes made to PTA medication list:  Added: mvi  Deleted: benazepril 20mg, citalopram, omeprazole, vit d  Changed: now taking benazepril 40mg    Allergies reviewed with patient and updates made in EHR: yes    Medications available for use during hospital stay: NONE.     Medication History Completed By: Aditya Cantu RPH 2/20/2025 10:04 AM    PTA Med List   Medication Sig Last Dose/Taking    benazepril (LOTENSIN) 40 MG tablet Take 1 tablet (40 mg) by mouth daily for 14 days. 2/19/2025 Morning    multivitamin w/minerals (MULTI-VITAMIN) tablet Take 1 tablet by mouth daily. 2/19/2025 Morning

## 2025-02-20 NOTE — H&P (VIEW-ONLY)
[facility] HEART [facility]   [address], [place] [address]   [email or web address]   OFFICE: [phone]     CARDIOLOGY INPATIENT CONSULT NOTE     Impression and Plan     Assessment:  NSTEMI - with acute chest pain (after nausea and vomiting), elevated troponin, and new cardiomyopathy. ECG has new lateral TWI suspicious for ischemia.  Secondary cardiomyopathy - new diagnosis - echo findings are suspicious for Tako-tsubo, though by my personal interpretation the anterior wall is affected to a greater extent than the inferior wall. coronary anatomy has not been defined.   Hypertension    Plan:  With recurrent chest pain relieved with NTG will plan to proceed with coronary  angiogram with left LHC and possible PCI.Discussed with the patient the risks and benefits of left heart catheterization with coronary angiogram including possible PCI. The risks include but are not limited to death, myocardial infarction, stroke, kidney dysfunction, vessel trauma, hemorrhage, need for emergency corrective surgery, allergy, and dysrhythmia. Will use renal protective protocol due to administration of contrast for CT scan last evening.  Continue heparin gtt  Start IV normal saline at 75 ml/hr  Start rosuvastatin  Will wait to start ARB until after her procedure  Start metoprolol succinate 25 mg daily  Will need transfer to [facility] for the procedure    Discussed with Dr. Kc    Primary Cardiologist: not yet established.    History of Present Illness      Ms. Bharath Forbes is a 54 year old female with hypertension who presented for chest pain. Ms. Forbes was at work () when she developed sudden nausea and vomiting. This was followed by severe chest pain and pressure. Non-radiating. No associated symptoms. Unable to exert herself due to severity of pain. Found to have elevated troponin. Started on heparin. Had chest pain again this morning that was relieved with NTG.    Other than  noted above, Ms. Forbes denies any chest pain/pressure/tightness, shortness of breath at rest or with exertion, light headedness/dizziness, pre-syncope, syncope, lower extremity swelling, palpitations, paroxysmal nocturnal dyspnea (PND), or orthopnea.    Review of Systems:  Further review of systems is otherwise negative/noncontributory (based on review of medical record (admission H&P) and 13 point review of systems reviewed. Pertinent positives noted).    Cardiac Diagnostics     ECG: Personally reviewed and interpreted: sinus rhythm with lateral TWI suspicious for ischemia    Telemetry (personally reviewed): sinus rhythm.    Most recent:  Echocardiogram (results reviewed):   TTE 2/20/2025  The left ventricle is normal in size. There is mild concentric left ventricular hypertrophy.  Left ventricular function is decreased. The ejection fraction is 30-35% (moderately reduced). There is severe hypokinesis of the mid to apical segments with relatively preserved function of the basal segments. Consider a diagnosis of Takotsubo Cardiomyopathy.     The right ventricle is normal in size and function.  Normal left atrial size. Right atrial size is normal.  IVC diameter <2.1 cm collapsing >50% with sniff suggests a normal RA pressure of 3 mmHg.  There is no comparison study available.      Medical History  Surgical History Family History Social History   Past Medical History:   Diagnosis Date    ASCUS with positive high risk HPV cervical 3/16/2021    Positive HPV for other high risk types and pap showed ASCUS. Prior pap with cotesting in 2015 was normal. Per 2019 ASCCP guidelines 5 year risk of CIN3+ 3.6% so requires 1 year follow up for repeat cotesting.   Charles Francis MD    HTN (hypertension)     Hypertension      Past Surgical History:   Procedure Laterality Date    NO HISTORY OF SURGERY      [facility] COLONOSCOPY W/WO BRUSH/WASH N/A 2/6/2020    Procedure: COLONOSCOPY;  Surgeon: Hector Renae III, MD;  Location: [facility]  OR;  Service: Gastroenterology     Family History   Problem Relation Age of Onset    Hypertension Mother     Diabetes No family hx of     Coronary Artery Disease No family hx of     Breast Cancer No family hx of     Cancer - colorectal No family hx of     Ovarian Cancer No family hx of     Prostate Cancer No family hx of     Other Cancer No family hx of     Cerebrovascular Disease No family hx of     Thyroid Disease No family hx of     Asthma No family hx of            Social History     Socioeconomic History    Marital status: Single     Spouse name: Not on file    Number of children: 3    Years of education: Not on file    Highest education level: Not on file   Occupational History    Occupation:    Tobacco Use    Smoking status: Never    Smokeless tobacco: Never   Substance and Sexual Activity    Alcohol use: Yes     Alcohol/week: 0.0 standard drinks of alcohol     Comment: Ocassionally    Drug use: Yes     Types: Marijuana    Sexual activity: Yes     Partners: Male     Birth control/protection: Condom   Other Topics Concern    Parent/sibling w/ CABG, MI or angioplasty before 65F 55M? Not Asked   Social History Narrative    Not on file     Social Drivers of Health     Financial Resource Strain: Low Risk  (12/1/2023)    Financial Resource Strain     Within the past 12 months, have you or your family members you live with been unable to get utilities (heat, electricity) when it was really needed?: No   Food Insecurity: Low Risk  (12/1/2023)    Food Insecurity     Within the past 12 months, did you worry that your food would run out before you got money to buy more?: No     Within the past 12 months, did the food you bought just not last and you didn t have money to get more?: No   Transportation Needs: Low Risk  (12/1/2023)    Transportation Needs     Within the past 12 months, has lack of transportation kept you from medical appointments, getting your medicines, non-medical meetings or appointments,  "work, or from getting things that you need?: No   Recent Concern: Transportation Needs - High Risk (10/30/2023)    Transportation Needs     Within the past 12 months, has lack of transportation kept you from medical appointments, getting your medicines, non-medical meetings or appointments, work, or from getting things that you need?: Yes   Physical Activity: Not on file   Stress: Not on file   Social Connections: Not on file   Interpersonal Safety: Low Risk  (10/30/2023)    Interpersonal Safety     Do you feel physically and emotionally safe where you currently live?: Yes     Within the past 12 months, have you been hit, slapped, kicked or otherwise physically hurt by someone?: No     Within the past 12 months, have you been humiliated or emotionally abused in other ways by your partner or ex-partner?: No   Housing Stability: Low Risk  (12/1/2023)    Housing Stability     Do you have housing? : Yes     Are you worried about losing your housing?: No             Physical Examination   VITALS: /81 (BP Location: Right arm)   Pulse 55   Temp 98.3  F (36.8  C) (Oral)   Resp 11   Ht 1.676 m (5' 6\")   Wt 59 kg (130 lb)   SpO2 100%   BMI 20.98 kg/m    BMI: Body mass index is 20.98 kg/m .  Wt Readings from Last 3 Encounters:   02/19/25 59 kg (130 lb)   11/02/24 61.2 kg (135 lb)   12/29/23 61.2 kg (135 lb)       Intake/Output Summary (Last 24 hours) at 2/20/2025 0915  Last data filed at 2/20/2025 0426  Gross per 24 hour   Intake --   Output 300 ml   Net -300 ml       General: pleasant female. No acute distress.   HENT: external ears normal. Nares patent. Mucous membranes moist.  Eyes: perrla, extraocular muscles intact. No scleral icterus.   Neck: No JVD  Lungs: clear to auscultation  COR: regular rate and rhythm, No murmurs, rubs, or gallops  Abd: nondistended, BS present  Extrem: No edema         Non-cardiac Imaging Studies Reviewed      CT chest PE protocol with a/p  IMPRESSION:   1.  No acute findings in the " "chest. No acute pulmonary embolism.     2.  Incidental right upper lobe nodules measuring 0.3 cm. If no history of malignancy, consider follow-up per Fleischner recommendations: For low-risk patients, no routine follow-up required. For high-risk patients, optional CT at 12 months.     3.  Mild urinary bladder wall thickening, possibly due to underdistention. However, correlate with urinalysis to assess for cystitis.     4.  Otherwise, no acute findings in the abdomen or pelvis.        Lab Results Reviewed    Chemistry/lipid CBC Cardiac Enzymes/BNP/TSH/INR   Recent Labs   Lab Test 02/20/25  0616   CHOL 153   HDL 72   LDL 74   TRIG 36     Recent Labs   Lab Test 02/20/25  0616 10/30/23  1154 10/16/18  1531   LDL 74 87 77     Recent Labs   Lab Test 02/20/25  0616      POTASSIUM 4.0   CHLORIDE 103   CO2 27   GLC 96   BUN 8.0   CR 0.65   GFRESTIMATED >90   GENESIS 9.0     Recent Labs   Lab Test 02/20/25  0616 02/19/25  2046 11/02/24  1555   CR 0.65 0.74 1.01*     Recent Labs   Lab Test 08/28/23  1054   A1C 5.8*          Recent Labs   Lab Test 02/20/25  0616   WBC 7.5   HGB 11.4*   HCT 34.1*   MCV 83        Recent Labs   Lab Test 02/20/25  0616 02/19/25  2318 02/19/25  2046   HGB 11.4* 12.2 12.6    No results for input(s): \"TROPONINI\" in the last 19671 hours.  No results for input(s): \"BNP\", \"NTBNPI\", \"NTBNP\" in the last 11274 hours.  Recent Labs   Lab Test 11/02/24  1555   TSH 0.75     No results for input(s): \"INR\" in the last 96003 hours.        Current Inpatient Scheduled Medications   Scheduled Meds:  Current Facility-Administered Medications   Medication Dose Route Frequency Provider Last Rate Last Admin    aspirin (ASA) chewable tablet 162 mg  162 mg Oral Daily Peter Garcia MD        sodium chloride (PF) 0.9% PF flush 3 mL  3 mL Intracatheter Q8H Peter Garcia MD         Continuous Infusions:  Current Facility-Administered Medications   Medication Dose Route Frequency Provider Last Rate Last " Admin    heparin 25,000 units in 0.45% NaCl 250 mL ANTICOAGULANT infusion  0-5,000 Units/hr Intravenous Continuous Yoselin Denton MD 7.5 mL/hr at 02/20/25 0700 750 Units/hr at 02/20/25 0700       Current Outpatient Medications   Medication Sig Dispense Refill    benazepril (LOTENSIN) 20 MG tablet Take 1 tablet (20 mg) by mouth daily 30 tablet 0    benazepril (LOTENSIN) 40 MG tablet Take 1 tablet (40 mg) by mouth daily for 14 days. 14 tablet 0    citalopram (CELEXA) 10 MG tablet Take 1 tablet (10 mg) by mouth daily 30 tablet 1    omeprazole (PRILOSEC) 20 MG DR capsule Take 1 capsule (20 mg) by mouth daily      vitamin D3 (CHOLECALCIFEROL) 10 MCG (400 UNIT) capsule Take 2 capsules (800 Units) by mouth daily 180 capsule 3          Medications Prior to Admission   Prior to Admission medications    Medication Sig Start Date End Date Taking? Authorizing Provider   benazepril (LOTENSIN) 20 MG tablet Take 1 tablet (20 mg) by mouth daily 12/29/23   Estella Ingram MD   benazepril (LOTENSIN) 40 MG tablet Take 1 tablet (40 mg) by mouth daily for 14 days. 11/2/24 11/16/24  Daija Pepe PA-C   citalopram (CELEXA) 10 MG tablet Take 1 tablet (10 mg) by mouth daily 12/1/23   Windy Vines MD   omeprazole (PRILOSEC) 20 MG DR capsule Take 1 capsule (20 mg) by mouth daily 12/11/23   Windy Vines MD   vitamin D3 (CHOLECALCIFEROL) 10 MCG (400 UNIT) capsule Take 2 capsules (800 Units) by mouth daily 12/4/23   Windy Vines MD              Clinically Significant Risk Factors Present on Admission        # Hypokalemia: Lowest K = 3.1 mmol/L in last 2 days, will replace as needed            # Hypertension: Noted on problem list  # Chronic heart failure with reduced ejection fraction: last echo with EF <40%

## 2025-02-20 NOTE — LETTER
[facility] HEART CARE  [address] [place] [id number]  Phone: [phone]  Fax: [phone]    February 20, 2025        Bharath Forbes  [address] [place] [address]          To whom it may concern:    RE: Bharath Brantleycomb    Patient was seen and treated today at our hospital today. Due to activity restrictions following her procedure should remain out of work unitil 2/27/2025.     Please contact me for questions or concerns.      Sincerely,      Donato Landry MD

## 2025-02-20 NOTE — Clinical Note
left ventricle Cine(s)  injected and visualized utilizing power injector system.Rate (mL/sec) 10 Total Volume (mL) 40

## 2025-02-20 NOTE — CONSULTS
[facility] HEART [facility]   [address], [place] [address]   [email or web address]   OFFICE: [phone]     CARDIOLOGY INPATIENT CONSULT NOTE     Impression and Plan     Assessment:  NSTEMI - with acute chest pain (after nausea and vomiting), elevated troponin, and new cardiomyopathy. ECG has new lateral TWI suspicious for ischemia.  Secondary cardiomyopathy - new diagnosis - echo findings are suspicious for Tako-tsubo, though by my personal interpretation the anterior wall is affected to a greater extent than the inferior wall. coronary anatomy has not been defined.   Hypertension    Plan:  With recurrent chest pain relieved with NTG will plan to proceed with coronary  angiogram with left LHC and possible PCI.Discussed with the patient the risks and benefits of left heart catheterization with coronary angiogram including possible PCI. The risks include but are not limited to death, myocardial infarction, stroke, kidney dysfunction, vessel trauma, hemorrhage, need for emergency corrective surgery, allergy, and dysrhythmia. Will use renal protective protocol due to administration of contrast for CT scan last evening.  Continue heparin gtt  Start IV normal saline at 75 ml/hr  Start rosuvastatin  Will wait to start ARB until after her procedure  Start metoprolol succinate 25 mg daily  Will need transfer to [facility] for the procedure    Discussed with Dr. Kc    Primary Cardiologist: not yet established.    History of Present Illness      Ms. Bharath Forbes is a 54 year old female with hypertension who presented for chest pain. Ms. Forbes was at work () when she developed sudden nausea and vomiting. This was followed by severe chest pain and pressure. Non-radiating. No associated symptoms. Unable to exert herself due to severity of pain. Found to have elevated troponin. Started on heparin. Had chest pain again this morning that was relieved with NTG.    Other than  noted above, Ms. Forbes denies any chest pain/pressure/tightness, shortness of breath at rest or with exertion, light headedness/dizziness, pre-syncope, syncope, lower extremity swelling, palpitations, paroxysmal nocturnal dyspnea (PND), or orthopnea.    Review of Systems:  Further review of systems is otherwise negative/noncontributory (based on review of medical record (admission H&P) and 13 point review of systems reviewed. Pertinent positives noted).    Cardiac Diagnostics     ECG: Personally reviewed and interpreted: sinus rhythm with lateral TWI suspicious for ischemia    Telemetry (personally reviewed): sinus rhythm.    Most recent:  Echocardiogram (results reviewed):   TTE 2/20/2025  The left ventricle is normal in size. There is mild concentric left ventricular hypertrophy.  Left ventricular function is decreased. The ejection fraction is 30-35% (moderately reduced). There is severe hypokinesis of the mid to apical segments with relatively preserved function of the basal segments. Consider a diagnosis of Takotsubo Cardiomyopathy.     The right ventricle is normal in size and function.  Normal left atrial size. Right atrial size is normal.  IVC diameter <2.1 cm collapsing >50% with sniff suggests a normal RA pressure of 3 mmHg.  There is no comparison study available.      Medical History  Surgical History Family History Social History   Past Medical History:   Diagnosis Date    ASCUS with positive high risk HPV cervical 3/16/2021    Positive HPV for other high risk types and pap showed ASCUS. Prior pap with cotesting in 2015 was normal. Per 2019 ASCCP guidelines 5 year risk of CIN3+ 3.6% so requires 1 year follow up for repeat cotesting.   Charles Francis MD    HTN (hypertension)     Hypertension      Past Surgical History:   Procedure Laterality Date    NO HISTORY OF SURGERY      [facility] COLONOSCOPY W/WO BRUSH/WASH N/A 2/6/2020    Procedure: COLONOSCOPY;  Surgeon: Hector Renae III, MD;  Location: [facility]  OR;  Service: Gastroenterology     Family History   Problem Relation Age of Onset    Hypertension Mother     Diabetes No family hx of     Coronary Artery Disease No family hx of     Breast Cancer No family hx of     Cancer - colorectal No family hx of     Ovarian Cancer No family hx of     Prostate Cancer No family hx of     Other Cancer No family hx of     Cerebrovascular Disease No family hx of     Thyroid Disease No family hx of     Asthma No family hx of            Social History     Socioeconomic History    Marital status: Single     Spouse name: Not on file    Number of children: 3    Years of education: Not on file    Highest education level: Not on file   Occupational History    Occupation:    Tobacco Use    Smoking status: Never    Smokeless tobacco: Never   Substance and Sexual Activity    Alcohol use: Yes     Alcohol/week: 0.0 standard drinks of alcohol     Comment: Ocassionally    Drug use: Yes     Types: Marijuana    Sexual activity: Yes     Partners: Male     Birth control/protection: Condom   Other Topics Concern    Parent/sibling w/ CABG, MI or angioplasty before 65F 55M? Not Asked   Social History Narrative    Not on file     Social Drivers of Health     Financial Resource Strain: Low Risk  (12/1/2023)    Financial Resource Strain     Within the past 12 months, have you or your family members you live with been unable to get utilities (heat, electricity) when it was really needed?: No   Food Insecurity: Low Risk  (12/1/2023)    Food Insecurity     Within the past 12 months, did you worry that your food would run out before you got money to buy more?: No     Within the past 12 months, did the food you bought just not last and you didn t have money to get more?: No   Transportation Needs: Low Risk  (12/1/2023)    Transportation Needs     Within the past 12 months, has lack of transportation kept you from medical appointments, getting your medicines, non-medical meetings or appointments,  "work, or from getting things that you need?: No   Recent Concern: Transportation Needs - High Risk (10/30/2023)    Transportation Needs     Within the past 12 months, has lack of transportation kept you from medical appointments, getting your medicines, non-medical meetings or appointments, work, or from getting things that you need?: Yes   Physical Activity: Not on file   Stress: Not on file   Social Connections: Not on file   Interpersonal Safety: Low Risk  (10/30/2023)    Interpersonal Safety     Do you feel physically and emotionally safe where you currently live?: Yes     Within the past 12 months, have you been hit, slapped, kicked or otherwise physically hurt by someone?: No     Within the past 12 months, have you been humiliated or emotionally abused in other ways by your partner or ex-partner?: No   Housing Stability: Low Risk  (12/1/2023)    Housing Stability     Do you have housing? : Yes     Are you worried about losing your housing?: No             Physical Examination   VITALS: /81 (BP Location: Right arm)   Pulse 55   Temp 98.3  F (36.8  C) (Oral)   Resp 11   Ht 1.676 m (5' 6\")   Wt 59 kg (130 lb)   SpO2 100%   BMI 20.98 kg/m    BMI: Body mass index is 20.98 kg/m .  Wt Readings from Last 3 Encounters:   02/19/25 59 kg (130 lb)   11/02/24 61.2 kg (135 lb)   12/29/23 61.2 kg (135 lb)       Intake/Output Summary (Last 24 hours) at 2/20/2025 0915  Last data filed at 2/20/2025 0426  Gross per 24 hour   Intake --   Output 300 ml   Net -300 ml       General: pleasant female. No acute distress.   HENT: external ears normal. Nares patent. Mucous membranes moist.  Eyes: perrla, extraocular muscles intact. No scleral icterus.   Neck: No JVD  Lungs: clear to auscultation  COR: regular rate and rhythm, No murmurs, rubs, or gallops  Abd: nondistended, BS present  Extrem: No edema         Non-cardiac Imaging Studies Reviewed      CT chest PE protocol with a/p  IMPRESSION:   1.  No acute findings in the " "chest. No acute pulmonary embolism.     2.  Incidental right upper lobe nodules measuring 0.3 cm. If no history of malignancy, consider follow-up per Fleischner recommendations: For low-risk patients, no routine follow-up required. For high-risk patients, optional CT at 12 months.     3.  Mild urinary bladder wall thickening, possibly due to underdistention. However, correlate with urinalysis to assess for cystitis.     4.  Otherwise, no acute findings in the abdomen or pelvis.        Lab Results Reviewed    Chemistry/lipid CBC Cardiac Enzymes/BNP/TSH/INR   Recent Labs   Lab Test 02/20/25  0616   CHOL 153   HDL 72   LDL 74   TRIG 36     Recent Labs   Lab Test 02/20/25  0616 10/30/23  1154 10/16/18  1531   LDL 74 87 77     Recent Labs   Lab Test 02/20/25  0616      POTASSIUM 4.0   CHLORIDE 103   CO2 27   GLC 96   BUN 8.0   CR 0.65   GFRESTIMATED >90   GENESIS 9.0     Recent Labs   Lab Test 02/20/25  0616 02/19/25  2046 11/02/24  1555   CR 0.65 0.74 1.01*     Recent Labs   Lab Test 08/28/23  1054   A1C 5.8*          Recent Labs   Lab Test 02/20/25  0616   WBC 7.5   HGB 11.4*   HCT 34.1*   MCV 83        Recent Labs   Lab Test 02/20/25  0616 02/19/25  2318 02/19/25  2046   HGB 11.4* 12.2 12.6    No results for input(s): \"TROPONINI\" in the last 67131 hours.  No results for input(s): \"BNP\", \"NTBNPI\", \"NTBNP\" in the last 89251 hours.  Recent Labs   Lab Test 11/02/24  1555   TSH 0.75     No results for input(s): \"INR\" in the last 31726 hours.        Current Inpatient Scheduled Medications   Scheduled Meds:  Current Facility-Administered Medications   Medication Dose Route Frequency Provider Last Rate Last Admin    aspirin (ASA) chewable tablet 162 mg  162 mg Oral Daily Peter Garcia MD        sodium chloride (PF) 0.9% PF flush 3 mL  3 mL Intracatheter Q8H Peter Garcia MD         Continuous Infusions:  Current Facility-Administered Medications   Medication Dose Route Frequency Provider Last Rate Last " Admin    heparin 25,000 units in 0.45% NaCl 250 mL ANTICOAGULANT infusion  0-5,000 Units/hr Intravenous Continuous Yoselin Denton MD 7.5 mL/hr at 02/20/25 0700 750 Units/hr at 02/20/25 0700       Current Outpatient Medications   Medication Sig Dispense Refill    benazepril (LOTENSIN) 20 MG tablet Take 1 tablet (20 mg) by mouth daily 30 tablet 0    benazepril (LOTENSIN) 40 MG tablet Take 1 tablet (40 mg) by mouth daily for 14 days. 14 tablet 0    citalopram (CELEXA) 10 MG tablet Take 1 tablet (10 mg) by mouth daily 30 tablet 1    omeprazole (PRILOSEC) 20 MG DR capsule Take 1 capsule (20 mg) by mouth daily      vitamin D3 (CHOLECALCIFEROL) 10 MCG (400 UNIT) capsule Take 2 capsules (800 Units) by mouth daily 180 capsule 3          Medications Prior to Admission   Prior to Admission medications    Medication Sig Start Date End Date Taking? Authorizing Provider   benazepril (LOTENSIN) 20 MG tablet Take 1 tablet (20 mg) by mouth daily 12/29/23   Estella Ingram MD   benazepril (LOTENSIN) 40 MG tablet Take 1 tablet (40 mg) by mouth daily for 14 days. 11/2/24 11/16/24  Daija Pepe PA-C   citalopram (CELEXA) 10 MG tablet Take 1 tablet (10 mg) by mouth daily 12/1/23   Windy Vines MD   omeprazole (PRILOSEC) 20 MG DR capsule Take 1 capsule (20 mg) by mouth daily 12/11/23   Windy Vines MD   vitamin D3 (CHOLECALCIFEROL) 10 MCG (400 UNIT) capsule Take 2 capsules (800 Units) by mouth daily 12/4/23   Windy Vines MD              Clinically Significant Risk Factors Present on Admission        # Hypokalemia: Lowest K = 3.1 mmol/L in last 2 days, will replace as needed            # Hypertension: Noted on problem list  # Chronic heart failure with reduced ejection fraction: last echo with EF <40%

## 2025-02-20 NOTE — PROGRESS NOTES
Report given to TERRANCE Cox at [facility] lab.    Asia Connor RN on 2/20/2025 at 12:52 PM

## 2025-02-20 NOTE — PROGRESS NOTES
[facility]    Medicine Progress Note - Hospitalist Service    Date of Admission:  2/20/2025    Assessment & Plan   Bharath Forbes is a 54 year old female with a PMH significant for HTN, anorexia who was initially admitted on [facility] on 02/19/2025 for NSTEMI. Patient was evaluated by Cardiology and recommended patient transfer to [facility] for coronary angiorma.     NSTEMI  CAD  Stress cardiomyopathy, EF 30% 02/20/2025  HTN   Initially presented with acute chest pain. Ecg with new lateral TWI. Trop elevated 108->241. Pain relieved with nitroglycerin. Started on heparin.  Initially presented to [facility], but recommended to transfer to [facility] for coronary angiogram which showed:   1.  Large-caliber coronary arteries in a codominant system.    2.  30% proximal to mid LAD narrowing.  Remainder of the LAD appears normal.  3.  Large codominant left circumflex appears normal.  4.  Small caliber RCA supplies PDA only.  No stenoses.  5.  Akinesia of large segment of mid anterior wall and mid inferior wall with preserved apical function and hyperdynamic basilar function of anterior and inferior segments.  EF 30% by visual estimate.  Pattern consistent with stress cardiomyopathy.  Currently VSS, no chest or dyspnea appreciated.   -Cardiology managing  -Continue statin and ASA and beta blocker   -Holding PTA benazepril, per Cardiology note, recommended resumption of ARB following procedure     Elevated lipase: elevated to 90, but doesn't meet criteria for acute pancreatitis. On imagine, no findings consistent with acute pancreatitis or calcified pancreas. No abdominal pain, nausea or vomiting.     Normocytic anemia: possibly in the setting of heparin. No acute sx or sxs of bleeding. CBC in AM     Pulmonary nodules: 0.3cm nodules noted in RUL, incidental finding. No respiratory sxs at this time.     Reported weight loss  Reports unintentional 30lb weight over the past several months although  weight recorded at PCP visit from 04/2024 with weight of 136lb therefore appears relatively stable from ~ 1 year prior.   -Monitor     Anorexia: no PTA meds   Prediabetes: hx of A1c from >1 year ago of 5.8%, follow up with PCP as OP for recheck     Resolved:   Hypokalemia, mild, resolved  Leukocytosis, mid, resolved         Diet: NPO per Anesthesia Guidelines for Procedure/Surgery Except for: Meds  Advance Diet as Tolerated: Clear Liquid Diet    DVT Prophylaxis: Pneumatic Compression Devices  Todd Catheter: Not present  Lines: None     Cardiac Monitoring: ACTIVE order. Indication: Post- PCI/Angiogram (24 hours)  Code Status: Full Code      Clinically Significant Risk Factors Present on Admission        # Hypokalemia: Lowest K = 3.1 mmol/L in last 2 days, will replace as needed            # Hypertension: Noted on problem list  # Chronic heart failure with reduced ejection fraction: last echo with EF <40%                     Social Drivers of Health    Transportation Needs: Low Risk  (12/1/2023)    Transportation Needs     Within the past 12 months, has lack of transportation kept you from medical appointments, getting your medicines, non-medical meetings or appointments, work, or from getting things that you need?: No   Recent Concern: Transportation Needs - High Risk (10/30/2023)    Transportation Needs     Within the past 12 months, has lack of transportation kept you from medical appointments, getting your medicines, non-medical meetings or appointments, work, or from getting things that you need?: Yes          Disposition Plan     Medically Ready for Discharge: Anticipated in 2-4 Days           The patient's care was discussed with the Attending Physician, Dr. Cordero, Bedside Nurse, and Patient.    Yelena Vivas PA-C  Hospitalist Service  [facility]  Securely message with [employer] (more info)  Text page via [facility] Paging/Directory    ______________________________________________________________________    Interval History   Feeling well following angiogram. No concern for chest pain, dyspnea, cough, fever, chills, abdominal pain, nausea, vomiting. No night sweats. Comments on weight loss ~ 30lb over the past several months. Notes she is typically 160lb, but is down to 130lbs as of recent. No intentional weight loss. Has yet to see nutrionist in clinic as recommended by PCP. No other concerns at this time. Currently feeling well.     Physical Exam   Vital Signs: Temp: 98  F (36.7  C) Temp src: Oral BP: 114/70 Pulse: 59   Resp: 16 SpO2: 100 % O2 Device: None (Room air) Oxygen Delivery: 2 LPM  Weight: 130 lbs 0 oz    General: laying in bed, alert, cooperative, awake, in no acute distress  HEENT: normocephalic, atraumatic, anicteric sclera  Respiratory: breathing comfortably on room air, clear to auscultation bilaterally without wheezing, crackles, or rhonchi appreciated   Cardiac: regular rate and rhythm with normal S1/S2 without murmurs, clicks, rubs or gallops, 2+ radial pulse on LUE, no signs of peripheral edema bilaterally; compression bandage on RUE over radial artery   GI: soft, non-distended, normoactive bowel sounds, non-tender per palpation  Neuro: grossly non-focal, alert and oriented, normal speech  MSK: no bony deformities, moving all extremities independently  Skin: no rashes or lesions on uncovered surfaces, no jaundice       Medical Decision Making       50 MINUTES SPENT BY ME on the date of service doing chart review, history, exam, documentation & further activities per the note.      Data   NOTE: Data reviewed over the past 24 hrs contributes toward MDM complexity

## 2025-02-20 NOTE — ED PROVIDER NOTES
EMERGENCY DEPARTMENT ENCOUNTER      NAME: Bharath Forbes  AGE: 54 year old female  YOB: 1970  MRN: [id number]  EVALUATION DATE & TIME: No admission date for patient encounter.    PCP: Samuel King    ED PROVIDER: Miguel Key PA-C      Chief Complaint   Patient presents with    Chest Pain    Nausea & Vomiting         FINAL IMPRESSION:  1. NSTEMI (non-ST elevated myocardial infarction) (H)    2. Nausea and vomiting    3. Hypokalemia    4. Chest pain          ED COURSE & MEDICAL DECISION MAKING:    Pertinent Labs & Imaging studies reviewed. (See chart for details)  8:39 PM I met the patient and performed my initial interview and exam.   9:28 PM Staffed with Dr. Denton    9:41 PM Spoke with Dr. Baez Cardiology who recommends heparin drip, admission, suspects likely NSTEMI  10:58 PM Patient seen and reevalauted, pain improved.   11:36 PM Discussed case with Dr. Garcia, Hospitalist, who agrees for admission         54 year old female presents to the Emergency Department for evaluation of anterior chest pain, nausea.     ED Course as of 02/19/25 2339 Wed Feb 19, 2025 2042 Patient is a 54-year-old female, resents emergency department for evaluation of sudden onset of chest pain, nausea and vomiting that started about an hour ago.  Reports grandchild at home sick with fever.  On arrival here, pain primarily in the upper abdomen, she did have 5 or 6 episodes of vomiting prior to arrival.  No changes in bowel or bladder.  On arrival here, not tachycardic, febrile or tachypneic.  Lung sounds are clear.  Does not have any reproducible abdominal pain, rebound or guarding.  Does have a history of hypertension in the past.  No changes in medications.  Has never had symptoms like this previously.  Broad differential considered here in the emergency department including electrolyte abnormality, less likely PE, cholecystitis, cholelithiasis, pancreatitis, viral etiology, influenza COVID RSV,  atypical ACS.  Labs will be obtained, additional imaging pending D-dimer.   2119 Mild elevation in lipase here, possibly reactive secondary to vomiting.  LFTs are normal.  Mildly hypokalemic, likely secondary to vomiting.   2123 Troponin T, High Sensitivity(!!): 108   2203     Repeat EKG here in the emergency department does not show significant abnormalities or changes.  Patient to be heparinized here in the emergency department. I did discuss case with cardiology who recommends heparinization, likely NSTEMI here.  Patient was given nitroglycerin here, additional dose of morphine ordered as needed as pain has not improved after initial dose of oxycodone.  Plan for delta troponin, pending imaging patient will be admitted.   2227 CT Chest Pulmonary Embolism w Contrast  CT PE study here shows no acute findings in the chest, no acute pulmonary embolism, mild urinary bladder wall thickening.  No acute findings in the abdomen or pelvis.  Incidentally noted right upper lobe nodules measuring 0.3 cm, recommend outpatient CT in 12 months.   2317     Patient seen and reevaluated here, pain is now improved after aspirin and morphine yet.  Pending initiation of heparin drip here, as well as admission.       2338 Spoke with hospital medicine, patient will be admitted here for NSTEMI,    Will be initiated on heparin drip.  Cardiology aware.       Medical Decision Making  Obtained supplemental history:Supplemental history obtained?: No  Reviewed external records: External records reviewed?: Documented in chart and Outpatient Record: [facility] on 2/19/25  Care impacted by chronic illness:Hypertension and Other: prediabetes  Did you consider but not order tests?: Work up considered but not performed and documented in chart, if applicable  Did you interpret images independently?: Independent interpretation of ECG and images noted in documentation, when applicable.  Consultation discussion with other  provider:Did you involve another provider (consultant, , pharmacy, etc.)?: I discussed the care with another health care provider, see documentation for details.  Admit.    MIPS (CTPE, Dental pain, Todd, Sinusitis, Asthma/COPD, Head Trauma): CT Pulmonary Angiogram:The patient had an abnormal d-dimer.      None    At the conclusion of the encounter I discussed the results of all of the tests and the disposition. The questions were answered. The patient or family acknowledged understanding and was agreeable with the care plan.     Critical care 35 minutes excluding separately billable procedures.  Includes bedside management, time reviewing test results, review of records, discussing thecase with staff, documenting the medical record and time spent with family members (or surrogate decision makers) discussing specific treatment issues.      MEDICATIONS GIVEN IN THE EMERGENCY:  Medications   heparin loading dose for LOW INTENSITY TREATMENT * Give BEFORE starting heparin infusion (has no administration in time range)   heparin 25,000 units in 0.45% NaCl 250 mL ANTICOAGULANT infusion (has no administration in time range)   nitroGLYcerin (NITROSTAT) sublingual tablet 0.4 mg (has no administration in time range)   ondansetron (ZOFRAN) injection 4 mg (4 mg Intravenous $Given 2/19/25 2049)   oxyCODONE (ROXICODONE) tablet 5 mg (5 mg Oral $Given 2/19/25 2049)   potassium chloride ethan ER (KLOR-CON M20) CR tablet 40 mEq (40 mEq Oral $Given 2/19/25 2157)   iopamidol (ISOVUE-370) solution 90 mL (75 mLs Intravenous $Given 2/19/25 2143)   aspirin (ASA) tablet 325 mg (325 mg Oral $Given 2/19/25 2157)   morphine (PF) injection 4 mg (4 mg Intravenous $Given 2/19/25 2319)       NEW PRESCRIPTIONS STARTED AT TODAY'S ER VISIT  New Prescriptions    No medications on file     =================================================================    HPI    Patient information was obtained from: patient    Use of : N/A          Bharath Forbes is a 54 year old female with a pertinent history of prediabetes, HTN who presents to this ED for evaluation of chest pain and vomiting.    Patient endorses substernal chest pain about an hour ago that does not radiate to other areas. The pain is an 8/10. She reports she has never had similar symptoms before in the past. Associated nausea and vomiting with this, but denies any nausea currently. She reports more than 5-6 episodes of vomiting. Granddaughter currently has a fever at home. Denies any cough or abdominal pain. No other complaints or concerns at this time.     PAST MEDICAL HISTORY:  Past Medical History:   Diagnosis Date    ASCUS with positive high risk HPV cervical 3/16/2021    Positive HPV for other high risk types and pap showed ASCUS. Prior pap with cotesting in 2015 was normal. Per 2019 ASCCP guidelines 5 year risk of CIN3+ 3.6% so requires 1 year follow up for repeat cotesting.   Charles Francis MD    HTN (hypertension)     Hypertension        PAST SURGICAL HISTORY:  Past Surgical History:   Procedure Laterality Date    NO HISTORY OF SURGERY      [facility] COLONOSCOPY W/WO BRUSH/WASH N/A 2/6/2020    Procedure: COLONOSCOPY;  Surgeon: Hector Renae III, MD;  Location: [facility];  Service: Gastroenterology     CURRENT MEDICATIONS:    benazepril (LOTENSIN) 20 MG tablet  benazepril (LOTENSIN) 40 MG tablet  citalopram (CELEXA) 10 MG tablet  omeprazole (PRILOSEC) 20 MG DR capsule  vitamin D3 (CHOLECALCIFEROL) 10 MCG (400 UNIT) capsule       ALLERGIES:  Allergies   Allergen Reactions    Nka [No Known Allergies]        FAMILY HISTORY:  Family History   Problem Relation Age of Onset    Hypertension Mother     Diabetes No family hx of     Coronary Artery Disease No family hx of     Breast Cancer No family hx of     Cancer - colorectal No family hx of     Ovarian Cancer No family hx of     Prostate Cancer No family hx of     Other Cancer No family hx of     Cerebrovascular Disease No family hx  of     Thyroid Disease No family hx of     Asthma No family hx of        SOCIAL HISTORY:   Social History     Socioeconomic History    Marital status: Single    Number of children: 3   Occupational History    Occupation:    Tobacco Use    Smoking status: Never    Smokeless tobacco: Never   Substance and Sexual Activity    Alcohol use: Yes     Alcohol/week: 0.0 standard drinks of alcohol     Comment: Ocassionally    Drug use: Yes     Types: Marijuana    Sexual activity: Yes     Partners: Male     Birth control/protection: Condom     Social Drivers of Health     Financial Resource Strain: Low Risk  (12/1/2023)    Financial Resource Strain     Within the past 12 months, have you or your family members you live with been unable to get utilities (heat, electricity) when it was really needed?: No   Food Insecurity: Low Risk  (12/1/2023)    Food Insecurity     Within the past 12 months, did you worry that your food would run out before you got money to buy more?: No     Within the past 12 months, did the food you bought just not last and you didn t have money to get more?: No   Transportation Needs: Low Risk  (12/1/2023)    Transportation Needs     Within the past 12 months, has lack of transportation kept you from medical appointments, getting your medicines, non-medical meetings or appointments, work, or from getting things that you need?: No   Recent Concern: Transportation Needs - High Risk (10/30/2023)    Transportation Needs     Within the past 12 months, has lack of transportation kept you from medical appointments, getting your medicines, non-medical meetings or appointments, work, or from getting things that you need?: Yes   Interpersonal Safety: Low Risk  (10/30/2023)    Interpersonal Safety     Do you feel physically and emotionally safe where you currently live?: Yes     Within the past 12 months, have you been hit, slapped, kicked or otherwise physically hurt by someone?: No     Within the past 12  "months, have you been humiliated or emotionally abused in other ways by your partner or ex-partner?: No   Housing Stability: Low Risk  (12/1/2023)    Housing Stability     Do you have housing? : Yes     Are you worried about losing your housing?: No       VITALS:  /85   Pulse 84   Temp 97.6  F (36.4  C) (Oral)   Resp 14   Ht 1.676 m (5' 6\")   Wt 59 kg (130 lb)   SpO2 100%   BMI 20.98 kg/m      PHYSICAL EXAM    Physical Exam  Vitals and nursing note reviewed.   Constitutional:       General: She is not in acute distress.     Appearance: Normal appearance. She is normal weight. She is not toxic-appearing or diaphoretic.   HENT:      Right Ear: External ear normal.      Left Ear: External ear normal.   Eyes:      Conjunctiva/sclera: Conjunctivae normal.   Cardiovascular:      Rate and Rhythm: Normal rate and regular rhythm. No extrasystoles are present.     Heart sounds: Normal heart sounds. Heart sounds not distant. No murmur heard.     No systolic murmur is present.   Pulmonary:      Effort: Pulmonary effort is normal. No tachypnea, accessory muscle usage or respiratory distress.      Breath sounds: Normal breath sounds. No wheezing, rhonchi or rales.   Abdominal:      Palpations: There is no mass.      Tenderness: There is no abdominal tenderness. There is no rebound.   Musculoskeletal:      Right lower leg: No edema.      Left lower leg: No edema.   Skin:     Findings: No erythema or rash.   Neurological:      General: No focal deficit present.      Mental Status: She is alert. Mental status is at baseline.           LAB:  All pertinent labs reviewed and interpreted.  Labs Ordered and Resulted from Time of ED Arrival to Time of ED Departure   CBC WITH PLATELETS - Abnormal       Result Value    WBC Count 9.0      RBC Count 4.61      Hemoglobin 12.6      Hematocrit 38.2      MCV 83      MCH 27.3      MCHC 33.0      RDW 16.5 (*)     Platelet Count 297     BASIC METABOLIC PANEL - Abnormal    Sodium 139   "    Potassium 3.1 (*)     Chloride 99      Carbon Dioxide (CO2) 28      Anion Gap 12      Urea Nitrogen 9.7      Creatinine 0.74      GFR Estimate >90      Calcium 9.0      Glucose 133 (*)    TROPONIN T, HIGH SENSITIVITY - Abnormal    Troponin T, High Sensitivity 108 (*)    LIPASE - Abnormal    Lipase 90 (*)    D DIMER QUANTITATIVE - Abnormal    D-Dimer Quantitative 1.04 (*)    CBC WITH PLATELETS - Abnormal    WBC Count 11.6 (*)     RBC Count 4.40      Hemoglobin 12.2      Hematocrit 36.5      MCV 83      MCH 27.7      MCHC 33.4      RDW 16.4 (*)     Platelet Count 272     ROUTINE UA WITH MICROSCOPIC REFLEX TO CULTURE - Abnormal    Color Urine Colorless      Appearance Urine Clear      Glucose Urine Negative      Bilirubin Urine Negative      Ketones Urine Negative      Specific Gravity Urine <1.005 (*)     Blood Urine 0.06 mg/dL (*)     pH Urine 8.0 (*)     Protein Albumin Urine Negative      Urobilinogen Urine <2.0      Nitrite Urine Negative      Leukocyte Esterase Urine 25 Mitchell/uL (*)     RBC Urine 13 (*)     WBC Urine 6 (*)     Squamous Epithelials Urine <1     HEPATIC FUNCTION PANEL - Normal    Protein Total 7.6      Albumin 4.6      Bilirubin Total 0.4      Alkaline Phosphatase 119      AST 37      ALT 31      Bilirubin Direct 0.20     INFLUENZA A/B, RSV AND SARS-COV2 PCR - Normal    Influenza A PCR Negative      Influenza B PCR Negative      RSV PCR Negative      SARS CoV2 PCR Negative     TROPONIN T, HIGH SENSITIVITY   HEPARIN UNFRACTIONATED ANTI XA LEVEL       RADIOLOGY:  Reviewed all pertinent imaging. Please see official radiology report.  CT Abdomen Pelvis w Contrast   Final Result   IMPRESSION:    1.  No acute findings in the chest. No acute pulmonary embolism.      2.  Incidental right upper lobe nodules measuring 0.3 cm. If no history of malignancy, consider follow-up per Fleischner recommendations: For low-risk patients, no routine follow-up required. For high-risk patients, optional CT at 12 months.       3.  Mild urinary bladder wall thickening, possibly due to underdistention. However, correlate with urinalysis to assess for cystitis.      4.  Otherwise, no acute findings in the abdomen or pelvis.         [Consider Follow Up: Incidental right upper lobe nodules]      This report will be copied to the [facility] to ensure a provider acknowledges the finding.          CT Chest Pulmonary Embolism w Contrast   Final Result   IMPRESSION:    1.  No acute findings in the chest. No acute pulmonary embolism.      2.  Incidental right upper lobe nodules measuring 0.3 cm. If no history of malignancy, consider follow-up per Fleischner recommendations: For low-risk patients, no routine follow-up required. For high-risk patients, optional CT at 12 months.      3.  Mild urinary bladder wall thickening, possibly due to underdistention. However, correlate with urinalysis to assess for cystitis.      4.  Otherwise, no acute findings in the abdomen or pelvis.         [Consider Follow Up: Incidental right upper lobe nodules]      This report will be copied to the [facility] to ensure a provider acknowledges the finding.              EKG:    Performed at: 2039    Impression: Normal Sinus Rhythm     Rate: 92  Rhythm: Sinus   Axis: 85 52 98  RI Interval: 202  QRS Interval: 86  QTc Interval: 484  ST Changes: No ST elevation or depression  Comparison: When compared to previous from 11/02/2024 no significant change    I have reviewed and interpreted the EKG(s) documented above along with Dr. Filiberto ED MD.      Repeat EKG performed at 2159  Impression: Sinus Rhythm with occasional PVCs    QRS 80    RI axes 88 53 99     I have reviewed and interpreted the EKG(s) documented above along with Dr. Corrie ED MD.         PROCEDURES:   None.       I, Kimberlyn Still, am serving as a scribe to document services personally performed by Miguel Kye PA-C, based on my observation and the provider's  statements to me. I, Miguel Key PA-C, attest that Kimberlyn Still is acting in a scribe capacity, has observed my performance of the services and has documented them in accordance with my direction.    Miguel Key PA-C  Emergency Medicine  [facility] EMERGENCY ROOM  [address] [facility] [address]  [phone]  Dept: [phone]       Miguel Key PA-C  02/20/25 0004

## 2025-02-20 NOTE — PLAN OF CARE
Problem: Adult Inpatient Plan of Care  Goal: Optimal Comfort and Wellbeing  Outcome: Progressing     Problem: Chest Pain  Goal: Resolution of Chest Pain Symptoms  Outcome: Progressing  Intervention: Manage Acute Chest Pain  Recent Flowsheet Documentation  Taken 2/20/2025 0925 by Asia Connor RN  Chest Pain Intervention: nitroglycerin SL given     Goal Outcome Evaluation:  Pt is A&O, calls appropriately for needs and is independent for ambulation. Vitals signs are stable, afebrile, oxygen is on room air. Pt complains of pain 5/10, to chest, PRN Nitro given for this, with relief. Pt is NPO diet. Pt is voiding spontaneously, last bowel movement 2/19/2025. IV is running fluids 75ml/hr. IV Heparin running at 750ml/hr    Plan:  Patient transferred to [place].    Asia Connor RN  February 20, 2025, 12:53 PM

## 2025-02-20 NOTE — INTERVAL H&P NOTE
I have reviewed the surgical (or preoperative) H&P that is linked to this encounter, and examined the patient. There are no significant changes    Clinical Conditions Present on Arrival:  Clinically Significant Risk Factors Present on Admission        # Hypokalemia: Lowest K = 3.1 mmol/L in last 2 days, will replace as needed

## 2025-02-20 NOTE — ED PROVIDER NOTES
"Emergency Department Midlevel Supervisory Note     I had a face to face encounter with this patient seen by the Advanced Practice Provider ([facility]). I personally made/approved the management plan and take responsibility for the patient management. I personally saw patient and performed a substantive portion of the visit including all aspects of the medical decision making.     ED Course:  2130 Roberto Key PA-C staffed patient with me. I agree with their assessment and plan of management, and I will see the patient.  2135 I met with the patient to introduce myself, gather additional history, perform my initial exam, and discuss the plan.     Brief HPI:     Bharath Forbes is a 54 year old female who presents for evaluation of chest pain, nausea and vomiting that began about 1 hour prior to her presentation to the emergency department        Brief Physical Exam: /85   Pulse 84   Temp 97.6  F (36.4  C) (Oral)   Resp 14   Ht 1.676 m (5' 6\")   Wt 59 kg (130 lb)   SpO2 100%   BMI 20.98 kg/m    Constitutional:  Alert, in no acute distress  EYES: Conjunctivae clear  HENT:  Atraumatic  Respiratory:  Respirations even, unlabored, in no acute respiratory distress  Cardiovascular:  Regular rate and rhythm, good peripheral perfusion  GI: Soft, non-distended, non-tender  Musculoskeletal:  Moves all 4 extremities equally, grossly symmetrical strength  Integument: Warm & dry. No appreciable rash, erythema.  Neurologic:  Alert & oriented, speech clear and fluent, no focal deficits noted  Psych: Normal mood and affect       MDM:  In summary, the patient is a 54-year-old female that presents to the emergency department for evaluation of chest pain concerning for ACS with an elevated troponin.  We will admit to the hospital for further care and evaluation.  ED Course as of 02/19/25 2345 Wed Feb 19, 2025 2042 Patient is a 54-year-old female, resents emergency department for evaluation of sudden onset of chest " pain, nausea and vomiting that started about an hour ago.  Reports grandchild at home sick with fever.  On arrival here, pain primarily in the upper abdomen, she did have 5 or 6 episodes of vomiting prior to arrival.  No changes in bowel or bladder.  On arrival here, not tachycardic, febrile or tachypneic.  Lung sounds are clear.  Does not have any reproducible abdominal pain, rebound or guarding.  Does have a history of hypertension in the past.  No changes in medications.  Has never had symptoms like this previously.  Broad differential considered here in the emergency department including electrolyte abnormality, less likely PE, cholecystitis, cholelithiasis, pancreatitis, viral etiology, influenza COVID RSV, atypical ACS.  Labs will be obtained, additional imaging pending D-dimer.   2119 Mild elevation in lipase here, possibly reactive secondary to vomiting.  LFTs are normal.  Mildly hypokalemic, likely secondary to vomiting.   2123 Troponin T, High Sensitivity(!!): 108   2203     Repeat EKG here in the emergency department does not show significant abnormalities or changes.  Patient to be heparinized here in the emergency department. I did discuss case with cardiology who recommends heparinization, likely NSTEMI here.  Patient was given nitroglycerin here, additional dose of morphine ordered as needed as pain has not improved after initial dose of oxycodone.  Plan for delta troponin, pending imaging patient will be admitted.   2227 CT Chest Pulmonary Embolism w Contrast  CT PE study here shows no acute findings in the chest, no acute pulmonary embolism, mild urinary bladder wall thickening.  No acute findings in the abdomen or pelvis.  Incidentally noted right upper lobe nodules measuring 0.3 cm, recommend outpatient CT in 12 months.   2317     Patient seen and reevaluated here, pain is now improved after aspirin and morphine yet.  Pending initiation of heparin drip here, as well as admission.       2338 Spoke  with hospital medicine, patient will be admitted here for NSTEMI,    Will be initiated on heparin drip.  Cardiology aware.       1. NSTEMI (non-ST elevated myocardial infarction) (H)    2. Nausea and vomiting    3. Hypokalemia    4. Chest pain        Consults:  I discussed the patient with cardiology who recommends admission and initiation of heparin therapy.    Labs and Imaging:  Results for orders placed or performed during the hospital encounter of 02/19/25   CT Chest Pulmonary Embolism w Contrast   Result Value Ref Range    Radiologist flags Incidental right upper lobe nodules     Impression    IMPRESSION:   1.  No acute findings in the chest. No acute pulmonary embolism.    2.  Incidental right upper lobe nodules measuring 0.3 cm. If no history of malignancy, consider follow-up per Fleischner recommendations: For low-risk patients, no routine follow-up required. For high-risk patients, optional CT at 12 months.    3.  Mild urinary bladder wall thickening, possibly due to underdistention. However, correlate with urinalysis to assess for cystitis.    4.  Otherwise, no acute findings in the abdomen or pelvis.      [Consider Follow Up: Incidental right upper lobe nodules]    This report will be copied to the [facility] to ensure a provider acknowledges the finding.      CT Abdomen Pelvis w Contrast   Result Value Ref Range    Radiologist flags Incidental right upper lobe nodules     Impression    IMPRESSION:   1.  No acute findings in the chest. No acute pulmonary embolism.    2.  Incidental right upper lobe nodules measuring 0.3 cm. If no history of malignancy, consider follow-up per Fleischner recommendations: For low-risk patients, no routine follow-up required. For high-risk patients, optional CT at 12 months.    3.  Mild urinary bladder wall thickening, possibly due to underdistention. However, correlate with urinalysis to assess for cystitis.    4.  Otherwise, no acute findings in the abdomen or  pelvis.      [Consider Follow Up: Incidental right upper lobe nodules]    This report will be copied to the [facility] to ensure a provider acknowledges the finding.      CBC with platelets   Result Value Ref Range    WBC Count 9.0 4.0 - 11.0 10e3/uL    RBC Count 4.61 3.80 - 5.20 10e6/uL    Hemoglobin 12.6 11.7 - 15.7 g/dL    Hematocrit 38.2 35.0 - 47.0 %    MCV 83 78 - 100 fL    MCH 27.3 26.5 - 33.0 pg    MCHC 33.0 31.5 - 36.5 g/dL    RDW 16.5 (H) 10.0 - 15.0 %    Platelet Count 297 150 - 450 10e3/uL   Basic metabolic panel   Result Value Ref Range    Sodium 139 135 - 145 mmol/L    Potassium 3.1 (L) 3.4 - 5.3 mmol/L    Chloride 99 98 - 107 mmol/L    Carbon Dioxide (CO2) 28 22 - 29 mmol/L    Anion Gap 12 7 - 15 mmol/L    Urea Nitrogen 9.7 6.0 - 20.0 mg/dL    Creatinine 0.74 0.51 - 0.95 mg/dL    GFR Estimate >90 >60 mL/min/1.73m2    Calcium 9.0 8.8 - 10.4 mg/dL    Glucose 133 (H) 70 - 99 mg/dL   Result Value Ref Range    Troponin T, High Sensitivity 108 (HH) <=14 ng/L   Hepatic function panel   Result Value Ref Range    Protein Total 7.6 6.4 - 8.3 g/dL    Albumin 4.6 3.5 - 5.2 g/dL    Bilirubin Total 0.4 <=1.2 mg/dL    Alkaline Phosphatase 119 40 - 150 U/L    AST 37 0 - 45 U/L    ALT 31 0 - 50 U/L    Bilirubin Direct 0.20 0.00 - 0.30 mg/dL   Result Value Ref Range    Lipase 90 (H) 13 - 60 U/L   Influenza A/B, RSV and SARS-CoV2 PCR (COVID-19) Nasopharyngeal    Specimen: Nasopharyngeal; Swab   Result Value Ref Range    Influenza A PCR Negative Negative    Influenza B PCR Negative Negative    RSV PCR Negative Negative    SARS CoV2 PCR Negative Negative   D dimer quantitative   Result Value Ref Range    D-Dimer Quantitative 1.04 (H) 0.00 - 0.50 ug/mL FEU   Extra Red Top Tube   Result Value Ref Range    Hold Specimen JIC    CBC with platelets   Result Value Ref Range    WBC Count 11.6 (H) 4.0 - 11.0 10e3/uL    RBC Count 4.40 3.80 - 5.20 10e6/uL    Hemoglobin 12.2 11.7 - 15.7 g/dL    Hematocrit 36.5 35.0 - 47.0  %    MCV 83 78 - 100 fL    MCH 27.7 26.5 - 33.0 pg    MCHC 33.4 31.5 - 36.5 g/dL    RDW 16.4 (H) 10.0 - 15.0 %    Platelet Count 272 150 - 450 10e3/uL   UA with Microscopic reflex to Culture    Specimen: Urine, Midstream   Result Value Ref Range    Color Urine Colorless Colorless, Straw, Light Yellow, Yellow    Appearance Urine Clear Clear    Glucose Urine Negative Negative mg/dL    Bilirubin Urine Negative Negative    Ketones Urine Negative Negative mg/dL    Specific Gravity Urine <1.005 (L) 1.005 - 1.030    Blood Urine 0.06 mg/dL (A) Negative    pH Urine 8.0 (H) 5.0 - 7.0    Protein Albumin Urine Negative Negative mg/dL    Urobilinogen Urine <2.0 <2.0 mg/dL    Nitrite Urine Negative Negative    Leukocyte Esterase Urine 25 Mitchell/uL (A) Negative    RBC Urine 13 (H) <=2 /HPF    WBC Urine 6 (H) <=5 /HPF    Squamous Epithelials Urine <1 <=1 /HPF   ECG 12-LEAD WITH MUSE (LHE)   Result Value Ref Range    Systolic Blood Pressure  mmHg    Diastolic Blood Pressure  mmHg    Ventricular Rate 92 BPM    Atrial Rate 92 BPM    MS Interval 202 ms    QRS Duration 86 ms     ms    QTc 484 ms    P Axis 85 degrees    R AXIS 52 degrees    T Axis 98 degrees    Interpretation ECG       Sinus rhythm  Prolonged QT  Abnormal ECG  When compared with ECG of 02-Nov-2024 19:15,  Premature ventricular complexes are no longer Present  Nonspecific T wave abnormality, improved in Anterior leads  Nonspecific T wave abnormality now evident in Lateral leads  Confirmed by SEE ED PROVIDER NOTE FOR, ECG INTERPRETATION (4000),  ALIA FORD ([id number]) on 2/19/2025 8:43:52 PM     ECG 12-LEAD WITH MUSE (LHE)   Result Value Ref Range    Systolic Blood Pressure  mmHg    Diastolic Blood Pressure  mmHg    Ventricular Rate 94 BPM    Atrial Rate 94 BPM    MS Interval 200 ms    QRS Duration 80 ms     ms    QTc 500 ms    P Axis 88 degrees    R AXIS 53 degrees    T Axis 99 degrees    Interpretation ECG       Sinus rhythm with occasional Premature  ventricular complexes  T wave abnormality, consider lateral ischemia  Prolonged QT  Abnormal ECG  When compared with ECG of 19-Feb-2025 20:39,  Premature ventricular complexes are now Present  Confirmed by SEE ED PROVIDER NOTE FOR, ECG INTERPRETATION (4000),  ALIA FORD ([id number]) on 2/19/2025 10:01:54 PM         I have reviewed the relevant laboratory studies above.    I independently interpreted the following imaging study(s):   CTA PE reveals no pulmonary emboli    EKG: I reviewed and independently interpreted the patient's EKG, with comments made as listed below. Please see scanned EKG for full report.   2039-rate is 92, sinus, radiology urology and urology no ST segment elevation or depression is appreciated.  Nonspecific T wave abnormalities noted and anterior and lateral leads.  EKG appears unchanged from EKG in November 2024    Procedures:  I was present for the key portions of procedures documented in [facility]/midlevel note, see midlevel note for further details.    Yoselin Denton MD  [facility] EMERGENCY ROOM  [address] [address] [phone]       Yoselin Denton MD  02/19/25 2305       Yoselin Denton MD  02/19/25 [id number]

## 2025-02-20 NOTE — H&P
[facility] MEDICINE ADMISSION HISTORY AND PHYSICAL       Assessment & Plan      This is a 54 year old AAF with NSTEMI       Present on Admission (POA)    1. NSTEMI    - NPO, anti emetics, nitroglycerin  - Of note CT ---- CORONARY ARTERY CALCIFICATION: None.   - Continue heparin infusion  - ECHO in AM - WMA check  - Cardiology cons - LHC  - tele and pulse ox  - Counseled on Marijuana use     2. CT evidence of  right upper lobe nodules measuring 0.3 cm    - Per note -- If no history of malignancy, consider follow-up per Fleischner recommendations: For low-risk patients, no routine follow-up required. For high-risk patients, optional CT at 12 months. --- Defer to AM doc to include in discharge summary     - Patient was made aware too , she denies smoking    3. Vomiting - could be viral   - anti emetics       4. UA suspicious for UTI, possible cystitis on CT  - Check CBC/procal  - She denies UTI symptoms, not sexually active     5, Mild lipase elev - no ETOH use  - check TG, repeat lipase in AM       Chronic Medical Conditions    1. HTN  2. [place]       Clinically Significant Risk Factors Present on Admission        # Hypokalemia: Lowest K = 3.1 mmol/L in last 2 days, will replace as needed            # Hypertension: Noted on problem list                          VTE prophylaxis --  SCDs  Diet --  NPO   Code Status -- Full,  Barriers to discharge -- Admitting/Acute medical condition/s  Discharge Disposition and goals --  Unable to determine at this point, pending progress/treatment response.     PPE - I was wearing PPE including but not limited to - N95 mask, Gloves, and/or Safety glasses.  Admission Status --  Inpatient     Care plan is based on available information and patient's condition at encounter. Care plan was discussed and agreed to proceed by the patient/family member. Made aware that care plan may change.     I recommend to review/revise history/care plan for information/results not  "available during my encounter. All or some home medication/s were not resumed or was modified on admission due to safety concerns. Will have rounding AM doc  review safety to resume held/modified home medications.     Availability -- If need to coordinate care after night shift  -- Contact assigned AM rounding Hospitalist.     75 minutes spent by me on the date of service doing chart review, history, exam, diagnostic test results interpretation, care coordination with RN, RT and/or Pharm, & further activities per the note.      Sukhdeep Garcia MD, MPH, FACP, [facility]  Internal Medicine - Hospitalist        Chief Complaint Chest pain       HISTORY     - Patient was seen in ED - 15   - She works as a  , physically active and was at work - and she started vomiting \"10x\". No blood in vomitus. She ate donuts earlier. Then later developed mid chest pain and some mild SOB. She was given Ibuprofen by co-worker with no help - she decided to come to ED.    - No chest symptoms at this point. No SOB. No belly pain. Admits to using Marijuana. But no cocaine or met.   - No cough or colds. No fevers     - ED course/treatments/referral - Trop 108, EKG showed non specific STT wave changes.  Cardiology was consulted.      - ROS --- No headache. No dizziness. No weakness. No palpitations. No abdominal pain. No urinary symptoms. No bleeding symptoms. No weight loss. Rest of 12 point ROS was reviewed and negative.       Past Medical History     Past Medical History:   Diagnosis Date    ASCUS with positive high risk HPV cervical 3/16/2021    Positive HPV for other high risk types and pap showed ASCUS. Prior pap with cotesting in 2015 was normal. Per 2019 ASCCP guidelines 5 year risk of CIN3+ 3.6% so requires 1 year follow up for repeat cotesting.   Charles Francis MD    HTN (hypertension)     Hypertension          Surgical History     Past Surgical History:   Procedure Laterality Date    NO HISTORY OF SURGERY      ZZHC COLONOSCOPY W/WO " BRUSH/WASH N/A 2/6/2020    Procedure: COLONOSCOPY;  Surgeon: Hector Renae III, MD;  Location: [facility];  Service: Gastroenterology        Family History      Family History   Problem Relation Age of Onset    Hypertension Mother     Diabetes No family hx of     Coronary Artery Disease No family hx of     Breast Cancer No family hx of     Cancer - colorectal No family hx of     Ovarian Cancer No family hx of     Prostate Cancer No family hx of     Other Cancer No family hx of     Cerebrovascular Disease No family hx of     Thyroid Disease No family hx of     Asthma No family hx of          Social History      .  Social History     Socioeconomic History    Marital status: Single     Spouse name: Not on file    Number of children: 3    Years of education: Not on file    Highest education level: Not on file   Occupational History    Occupation:    Tobacco Use    Smoking status: Never    Smokeless tobacco: Never   Substance and Sexual Activity    Alcohol use: Yes     Alcohol/week: 0.0 standard drinks of alcohol     Comment: Ocassionally    Drug use: Yes     Types: Marijuana    Sexual activity: Yes     Partners: Male     Birth control/protection: Condom   Other Topics Concern    Parent/sibling w/ CABG, MI or angioplasty before 65F 55M? Not Asked   Social History Narrative    Not on file     Social Drivers of Health     Financial Resource Strain: Low Risk  (12/1/2023)    Financial Resource Strain     Within the past 12 months, have you or your family members you live with been unable to get utilities (heat, electricity) when it was really needed?: No   Food Insecurity: Low Risk  (12/1/2023)    Food Insecurity     Within the past 12 months, did you worry that your food would run out before you got money to buy more?: No     Within the past 12 months, did the food you bought just not last and you didn t have money to get more?: No   Transportation Needs: Low Risk  (12/1/2023)    Transportation Needs      Within the past 12 months, has lack of transportation kept you from medical appointments, getting your medicines, non-medical meetings or appointments, work, or from getting things that you need?: No   Recent Concern: Transportation Needs - High Risk (10/30/2023)    Transportation Needs     Within the past 12 months, has lack of transportation kept you from medical appointments, getting your medicines, non-medical meetings or appointments, work, or from getting things that you need?: Yes   Physical Activity: Not on file   Stress: Not on file   Social Connections: Not on file   Interpersonal Safety: Low Risk  (10/30/2023)    Interpersonal Safety     Do you feel physically and emotionally safe where you currently live?: Yes     Within the past 12 months, have you been hit, slapped, kicked or otherwise physically hurt by someone?: No     Within the past 12 months, have you been humiliated or emotionally abused in other ways by your partner or ex-partner?: No   Housing Stability: Low Risk  (12/1/2023)    Housing Stability     Do you have housing? : Yes     Are you worried about losing your housing?: No          Allergies        Allergies   Allergen Reactions    Nka [No Known Allergies]          Prior to Admission Medications      Current Facility-Administered Medications   Medication Dose Route Frequency Provider Last Rate Last Admin    heparin 25,000 units in 0.45% NaCl 250 mL ANTICOAGULANT infusion  0-5,000 Units/hr Intravenous Continuous Yoselin Denton MD        heparin loading dose for LOW INTENSITY TREATMENT * Give BEFORE starting heparin infusion  60 Units/kg Intravenous Once Yoselin Denton MD        nitroGLYcerin (NITROSTAT) sublingual tablet 0.4 mg  0.4 mg Sublingual Q5 Min PRN Miguel Key PA-C         Current Outpatient Medications   Medication Sig Dispense Refill    benazepril (LOTENSIN) 20 MG tablet Take 1 tablet (20 mg) by mouth daily 30 tablet 0    benazepril (LOTENSIN) 40 MG  "tablet Take 1 tablet (40 mg) by mouth daily for 14 days. 14 tablet 0    citalopram (CELEXA) 10 MG tablet Take 1 tablet (10 mg) by mouth daily 30 tablet 1    omeprazole (PRILOSEC) 20 MG DR capsule Take 1 capsule (20 mg) by mouth daily      vitamin D3 (CHOLECALCIFEROL) 10 MCG (400 UNIT) capsule Take 2 capsules (800 Units) by mouth daily 180 capsule 3           Review of Systems     A 12 point comprehensive review of systems was negative except as noted above in HPI.    PHYSICAL EXAMINATION       Vitals      Vitals: /85   Pulse 84   Temp 97.6  F (36.4  C) (Oral)   Resp 14   Ht 1.676 m (5' 6\")   Wt 59 kg (130 lb)   SpO2 100%   BMI 20.98 kg/m    BMI= Body mass index is 20.98 kg/m .      Examination     General Appearance:  Alert, cooperative, no distress  Head:    Normocephalic, without obvious abnormality, atraumatic  EENT:  PERRL, conjunctiva/corneas clear, EOM's intact.   Neck:   Supple, symmetrical, trachea midline, no adenopathy; no NVE  Back:  Symmetric, no curvature, no CVA tenderness  Chest/Lungs: Clear to auscultation bilaterally, respirations unlabored, No tenderness or deformity. No abdominal breathing or use of accessory muscles.   Heart:    Regular rate and rhythm, S1 and S2 normal, no murmur, rub   or gallop  Abdomen: Soft, non-tender, bowel sounds active all four quadrants, not peritoneal on palpation. Not distended  Extremities:  Extremities normal, atraumatic, no swelling   Skin:  Skin color, texture, turgor normal, no rashes or lesion  Neurologic:  Awake and alert, No lateralizing or localizing signs        Pertinent Lab     Results for orders placed or performed during the hospital encounter of 02/19/25   CT Chest Pulmonary Embolism w Contrast   Result Value Ref Range    Radiologist flags Incidental right upper lobe nodules     Impression    IMPRESSION:   1.  No acute findings in the chest. No acute pulmonary embolism.    2.  Incidental right upper lobe nodules measuring 0.3 cm. If no " history of malignancy, consider follow-up per Fleischner recommendations: For low-risk patients, no routine follow-up required. For high-risk patients, optional CT at 12 months.    3.  Mild urinary bladder wall thickening, possibly due to underdistention. However, correlate with urinalysis to assess for cystitis.    4.  Otherwise, no acute findings in the abdomen or pelvis.      [Consider Follow Up: Incidental right upper lobe nodules]    This report will be copied to the [facility] to ensure a provider acknowledges the finding.      CT Abdomen Pelvis w Contrast   Result Value Ref Range    Radiologist flags Incidental right upper lobe nodules     Impression    IMPRESSION:   1.  No acute findings in the chest. No acute pulmonary embolism.    2.  Incidental right upper lobe nodules measuring 0.3 cm. If no history of malignancy, consider follow-up per Fleischner recommendations: For low-risk patients, no routine follow-up required. For high-risk patients, optional CT at 12 months.    3.  Mild urinary bladder wall thickening, possibly due to underdistention. However, correlate with urinalysis to assess for cystitis.    4.  Otherwise, no acute findings in the abdomen or pelvis.      [Consider Follow Up: Incidental right upper lobe nodules]    This report will be copied to the [facility] to ensure a provider acknowledges the finding.      CBC with platelets   Result Value Ref Range    WBC Count 9.0 4.0 - 11.0 10e3/uL    RBC Count 4.61 3.80 - 5.20 10e6/uL    Hemoglobin 12.6 11.7 - 15.7 g/dL    Hematocrit 38.2 35.0 - 47.0 %    MCV 83 78 - 100 fL    MCH 27.3 26.5 - 33.0 pg    MCHC 33.0 31.5 - 36.5 g/dL    RDW 16.5 (H) 10.0 - 15.0 %    Platelet Count 297 150 - 450 10e3/uL   Basic metabolic panel   Result Value Ref Range    Sodium 139 135 - 145 mmol/L    Potassium 3.1 (L) 3.4 - 5.3 mmol/L    Chloride 99 98 - 107 mmol/L    Carbon Dioxide (CO2) 28 22 - 29 mmol/L    Anion Gap 12 7 - 15 mmol/L    Urea  Nitrogen 9.7 6.0 - 20.0 mg/dL    Creatinine 0.74 0.51 - 0.95 mg/dL    GFR Estimate >90 >60 mL/min/1.73m2    Calcium 9.0 8.8 - 10.4 mg/dL    Glucose 133 (H) 70 - 99 mg/dL   Result Value Ref Range    Troponin T, High Sensitivity 108 (HH) <=14 ng/L   Hepatic function panel   Result Value Ref Range    Protein Total 7.6 6.4 - 8.3 g/dL    Albumin 4.6 3.5 - 5.2 g/dL    Bilirubin Total 0.4 <=1.2 mg/dL    Alkaline Phosphatase 119 40 - 150 U/L    AST 37 0 - 45 U/L    ALT 31 0 - 50 U/L    Bilirubin Direct 0.20 0.00 - 0.30 mg/dL   Result Value Ref Range    Lipase 90 (H) 13 - 60 U/L   Influenza A/B, RSV and SARS-CoV2 PCR (COVID-19) Nasopharyngeal    Specimen: Nasopharyngeal; Swab   Result Value Ref Range    Influenza A PCR Negative Negative    Influenza B PCR Negative Negative    RSV PCR Negative Negative    SARS CoV2 PCR Negative Negative   D dimer quantitative   Result Value Ref Range    D-Dimer Quantitative 1.04 (H) 0.00 - 0.50 ug/mL FEU   Extra Red Top Tube   Result Value Ref Range    Hold Specimen JIC    CBC with platelets   Result Value Ref Range    WBC Count 11.6 (H) 4.0 - 11.0 10e3/uL    RBC Count 4.40 3.80 - 5.20 10e6/uL    Hemoglobin 12.2 11.7 - 15.7 g/dL    Hematocrit 36.5 35.0 - 47.0 %    MCV 83 78 - 100 fL    MCH 27.7 26.5 - 33.0 pg    MCHC 33.4 31.5 - 36.5 g/dL    RDW 16.4 (H) 10.0 - 15.0 %    Platelet Count 272 150 - 450 10e3/uL   ECG 12-LEAD WITH MUSE (LHE)   Result Value Ref Range    Systolic Blood Pressure  mmHg    Diastolic Blood Pressure  mmHg    Ventricular Rate 92 BPM    Atrial Rate 92 BPM    SC Interval 202 ms    QRS Duration 86 ms     ms    QTc 484 ms    P Axis 85 degrees    R AXIS 52 degrees    T Axis 98 degrees    Interpretation ECG       Sinus rhythm  Prolonged QT  Abnormal ECG  When compared with ECG of 02-Nov-2024 19:15,  Premature ventricular complexes are no longer Present  Nonspecific T wave abnormality, improved in Anterior leads  Nonspecific T wave abnormality now evident in Lateral  leads  Confirmed by SEE ED PROVIDER NOTE FOR, ECG INTERPRETATION ([id number]),  ALIA FORD ([id number]) on 2/19/2025 8:43:52 PM     ECG 12-LEAD WITH MUSE (LHE)   Result Value Ref Range    Systolic Blood Pressure  mmHg    Diastolic Blood Pressure  mmHg    Ventricular Rate 94 BPM    Atrial Rate 94 BPM    KY Interval 200 ms    QRS Duration 80 ms     ms    QTc 500 ms    P Axis 88 degrees    R AXIS 53 degrees    T Axis 99 degrees    Interpretation ECG       Sinus rhythm with occasional Premature ventricular complexes  T wave abnormality, consider lateral ischemia  Prolonged QT  Abnormal ECG  When compared with ECG of 19-Feb-2025 20:39,  Premature ventricular complexes are now Present  Confirmed by SEE ED PROVIDER NOTE FOR, ECG INTERPRETATION ([id number]),  ALIA FORD ([id number]) on 2/19/2025 10:01:54 PM             Pertinent Radiology

## 2025-02-21 PROCEDURE — 250N000013 HC RX MED GY IP 250 OP 250 PS 637: Performed by: NURSE PRACTITIONER

## 2025-02-21 PROCEDURE — 210N000001 HC R&B IMCU HEART CARE

## 2025-02-21 PROCEDURE — 250N000013 HC RX MED GY IP 250 OP 250 PS 637: Performed by: INTERNAL MEDICINE

## 2025-02-21 PROCEDURE — 99232 SBSQ HOSP IP/OBS MODERATE 35: CPT | Performed by: INTERNAL MEDICINE

## 2025-02-21 PROCEDURE — 99233 SBSQ HOSP IP/OBS HIGH 50: CPT | Performed by: INTERNAL MEDICINE

## 2025-02-21 PROCEDURE — 99233 SBSQ HOSP IP/OBS HIGH 50: CPT | Mod: FS | Performed by: STUDENT IN AN ORGANIZED HEALTH CARE EDUCATION/TRAINING PROGRAM

## 2025-02-21 PROCEDURE — 250N000013 HC RX MED GY IP 250 OP 250 PS 637: Performed by: STUDENT IN AN ORGANIZED HEALTH CARE EDUCATION/TRAINING PROGRAM

## 2025-02-21 RX ORDER — MEGESTROL ACETATE 20 MG/1
20 TABLET ORAL DAILY
Status: DISCONTINUED | OUTPATIENT
Start: 2025-02-21 | End: 2025-02-22 | Stop reason: HOSPADM

## 2025-02-21 RX ORDER — NALOXONE HYDROCHLORIDE 0.4 MG/ML
0.2 INJECTION, SOLUTION INTRAMUSCULAR; INTRAVENOUS; SUBCUTANEOUS
Status: DISCONTINUED | OUTPATIENT
Start: 2025-02-21 | End: 2025-02-22 | Stop reason: HOSPADM

## 2025-02-21 RX ORDER — NALOXONE HYDROCHLORIDE 0.4 MG/ML
0.4 INJECTION, SOLUTION INTRAMUSCULAR; INTRAVENOUS; SUBCUTANEOUS
Status: DISCONTINUED | OUTPATIENT
Start: 2025-02-21 | End: 2025-02-22 | Stop reason: HOSPADM

## 2025-02-21 RX ORDER — LOSARTAN POTASSIUM 25 MG/1
25 TABLET ORAL DAILY
Status: DISCONTINUED | OUTPATIENT
Start: 2025-02-21 | End: 2025-02-22 | Stop reason: HOSPADM

## 2025-02-21 RX ORDER — MULTIPLE VITAMINS W/ MINERALS TAB 9MG-400MCG
1 TAB ORAL DAILY
Status: DISCONTINUED | OUTPATIENT
Start: 2025-02-21 | End: 2025-02-22 | Stop reason: HOSPADM

## 2025-02-21 RX ADMIN — MEGESTROL ACETATE 20 MG: 20 TABLET ORAL at 13:22

## 2025-02-21 RX ADMIN — METOPROLOL SUCCINATE 25 MG: 25 TABLET, EXTENDED RELEASE ORAL at 09:44

## 2025-02-21 RX ADMIN — ROSUVASTATIN 20 MG: 10 TABLET, FILM COATED ORAL at 09:43

## 2025-02-21 RX ADMIN — FAMOTIDINE 20 MG: 20 TABLET, FILM COATED ORAL at 09:42

## 2025-02-21 RX ADMIN — FAMOTIDINE 20 MG: 20 TABLET, FILM COATED ORAL at 21:27

## 2025-02-21 RX ADMIN — ASPIRIN 162 MG: 81 TABLET, CHEWABLE ORAL at 09:43

## 2025-02-21 RX ADMIN — LOSARTAN POTASSIUM 25 MG: 25 TABLET, FILM COATED ORAL at 11:38

## 2025-02-21 RX ADMIN — Medication 1 TABLET: at 13:22

## 2025-02-21 ASSESSMENT — ACTIVITIES OF DAILY LIVING (ADL)
ADLS_ACUITY_SCORE: 33
ADLS_ACUITY_SCORE: 23
ADLS_ACUITY_SCORE: 23
ADLS_ACUITY_SCORE: 33
ADLS_ACUITY_SCORE: 30
ADLS_ACUITY_SCORE: 23
ADLS_ACUITY_SCORE: 30
ADLS_ACUITY_SCORE: 33
ADLS_ACUITY_SCORE: 23
ADLS_ACUITY_SCORE: 24
ADLS_ACUITY_SCORE: 23
ADLS_ACUITY_SCORE: 33
ADLS_ACUITY_SCORE: 24
ADLS_ACUITY_SCORE: 23
ADLS_ACUITY_SCORE: 30
ADLS_ACUITY_SCORE: 23
ADLS_ACUITY_SCORE: 30
ADLS_ACUITY_SCORE: 30
ADLS_ACUITY_SCORE: 33
ADLS_ACUITY_SCORE: 30
ADLS_ACUITY_SCORE: 29
ADLS_ACUITY_SCORE: 29
ADLS_ACUITY_SCORE: 23

## 2025-02-21 NOTE — PLAN OF CARE
Problem: Adult Inpatient Plan of Care  Goal: Plan of Care Review  Description: The Plan of Care Review/Shift note should be completed every shift.  The Outcome Evaluation is a brief statement about your assessment that the patient is improving, declining, or no change.  This information will be displayed automatically on your shift  note.  Outcome: Progressing   Goal Outcome Evaluation: Patient alert and oriented x4. No complaints of pain.  Up independent in room.  VSS, NSR on tele. Plan for discharge tomorrow if stable on new medications.  Patient compliant with POC.

## 2025-02-21 NOTE — PLAN OF CARE
Goal Outcome Evaluation:      Plan of Care Reviewed With: patient    Overall Patient Progress: improving    Outcome Evaluation: No complications post angio procedure.      Problem: Cardiac Catheterization (Diagnostic/Interventional)  Goal: Absence of Bleeding  Outcome: Progressing     Problem: Cardiac Catheterization (Diagnostic/Interventional)  Goal: Stable Heart Rate and Rhythm  Outcome: Progressing     Problem: Cardiac Catheterization (Diagnostic/Interventional)  Goal: Absence of Embolism Signs and Symptoms  Outcome: Progressing     Problem: Cardiac Catheterization (Diagnostic/Interventional)  Goal: Anesthesia/Sedation Recovery  Outcome: Progressing     Patient arrived from cath lab at approximately 1700. A&Ox4. No complications post procedure. CMS of RUE intact. No complaints of pain. TR band was removed at 1830. Vitals stable. NSR on tele.

## 2025-02-21 NOTE — PROGRESS NOTES
[facility] [address]  [place] [address]   [email or web address]   OFFICE: [phone]     CARDIOLOGY FOLLOW-UP NOTE      Impression and Plan     Impression:   NSTEMI: elevated troponin 108 --> 241. Coronary angiogram 2/20/25 showed nonobstructive disease with only 30% stenosis of proximal to mid LAD. Starting on rosuvastatin 20 mg daily. LDL 74  Stress cardiomyopathy: Akinesis of mid anterior and inferior wall seen on coronary angiogram. LVEF 30-35%.   Hypertension: Controlled      Plan:  Continue metoprolol 25 mg daily  Start losartan 25 mg daily  Continue rosuvastatin 20 mg daily  Upon discharge follow up with Rimma Pascual PA-C in 1 month  Follow up with Dr. Sinclair in 3-4 months  Likely repeat echocardiogram outpatient in 3 months  Recommend patient discuss recent weight loss with PCP for further workup    Okay from cardiac standpoint to discharge home tomorrow if continues to do well.    Primary Cardiologist: None. Can establish with Dr. Sinclair    Subjective     Patient denies any recurrence of chest pain or shortness of breath. Denies palpitations. Does note that she has lost 40 lbs since April without trying and that she eats a lot throughout the day. Notes decreased energy. Denies any new stressor though does note she had 3 deaths throughout the last 3 years though none recently.     Cardiac Diagnostics     ECG: Sinus rhythm   T wave abnormality, consider lateral ischemia   Prolonged QT   Abnormal ECG   When compared with ECG of 19-Feb-2025 21:59,   Premature ventricular complexes are no longer Present   Vent. rate has decreased by  33 bpm   Inverted T waves have replaced nonspecific T wave abnormality in Lateral leads   Confirmed by ANGELA SETH MD LOC:[facility] ([id number]) on 2/20/2025 4:16:57 PM     Echocardiogram 2/20/25  The left ventricle is normal in size. There is mild concentric left  ventricular hypertrophy.  Left ventricular function is decreased. The  "ejection fraction is 30-35%  (moderately reduced). There is severe hypokinesis of the mid to apical  segments with relatively preserved function of the basal segments. Consider a  diagnosis of Takotsubo Cardiomyopathy.     The right ventricle is normal in size and function.  Normal left atrial size. Right atrial size is normal.  IVC diameter <2.1 cm collapsing >50% with sniff suggests a normal RA pressure  of 3 mmHg.  There is no comparison study available.      Cardiac Cath (results reviewed): 2/20/25    Prox LAD to Mid LAD lesion is 30% stenosed.     1.  Large-caliber coronary arteries in a codominant system.    2.  30% proximal to mid LAD narrowing.  Remainder of the LAD appears normal.  3.  Large codominant left circumflex appears normal.  4.  Small caliber RCA supplies PDA only.  No stenoses.  5.  Akinesia of large segment of mid anterior wall and mid inferior wall with preserved apical function and hyperdynamic basilar function of anterior and inferior segments.  EF 30% by visual estimate.  Pattern consistent with stress cardiomyopathy.  6.  LVEDP = 22 mmHg    Physical Examination       /75   Pulse 68   Temp 99  F (37.2  C) (Oral)   Resp 16   Ht 1.676 m (5' 6\")   Wt 55.5 kg (122 lb 6.4 oz)   SpO2 100%   BMI 19.76 kg/m                Intake/Output Summary (Last 24 hours) at 2/21/2025 1115  Last data filed at 2/21/2025 0935  Gross per 24 hour   Intake 463 ml   Output 0 ml   Net 463 ml       General: pleasant female. No acute distress.   HENT: external ears normal. Nares patent. Mucous membranes moist.  Eyes: perrla, extraocular muscles intact. No scleral icterus.   Neck: No JVD  Lungs: clear to auscultation  COR:  regular rate and rhythm, No murmurs, rubs, or gallops  Extrem: No edema         Imaging      CT chest pulm 2/19/25:  1.  No acute findings in the chest. No acute pulmonary embolism.     2.  Incidental right upper lobe nodules measuring 0.3 cm. If no history of malignancy, consider " follow-up per Fleischner recommendations: For low-risk patients, no routine follow-up required. For high-risk patients, optional CT at 12 months.     3.  Mild urinary bladder wall thickening, possibly due to underdistention. However, correlate with urinalysis to assess for cystitis.     4.  Otherwise, no acute findings in the abdomen or pelvis.        [Consider Follow Up: Incidental right upper lobe nodules]    Lab Results   Lab Results   Component Value Date    CHOL 153 02/20/2025    HDL 72 02/20/2025    TRIG 36 02/20/2025    BUN 8.0 02/20/2025     02/20/2025    CO2 27 02/20/2025       Lab Results   Component Value Date    WBC 7.5 02/20/2025    HGB 11.4 (L) 02/20/2025    HCT 34.1 (L) 02/20/2025    MCV 83 02/20/2025     02/20/2025       Lab Results   Component Value Date    TSH 0.75 11/02/2024               Current Inpatient Scheduled Medications   Scheduled Meds:  Current Facility-Administered Medications   Medication Dose Route Frequency Provider Last Rate Last Admin    aspirin (ASA) chewable tablet 162 mg  162 mg Oral Daily TravisamitsioBoone shortle C, CNP   162 mg at 02/21/25 0943    famotidine (PEPCID) tablet 20 mg  20 mg Oral BID Hilario Jiang MD   20 mg at 02/21/25 0942    metoprolol succinate ER (TOPROL XL) 24 hr tablet 25 mg  25 mg Oral Daily Kalamitsiotis, Asia C, CNP   25 mg at 02/21/25 0944    rosuvastatin (CRESTOR) tablet 20 mg  20 mg Oral Daily Kalamitsiotis, Asia C, CNP   20 mg at 02/21/25 0943    sodium chloride (PF) 0.9% PF flush 3 mL  3 mL Intracatheter Q8H Kalamitsiotis, Asia C, CNP   3 mL at 02/21/25 0949     Continuous Infusions:  Current Facility-Administered Medications   Medication Dose Route Frequency Provider Last Rate Last Admin            Medications Prior to Admission   Prior to Admission medications    Medication Sig Start Date End Date Taking? Authorizing Provider   benazepril (LOTENSIN) 40 MG tablet Take 1 tablet (40 mg) by mouth daily for 14 days. 11/2/24  2/20/25  Daija Pepe PA-C   multivitamin w/minerals (MULTI-VITAMIN) tablet Take 1 tablet by mouth daily.    Unknown, Entered By History          Gisele Tanner PA-C

## 2025-02-21 NOTE — PROGRESS NOTES
"CLINICAL NUTRITION SERVICES - ASSESSMENT NOTE    RECOMMENDATIONS FOR MDs/PROVIDERS TO ORDER:    Malnutrition Status:    Moderate malnutrition in the context of chronic illness    Registered Dietitian Interventions:  Ensure enlive BID     Future/Additional Recommendations:  Monitor po, supplement power, weight, labs, I/Os.      REASON FOR ASSESSMENT  Positive admission nutrition risk screen    HPI: Pt with a PMH significant for HTN, anorexia who was initially admitted on [facility] on 02/19/2025 for NSTEMI. Patient was evaluated by Cardiology and recommended patient transfer to [facility] for coronary angiorma.     SUBJECTIVE INFORMATION  Assessed patient in room.    NUTRITION HISTORY  Pt reports okay appetite and good oral intakes- reports forcing herself to eat despite poor appetite at times. Pt reports ongoing unintentional weight loss, reports usual weight of 165 lbs. Pt states she has continually lost weight over the few years. Pt reports wanting to gain weight with goal weight of 165 lbs. Pt notes muscle and fat losses. Pt reports variety of weight gain strategies- increased calories, fat, protein, nutrition supplements. Pt frustrated with ongoing weight loss despite good po intakes. Pt agreeable to take ensure enlive BID, strawberry preferred.     CURRENT NUTRITION ORDERS  Diet: Low Saturated Fat/2400 mg Sodium    CURRENT INTAKE/TOLERANCE  100% po dinner 2/20 and 100% po breakfast this AM     LABS  Nutrition-relevant labs: Reviewed     MEDICATIONS  Nutrition-relevant medications: Scheduled pepcid, toprol xl, crestor    ANTHROPOMETRICS  Height: 167.6 cm (5' 6\")  Most Recent Weight: 55.5 kg (122 lb 6.4 oz)  IBW: 59 kg  % IBW: 94%  BMI (kg/m ): Normal BMI  Weight History: 9% wt loss x1 year, not clinically significant   Wt Readings from Last 10 Encounters:   02/21/25 55.5 kg (122 lb 6.4 oz)   02/19/25 59 kg (130 lb)   11/02/24 61.2 kg (135 lb)   12/29/23 61.2 kg (135 lb)   12/11/23 61.7 kg (136 lb)   12/01/23 61.7 " kg (136 lb)   10/30/23 62.1 kg (137 lb)   08/28/23 62.6 kg (138 lb)   08/14/23 64.4 kg (142 lb)   06/26/23 66.1 kg (145 lb 12 oz)   2/1/24   60.3 kg (133 lb)  1/27/24  62.8 kg (138 lb 6.4 oz)    Dosing Weight: 55.5 kg, based on actual wt    ASSESSED NUTRITION NEEDS  Estimated Energy Needs: 1922-6407 kcals/day (25 - 30 kcals/kg)  Justification: Maintenance  Estimated Protein Needs: 55-67 grams protein/day (1 - 1.2 grams of pro/kg)  Justification: Increased needs  Estimated Fluid Needs: 4157-6870 mL/day (30 - 35 mL/kg)  Justification: Maintenance    SYSTEM FINDINGS    BM: last noted 2/19    MALNUTRITION  % Intake: No decreased intake noted  % Weight Loss: Weight loss does not meet criteria   Subcutaneous Fat Loss: Orbital: Mild and Triceps: Moderate  Muscle Loss: Wasting of the temples (temporalis muscle): Mild, Clavicles (pectoralis and deltoids): Mild, Shoulders (deltoids): Moderate, and Scapula (latissimus dorsi, trapezious, deltoids): Moderate  Fluid Accumulation/Edema: None noted  Malnutrition Diagnosis: Moderate malnutrition in the context of chronic illness  Malnutrition Present on Admission: Yes    NUTRITION DIAGNOSIS  Unintended weight loss related to unclear etiology as evidenced by 9% wt loss x1 year, muscle and fat losses.     INTERVENTIONS  Medical food supplement therapy - Ensure enlive BID   Reviewed strategies for weight gain     Goals  Meet Nutrition needs   Maintain/Gain weight      Monitoring/Evaluation  Progress toward goals will be monitored and evaluated per policy.

## 2025-02-21 NOTE — CONSULTS
Please see formal consultation performed by Dr. Sinclair on 2/19/2025.  Patient transferred to [facility] for cardiac catheterization.

## 2025-02-21 NOTE — PROGRESS NOTES
[facility]    PROGRESS NOTE - Hospitalist Service    Assessment and Plan  54 years old female with a past medical history of hypertension, anorexia was progressive weight loss who presented to the hospital on 2/19/2025 with chest pain and found to have NSTEMI, cardiology consulted and recommend transfer patient to our facility for coronary angiogram.    Acute coronary syndrome with NSTEMI  -Patient presented with chest pain and shortness of breath  -EKG with new lateral TWI. Trop elevated 108->241. Pain relieved with nitroglycerin.   -Cardiology consult, appreciate input  - Started on heparin. Initially presented to [facility], but recommended to transfer to [facility] for further evaluation   - S/P coronary angiogram on 2/20/2025 which showed: Large-caliber coronary arteries in a codominant system.    30% proximal to mid LAD narrowing.  Remainder of the LAD appears normal. Large codominant left circumflex appears normal.Small caliber RCA supplies PDA only.  No stenoses. Akinesia of large segment of mid anterior wall and mid inferior wall with preserved apical function and hyperdynamic basilar function of anterior and inferior segments.  EF 30% by visual estimate.  Pattern consistent with stress cardiomyopathy.  -Postprocedure orders as per cardiology.  -Started metoprolol 25 mg daily  -Start statin  -Continue to monitor on telemetry.    Stress cardiomyopathy  -Echo shows akinesis of mid anterior and inferior wall seen on coronary angiogram. LVEF 30-35%.   -Start losartan  -Plan to repeat echo in 3 months.    Moderate malnutrition  -Progressive weight loss for more than 40 pounds, no family history of cancer  -Dietitian consult,  -Start supplement  -Patient has anorexia  -Start Megace    Right upper lobe lung nodule  -Incidental finding on CT chest   -Plan to repeat CT in 12 months for follow-up.  -Unremarkable CT abdomen and pelvis for acute changes    50 MINUTES SPENT BY ME on the  date of service doing chart review, history, exam, documentation & further activities per the note    Principal Problem:    Takotsubo syndrome  Active Problems:    Essential hypertension    Prediabetes -- Hgb A1C 5.8 on 8/28/23    Weight loss, abnormal -- 35 lbs in 10 months    Hypokalemia    Nausea and vomiting    Low LVEF at 30-35% on Echo 2/19/25    Epigastric pain      VTE prophylaxis:  Pneumatic Compression Devices  DIET: Orders Placed This Encounter      Low Saturated Fat Na <2400 mg      Disposition/Barriers to discharge: Post angiogram care, adjusting blood pressure medicine.  Medically Ready for Discharge: Anticipated Tomorrow   Code Status: Full Code    Subjective:  Bharath is feeling about the same today, denies any chest pain shortness of breath, has generalized weakness but no focal deficit.    PHYSICAL EXAM  Vitals:    02/20/25 1324 02/21/25 0638   Weight: 59 kg (130 lb) 55.5 kg (122 lb 6.4 oz)     B/P:140/75 T:99 P:57 R:16     Intake/Output Summary (Last 24 hours) at 2/21/2025 1242  Last data filed at 2/21/2025 0935  Gross per 24 hour   Intake 463 ml   Output 0 ml   Net 463 ml      Body mass index is 19.76 kg/m .    Constitutional: awake, alert, cooperative, no apparent distress, and appears stated age  Respiratory: No increased work of breathing, good air exchange, clear to auscultation bilaterally, no crackles or wheezing  Cardiovascular: Normal apical impulse, regular rate and rhythm, normal S1 and S2, no S3 or S4, and no murmur noted  GI: No scars, normal bowel sounds, soft, non-distended, non-tender, no masses palpated, no hepatosplenomegally  Skin: no bruising or bleeding and normal skin color, texture, turgor  Musculoskeletal: Diffuse muscle wasting, there is no redness, warmth, or swelling of the joints.  Full range of motion noted.  no lower extremity pitting edema present  Neurologic: Awake, alert, oriented to name, place and time.  Cranial nerves II-XII are grossly intact.  Motor is 5 out  of 5 bilaterally.   Sensory is intact.    Neuropsychiatric: Appropriate with examiner      PERTINENT LABS/IMAGING:        Imaging results reviewed over the past 24 hrs:   Recent Results (from the past 24 hours)   Cardiac Catheterization    Narrative      Prox LAD to Mid LAD lesion is 30% stenosed.    1.  Large-caliber coronary arteries in a codominant system.    2.  30% proximal to mid LAD narrowing.  Remainder of the LAD appears   normal.  3.  Large codominant left circumflex appears normal.  4.  Small caliber RCA supplies PDA only.  No stenoses.  5.  Akinesia of large segment of mid anterior wall and mid inferior wall   with preserved apical function and hyperdynamic basilar function of   anterior and inferior segments.  EF 30% by visual estimate.  Pattern   consistent with stress cardiomyopathy.  6.  LVEDP = 22 mmHg         Discussed with patient, family, cardiology, nursing staff and discharge planner    Tanner Oliver MD  [facility] Medicine Service  [phone]

## 2025-02-22 VITALS
TEMPERATURE: 98.3 F | DIASTOLIC BLOOD PRESSURE: 71 MMHG | WEIGHT: 121.69 LBS | OXYGEN SATURATION: 100 % | RESPIRATION RATE: 18 BRPM | HEIGHT: 66 IN | HEART RATE: 58 BPM | SYSTOLIC BLOOD PRESSURE: 131 MMHG | BODY MASS INDEX: 19.56 KG/M2

## 2025-02-22 LAB
ANION GAP SERPL CALCULATED.3IONS-SCNC: 5 MMOL/L (ref 7–15)
BUN SERPL-MCNC: 15.6 MG/DL (ref 6–20)
CALCIUM SERPL-MCNC: 9 MG/DL (ref 8.8–10.4)
CHLORIDE SERPL-SCNC: 106 MMOL/L (ref 98–107)
CREAT SERPL-MCNC: 1.03 MG/DL (ref 0.51–0.95)
EGFRCR SERPLBLD CKD-EPI 2021: 64 ML/MIN/1.73M2
ERYTHROCYTE [DISTWIDTH] IN BLOOD BY AUTOMATED COUNT: 15.9 % (ref 10–15)
GLUCOSE SERPL-MCNC: 92 MG/DL (ref 70–99)
HCO3 SERPL-SCNC: 31 MMOL/L (ref 22–29)
HCT VFR BLD AUTO: 37.6 % (ref 35–47)
HGB BLD-MCNC: 12.4 G/DL (ref 11.7–15.7)
MCH RBC QN AUTO: 27.3 PG (ref 26.5–33)
MCHC RBC AUTO-ENTMCNC: 33 G/DL (ref 31.5–36.5)
MCV RBC AUTO: 83 FL (ref 78–100)
PLATELET # BLD AUTO: 266 10E3/UL (ref 150–450)
POTASSIUM SERPL-SCNC: 3.5 MMOL/L (ref 3.4–5.3)
RBC # BLD AUTO: 4.55 10E6/UL (ref 3.8–5.2)
SODIUM SERPL-SCNC: 142 MMOL/L (ref 135–145)
WBC # BLD AUTO: 6.5 10E3/UL (ref 4–11)

## 2025-02-22 PROCEDURE — 80048 BASIC METABOLIC PNL TOTAL CA: CPT | Performed by: INTERNAL MEDICINE

## 2025-02-22 PROCEDURE — 250N000013 HC RX MED GY IP 250 OP 250 PS 637: Performed by: INTERNAL MEDICINE

## 2025-02-22 PROCEDURE — 36415 COLL VENOUS BLD VENIPUNCTURE: CPT | Performed by: INTERNAL MEDICINE

## 2025-02-22 PROCEDURE — 85014 HEMATOCRIT: CPT | Performed by: INTERNAL MEDICINE

## 2025-02-22 PROCEDURE — 250N000013 HC RX MED GY IP 250 OP 250 PS 637: Performed by: NURSE PRACTITIONER

## 2025-02-22 PROCEDURE — 99239 HOSP IP/OBS DSCHRG MGMT >30: CPT | Performed by: INTERNAL MEDICINE

## 2025-02-22 PROCEDURE — 250N000013 HC RX MED GY IP 250 OP 250 PS 637: Performed by: STUDENT IN AN ORGANIZED HEALTH CARE EDUCATION/TRAINING PROGRAM

## 2025-02-22 PROCEDURE — 80051 ELECTROLYTE PANEL: CPT | Performed by: INTERNAL MEDICINE

## 2025-02-22 PROCEDURE — 82565 ASSAY OF CREATININE: CPT | Performed by: INTERNAL MEDICINE

## 2025-02-22 RX ORDER — METOPROLOL SUCCINATE 25 MG/1
25 TABLET, EXTENDED RELEASE ORAL DAILY
Qty: 90 TABLET | Refills: 0 | Status: SHIPPED | OUTPATIENT
Start: 2025-02-23 | End: 2025-05-24

## 2025-02-22 RX ORDER — ASPIRIN 81 MG/1
162 TABLET, CHEWABLE ORAL DAILY
Qty: 180 TABLET | Refills: 0 | Status: SHIPPED | OUTPATIENT
Start: 2025-02-23 | End: 2025-05-24

## 2025-02-22 RX ORDER — MEGESTROL ACETATE 20 MG/1
20 TABLET ORAL DAILY
Qty: 30 TABLET | Refills: 0 | Status: SHIPPED | OUTPATIENT
Start: 2025-02-23 | End: 2025-03-25

## 2025-02-22 RX ORDER — ROSUVASTATIN CALCIUM 20 MG/1
20 TABLET, COATED ORAL DAILY
Qty: 90 TABLET | Refills: 0 | Status: SHIPPED | OUTPATIENT
Start: 2025-02-23 | End: 2025-05-24

## 2025-02-22 RX ORDER — LOSARTAN POTASSIUM 25 MG/1
25 TABLET ORAL DAILY
Qty: 90 TABLET | Refills: 0 | Status: SHIPPED | OUTPATIENT
Start: 2025-02-23 | End: 2025-05-24

## 2025-02-22 RX ORDER — NITROGLYCERIN 0.4 MG/1
TABLET SUBLINGUAL
Qty: 60 TABLET | Refills: 0 | Status: SHIPPED | OUTPATIENT
Start: 2025-02-22

## 2025-02-22 RX ADMIN — ASPIRIN 162 MG: 81 TABLET, CHEWABLE ORAL at 09:28

## 2025-02-22 RX ADMIN — Medication 1 TABLET: at 09:29

## 2025-02-22 RX ADMIN — METOPROLOL SUCCINATE 25 MG: 25 TABLET, EXTENDED RELEASE ORAL at 09:29

## 2025-02-22 RX ADMIN — FAMOTIDINE 20 MG: 20 TABLET, FILM COATED ORAL at 09:28

## 2025-02-22 RX ADMIN — LOSARTAN POTASSIUM 25 MG: 25 TABLET, FILM COATED ORAL at 09:28

## 2025-02-22 RX ADMIN — ROSUVASTATIN 20 MG: 10 TABLET, FILM COATED ORAL at 09:28

## 2025-02-22 RX ADMIN — MEGESTROL ACETATE 20 MG: 20 TABLET ORAL at 09:28

## 2025-02-22 ASSESSMENT — ACTIVITIES OF DAILY LIVING (ADL)
ADLS_ACUITY_SCORE: 33

## 2025-02-22 NOTE — PLAN OF CARE
Problem: Cardiac Catheterization (Diagnostic/Interventional)  Goal: Absence of Bleeding  Outcome: Progressing     Problem: Cardiac Catheterization (Diagnostic/Interventional)  Goal: Stable Heart Rate and Rhythm  Outcome: Progressing     Problem: Cardiac Catheterization (Diagnostic/Interventional)  Goal: Optimal Pain Control and Function  Outcome: Progressing   Goal Outcome Evaluation:    Pt's vital signs were stable. She was sinus sofia on telemetry. She denied pain throughout the night. She is up independently in her room. She is expecting to discharge home today. She is able to make needs known. Call light is within reach.

## 2025-02-22 NOTE — DISCHARGE SUMMARY
[facility]  Hospitalist Discharge Summary      Date of Admission:  2/20/2025  Date of Discharge:  2/22/2025  Discharging Provider: Tanner Oliver MD  Discharge Service: Hospitalist Service    Discharge Diagnoses   Acute coronary syndrome with NSTEMI  S/P coronary angiogram   Stress cardiomyopathy, EF of 30 to 35%  Moderate malnutrition  Right upper lung nodule  Hypertension    Clinically Significant Risk Factors     # Moderate Malnutrition: based on nutrition assessment      Follow-ups Needed After Discharge   Follow-up Appointments       Follow Up      Follow up with cardiology as scheduled        Hospital Follow-up with Existing Primary Care Provider (PCP)      Please see details below         Schedule Primary Care visit within: 14 Days               Unresulted Labs Ordered in the Past 30 Days of this Admission       No orders found from 1/21/2025 to 2/21/2025.        These results will be followed up by PCP    Discharge Disposition   Discharged to home  Condition at discharge: Stable    Hospital Course   54 years old female with a past medical history of hypertension, anorexia was progressive weight loss who presented to the hospital on 2/19/2025 with chest pain and found to have NSTEMI, cardiology consulted and recommend transfer patient to our facility for coronary angiogram.  Which showed stress cardiomyopathy.  Please refer to H&P for details.     Acute coronary syndrome with NSTEMI  -Patient presented with chest pain and shortness of breath  -EKG with new lateral TWI. Trop elevated 108->241. Pain relieved with nitroglycerin.   -Cardiology consult, appreciate input  - Started on heparin. Initially presented to [facility], but recommended to transfer to [facility] for further evaluation   - S/P coronary angiogram on 2/20/2025 which showed: Large-caliber coronary arteries in a codominant system.    30% proximal to mid LAD narrowing.  Remainder of the LAD appears normal. Large  codominant left circumflex appears normal.Small caliber RCA supplies PDA only.  No stenoses. Akinesia of large segment of mid anterior wall and mid inferior wall with preserved apical function and hyperdynamic basilar function of anterior and inferior segments.  EF 30% by visual estimate.  Pattern consistent with stress cardiomyopathy.  -Postprocedure orders as per cardiology.  -Started metoprolol 25 mg daily  -Start statin and aspirin, continue on discharge  -Unremarkable monitor on telemetry post angiogram.  -Follow-up with cardiology as outpatient     Stress cardiomyopathy  -Echo shows akinesis of mid anterior and inferior wall seen on coronary angiogram. LVEF 30-35%.   -Start losartan  -Plan to repeat echo in 3 months.     Moderate malnutrition  -Progressive weight loss for more than 40 pounds, no family history of cancer  -Dietitian consult,  -Start supplement  -Patient has anorexia  -Trial of Megace  -Patient will need further investigation for weight loss as outpatient.    Hypertension  -Stable blood pressure  -Switch lisinopril to losartan as per cardiology  -Add low-dose metoprolol as above     Right upper lobe lung nodule  -Incidental finding on CT chest   -Plan to repeat CT in 12 months for follow-up.  -Unremarkable CT abdomen and pelvis for acute changes  -Patient is aware and will follow-up with her PCP     Discussed with patient, cardiology, nursing staff and discharge planner.    Consultations This Hospital Stay   PHARMACY IP CONSULT  CARDIOLOGY IP CONSULT  HOSPITALIST IP CONSULT  CARDIOLOGY IP CONSULT  SMOKING CESSATION PROGRAM IP CONSULT    Code Status   Full Code    Time Spent on this Encounter   ITanner MD, personally saw the patient today and spent greater than 30 minutes discharging this patient.       Tanner Oliver MD  [facility]  [address] [place] [id number]  Phone: [phone]  Fax:  [phone]  ______________________________________________________________________    Physical Exam   Vital Signs: Temp: 98.3  F (36.8  C) Temp src: Oral BP: 131/71 Pulse: 58   Resp: 18 SpO2: 100 % O2 Device: None (Room air)    Weight: 121 lbs 11.1 oz  Constitutional: awake, alert, cooperative, no apparent distress, and appears stated age  Respiratory: No increased work of breathing, good air exchange, clear to auscultation bilaterally, no crackles or wheezing  Cardiovascular: Normal apical impulse, regular rate and rhythm, normal S1 and S2, no S3 or S4, and no murmur noted  GI: No scars, normal bowel sounds, soft, non-distended, non-tender, no masses palpated, no hepatosplenomegally  Skin: no bruising or bleeding and normal skin color, texture, turgor  Musculoskeletal: Diffuse muscle wasting, there is no redness, warmth, or swelling of the joints.  Full range of motion noted.  no lower extremity pitting edema present  Neurologic: Awake, alert, oriented to name, place and time.  Cranial nerves II-XII are grossly intact.  Motor is 5 out of 5 bilaterally.   Sensory is intact.    Neuropsychiatric: Appropriate with examiner       Primary Care Physician   Lauren Morgan    Discharge Orders      Reason for your hospital stay    Acute coronary syndrome with NSTEMI     Activity    Your activity upon discharge: activity as tolerated     Follow Up    Follow up with cardiology as scheduled     Diet    Follow this diet upon discharge: Current Diet:Orders Placed This Encounter      Snacks/Supplements Adult: Ensure Enlive; Between Meals      Low Saturated Fat Na <2400 mg     Hospital Follow-up with Existing Primary Care Provider (PCP)    Please see details below            Significant Results and Procedures   Most Recent 3 CBC's:  Recent Labs   Lab Test 02/22/25  0503 02/20/25  0616 02/19/25  2318   WBC 6.5 7.5 11.6*   HGB 12.4 11.4* 12.2   MCV 83 83 83    255 272     Most Recent 3 BMP's:  Recent Labs   Lab Test  02/22/25  0503 02/20/25  0616 02/20/25  0305 02/19/25 2046    140  --  139   POTASSIUM 3.5 4.0 3.8 3.1*   CHLORIDE 106 103  --  99   CO2 31* 27  --  28   BUN 15.6 8.0  --  9.7   CR 1.03* 0.65  --  0.74   ANIONGAP 5* 10  --  12   GENESIS 9.0 9.0  --  9.0   GLC 92 96  --  133*     Most Recent 2 LFT's:  Recent Labs   Lab Test 02/20/25  0616 02/19/25 2046   AST 34 37   ALT 25 31   ALKPHOS 88 119   BILITOTAL 0.5 0.4   ,   Results for orders placed or performed during the hospital encounter of 02/20/25   Cardiac Catheterization    Narrative      Prox LAD to Mid LAD lesion is 30% stenosed.    1.  Large-caliber coronary arteries in a codominant system.    2.  30% proximal to mid LAD narrowing.  Remainder of the LAD appears   normal.  3.  Large codominant left circumflex appears normal.  4.  Small caliber RCA supplies PDA only.  No stenoses.  5.  Akinesia of large segment of mid anterior wall and mid inferior wall   with preserved apical function and hyperdynamic basilar function of   anterior and inferior segments.  EF 30% by visual estimate.  Pattern   consistent with stress cardiomyopathy.  6.  LVEDP = 22 mmHg         Discharge Medications   Current Discharge Medication List        START taking these medications    Details   aspirin (ASA) 81 MG chewable tablet Take 2 tablets (162 mg) by mouth daily.  Qty: 180 tablet, Refills: 0    Associated Diagnoses: NSTEMI (non-ST elevated myocardial infarction) (H)      losartan (COZAAR) 25 MG tablet Take 1 tablet (25 mg) by mouth daily.  Qty: 90 tablet, Refills: 0    Associated Diagnoses: NSTEMI (non-ST elevated myocardial infarction) (H)      megestrol (MEGACE) 20 MG tablet Take 1 tablet (20 mg) by mouth daily.  Qty: 30 tablet, Refills: 0    Associated Diagnoses: Anorexia      metoprolol succinate ER (TOPROL XL) 25 MG 24 hr tablet Take 1 tablet (25 mg) by mouth daily.  Qty: 90 tablet, Refills: 0    Associated Diagnoses: NSTEMI (non-ST elevated myocardial infarction) (H)       nitroGLYcerin (NITROSTAT) 0.4 MG sublingual tablet For chest pain place 1 tablet under the tongue every 5 minutes for 3 doses. If symptoms persist 5 minutes after 1st dose call 911.  Qty: 60 tablet, Refills: 0    Associated Diagnoses: NSTEMI (non-ST elevated myocardial infarction) (H)      rosuvastatin (CRESTOR) 20 MG tablet Take 1 tablet (20 mg) by mouth daily.  Qty: 90 tablet, Refills: 0    Associated Diagnoses: NSTEMI (non-ST elevated myocardial infarction) (H)           CONTINUE these medications which have NOT CHANGED    Details   multivitamin w/minerals (MULTI-VITAMIN) tablet Take 1 tablet by mouth daily.           STOP taking these medications       benazepril (LOTENSIN) 40 MG tablet Comments:   Reason for Stopping:             Allergies   No Known Allergies

## 2025-02-22 NOTE — PLAN OF CARE
Problem: Adult Inpatient Plan of Care  Goal: Readiness for Transition of Care  2/21/2025 1843 by Toshia Evans RN  Outcome: Progressing  2/21/2025 1843 by Toshia Evans RN  Outcome: Progressing     Problem: Adult Inpatient Plan of Care  Goal: Optimal Comfort and Wellbeing  2/21/2025 1843 by Toshia Evans RN  Outcome: Progressing  2/21/2025 1843 by Toshia Evans RN  Outcome: Progressing   Goal Outcome Evaluation:      Plan of Care Reviewed With: patient    Overall Patient Progress: improvingOverall Patient Progress: improving     Pt is A&Ox4, on RA, NSR, and independent in the room. Denies pain.     Plan to discharge home tomorrow 2/22.   Toshia Evans RN

## (undated) DEVICE — SYR ANGIOGRAPHY MULTIUSE KIT ACIST 014612

## (undated) DEVICE — MANIFOLD KIT ANGIO AUTOMATED 014613

## (undated) DEVICE — EXCHANGE WIRE .035 260 STAR/JFC/035/260/ M001491681

## (undated) DEVICE — CATH DIAGNOSTIC RADIAL 5FR TIG 4.0

## (undated) DEVICE — CUSTOM PACK CORONARY SAN5BCRHEA

## (undated) DEVICE — SLEEVE TR BAND RADIAL COMPRESSION DEVICE 24CM TRB24-REG

## (undated) DEVICE — SHTH INTRO 0.021IN ID 6FR DIA

## (undated) DEVICE — ELECTRODE DEFIB CADENCE 22550R

## (undated) DEVICE — CATH ANGIO INFINITI PIGTAIL 155 6 SH 6FRX110CM 534654S

## (undated) DEVICE — KIT HAND CONTROL ACIST 014644 AR-P54

## (undated) RX ORDER — DIAZEPAM 10 MG/1
TABLET ORAL
Status: DISPENSED
Start: 2025-02-20

## (undated) RX ORDER — FENTANYL CITRATE 50 UG/ML
INJECTION, SOLUTION INTRAMUSCULAR; INTRAVENOUS
Status: DISPENSED
Start: 2025-02-20

## (undated) RX ORDER — ASPIRIN 81 MG/1
TABLET, CHEWABLE ORAL
Status: DISPENSED
Start: 2025-02-20